# Patient Record
Sex: MALE | Race: WHITE | Employment: OTHER | URBAN - METROPOLITAN AREA
[De-identification: names, ages, dates, MRNs, and addresses within clinical notes are randomized per-mention and may not be internally consistent; named-entity substitution may affect disease eponyms.]

---

## 2021-01-03 ENCOUNTER — APPOINTMENT (OUTPATIENT)
Dept: ULTRASOUND IMAGING | Facility: HOSPITAL | Age: 61
DRG: 175 | End: 2021-01-03
Attending: EMERGENCY MEDICINE
Payer: MEDICARE

## 2021-01-03 ENCOUNTER — HOSPITAL ENCOUNTER (INPATIENT)
Facility: HOSPITAL | Age: 61
LOS: 9 days | Discharge: HOME HEALTH | DRG: 175 | End: 2021-01-13
Attending: EMERGENCY MEDICINE | Admitting: INTERNAL MEDICINE
Payer: MEDICARE

## 2021-01-03 ENCOUNTER — APPOINTMENT (OUTPATIENT)
Dept: GENERAL RADIOLOGY | Facility: HOSPITAL | Age: 61
DRG: 175 | End: 2021-01-03
Attending: EMERGENCY MEDICINE
Payer: MEDICARE

## 2021-01-03 ENCOUNTER — APPOINTMENT (OUTPATIENT)
Dept: CT IMAGING | Facility: HOSPITAL | Age: 61
DRG: 175 | End: 2021-01-03
Attending: EMERGENCY MEDICINE
Payer: MEDICARE

## 2021-01-03 DIAGNOSIS — E11.65 TYPE 2 DIABETES MELLITUS WITH HYPERGLYCEMIA, WITHOUT LONG-TERM CURRENT USE OF INSULIN (HCC): ICD-10-CM

## 2021-01-03 DIAGNOSIS — I26.99 ACUTE PULMONARY EMBOLISM, UNSPECIFIED PULMONARY EMBOLISM TYPE, UNSPECIFIED WHETHER ACUTE COR PULMONALE PRESENT (HCC): Primary | ICD-10-CM

## 2021-01-03 DIAGNOSIS — R00.0 TACHYCARDIA: ICD-10-CM

## 2021-01-03 DIAGNOSIS — F10.21 HISTORY OF ALCOHOLISM (HCC): ICD-10-CM

## 2021-01-03 DIAGNOSIS — N28.9 RENAL INSUFFICIENCY: ICD-10-CM

## 2021-01-03 DIAGNOSIS — I50.813 ACUTE ON CHRONIC RIGHT-SIDED CONGESTIVE HEART FAILURE (HCC): ICD-10-CM

## 2021-01-03 DIAGNOSIS — K59.00 CONSTIPATION, UNSPECIFIED CONSTIPATION TYPE: ICD-10-CM

## 2021-01-03 DIAGNOSIS — R77.8 ELEVATED TROPONIN: ICD-10-CM

## 2021-01-03 DIAGNOSIS — R79.1 ELEVATED INR: ICD-10-CM

## 2021-01-03 LAB
ALBUMIN SERPL-MCNC: 2.7 G/DL (ref 3.4–5)
ALBUMIN/GLOB SERPL: 0.7 {RATIO} (ref 1–2)
ALP LIVER SERPL-CCNC: 93 U/L
ALT SERPL-CCNC: 58 U/L
ANION GAP SERPL CALC-SCNC: 5 MMOL/L (ref 0–18)
APTT PPP: 34.1 SECONDS (ref 25.4–36.1)
AST SERPL-CCNC: 54 U/L (ref 15–37)
BASOPHILS # BLD AUTO: 0.09 X10(3) UL (ref 0–0.2)
BASOPHILS NFR BLD AUTO: 1 %
BILIRUB SERPL-MCNC: 2 MG/DL (ref 0.1–2)
BUN BLD-MCNC: 23 MG/DL (ref 7–18)
BUN/CREAT SERPL: 15.9 (ref 10–20)
CALCIUM BLD-MCNC: 9.2 MG/DL (ref 8.5–10.1)
CHLORIDE SERPL-SCNC: 110 MMOL/L (ref 98–112)
CO2 SERPL-SCNC: 25 MMOL/L (ref 21–32)
CREAT BLD-MCNC: 1.45 MG/DL
D-DIMER: 12.44 UG/ML FEU (ref ?–0.6)
DEPRECATED RDW RBC AUTO: 52.6 FL (ref 35.1–46.3)
EOSINOPHIL # BLD AUTO: 0.11 X10(3) UL (ref 0–0.7)
EOSINOPHIL NFR BLD AUTO: 1.2 %
ERYTHROCYTE [DISTWIDTH] IN BLOOD BY AUTOMATED COUNT: 16.2 % (ref 11–15)
EST. AVERAGE GLUCOSE BLD GHB EST-MCNC: 197 MG/DL (ref 68–126)
ETHANOL SERPL-MCNC: <3 MG/DL (ref ?–3)
GLOBULIN PLAS-MCNC: 3.9 G/DL (ref 2.8–4.4)
GLUCOSE BLD-MCNC: 186 MG/DL (ref 70–99)
HBA1C MFR BLD HPLC: 8.5 % (ref ?–5.7)
HCT VFR BLD AUTO: 39.2 %
HGB BLD-MCNC: 12.3 G/DL
IMM GRANULOCYTES # BLD AUTO: 0.05 X10(3) UL (ref 0–1)
IMM GRANULOCYTES NFR BLD: 0.6 %
INR BLD: 1.4 (ref 0.89–1.11)
LYMPHOCYTES # BLD AUTO: 1.43 X10(3) UL (ref 1–4)
LYMPHOCYTES NFR BLD AUTO: 16.1 %
M PROTEIN MFR SERPL ELPH: 6.6 G/DL (ref 6.4–8.2)
MCH RBC QN AUTO: 28.6 PG (ref 26–34)
MCHC RBC AUTO-ENTMCNC: 31.4 G/DL (ref 31–37)
MCV RBC AUTO: 91.2 FL
MONOCYTES # BLD AUTO: 1.17 X10(3) UL (ref 0.1–1)
MONOCYTES NFR BLD AUTO: 13.2 %
NEUTROPHILS # BLD AUTO: 6.03 X10 (3) UL (ref 1.5–7.7)
NEUTROPHILS # BLD AUTO: 6.03 X10(3) UL (ref 1.5–7.7)
NEUTROPHILS NFR BLD AUTO: 67.9 %
NT-PROBNP SERPL-MCNC: ABNORMAL PG/ML (ref ?–125)
OSMOLALITY SERPL CALC.SUM OF ELEC: 299 MOSM/KG (ref 275–295)
PLATELET # BLD AUTO: 259 10(3)UL (ref 150–450)
POTASSIUM SERPL-SCNC: 4.7 MMOL/L (ref 3.5–5.1)
PSA SERPL DL<=0.01 NG/ML-MCNC: 17.6 SECONDS (ref 12.4–14.6)
RBC # BLD AUTO: 4.3 X10(6)UL
SARS-COV-2 RNA RESP QL NAA+PROBE: NOT DETECTED
SODIUM SERPL-SCNC: 140 MMOL/L (ref 136–145)
TROPONIN I SERPL-MCNC: 0.18 NG/ML (ref ?–0.04)
WBC # BLD AUTO: 8.9 X10(3) UL (ref 4–11)

## 2021-01-03 PROCEDURE — 93970 EXTREMITY STUDY: CPT | Performed by: EMERGENCY MEDICINE

## 2021-01-03 PROCEDURE — 74019 RADEX ABDOMEN 2 VIEWS: CPT | Performed by: EMERGENCY MEDICINE

## 2021-01-03 PROCEDURE — 71275 CT ANGIOGRAPHY CHEST: CPT | Performed by: EMERGENCY MEDICINE

## 2021-01-03 PROCEDURE — 71045 X-RAY EXAM CHEST 1 VIEW: CPT | Performed by: EMERGENCY MEDICINE

## 2021-01-03 RX ORDER — ACETAMINOPHEN 325 MG/1
650 TABLET ORAL EVERY 6 HOURS PRN
Status: DISCONTINUED | OUTPATIENT
Start: 2021-01-03 | End: 2021-01-13

## 2021-01-03 RX ORDER — HEPARIN SODIUM AND DEXTROSE 10000; 5 [USP'U]/100ML; G/100ML
18 INJECTION INTRAVENOUS ONCE
Status: COMPLETED | OUTPATIENT
Start: 2021-01-03 | End: 2021-01-04

## 2021-01-03 RX ORDER — FUROSEMIDE 10 MG/ML
60 INJECTION INTRAMUSCULAR; INTRAVENOUS ONCE
Status: COMPLETED | OUTPATIENT
Start: 2021-01-03 | End: 2021-01-03

## 2021-01-03 RX ORDER — ONDANSETRON 2 MG/ML
4 INJECTION INTRAMUSCULAR; INTRAVENOUS EVERY 6 HOURS PRN
Status: DISCONTINUED | OUTPATIENT
Start: 2021-01-03 | End: 2021-01-13

## 2021-01-03 RX ORDER — MELATONIN
3 NIGHTLY PRN
Status: DISCONTINUED | OUTPATIENT
Start: 2021-01-03 | End: 2021-01-13

## 2021-01-03 RX ORDER — FAMOTIDINE 10 MG/ML
20 INJECTION, SOLUTION INTRAVENOUS ONCE
Status: COMPLETED | OUTPATIENT
Start: 2021-01-03 | End: 2021-01-03

## 2021-01-03 RX ORDER — ASPIRIN 81 MG/1
162 TABLET, CHEWABLE ORAL DAILY
Status: DISCONTINUED | OUTPATIENT
Start: 2021-01-03 | End: 2021-01-04

## 2021-01-03 RX ORDER — HEPARIN SODIUM 5000 [USP'U]/ML
80 INJECTION INTRAVENOUS; SUBCUTANEOUS ONCE
Status: COMPLETED | OUTPATIENT
Start: 2021-01-03 | End: 2021-01-03

## 2021-01-04 ENCOUNTER — APPOINTMENT (OUTPATIENT)
Dept: CV DIAGNOSTICS | Facility: HOSPITAL | Age: 61
DRG: 175 | End: 2021-01-04
Attending: HOSPITALIST
Payer: MEDICARE

## 2021-01-04 PROBLEM — I26.99 ACUTE PULMONARY EMBOLISM, UNSPECIFIED PULMONARY EMBOLISM TYPE, UNSPECIFIED WHETHER ACUTE COR PULMONALE PRESENT (HCC): Status: ACTIVE | Noted: 2021-01-04

## 2021-01-04 PROBLEM — R77.8 ELEVATED TROPONIN: Status: ACTIVE | Noted: 2021-01-04

## 2021-01-04 PROBLEM — R79.1 ELEVATED INR: Status: ACTIVE | Noted: 2021-01-04

## 2021-01-04 PROBLEM — K59.00 CONSTIPATION, UNSPECIFIED CONSTIPATION TYPE: Status: ACTIVE | Noted: 2021-01-04

## 2021-01-04 PROBLEM — F10.21 HISTORY OF ALCOHOLISM (HCC): Status: ACTIVE | Noted: 2021-01-04

## 2021-01-04 PROBLEM — N28.9 RENAL INSUFFICIENCY: Status: ACTIVE | Noted: 2021-01-04

## 2021-01-04 PROBLEM — E11.65 TYPE 2 DIABETES MELLITUS WITH HYPERGLYCEMIA, WITHOUT LONG-TERM CURRENT USE OF INSULIN (HCC): Status: ACTIVE | Noted: 2021-01-04

## 2021-01-04 PROBLEM — R00.0 TACHYCARDIA: Status: ACTIVE | Noted: 2021-01-04

## 2021-01-04 LAB
ANION GAP SERPL CALC-SCNC: 7 MMOL/L (ref 0–18)
APTT PPP: 108.5 SECONDS (ref 25.4–36.1)
APTT PPP: 167 SECONDS (ref 25.4–36.1)
APTT PPP: >300 SECONDS (ref 25.4–36.1)
ATRIAL RATE: 116 BPM
ATRIAL RATE: 127 BPM
BASOPHILS # BLD AUTO: 0.09 X10(3) UL (ref 0–0.2)
BASOPHILS NFR BLD AUTO: 1.3 %
BILIRUB UR QL STRIP.AUTO: NEGATIVE
BUN BLD-MCNC: 22 MG/DL (ref 7–18)
BUN/CREAT SERPL: 16.5 (ref 10–20)
CALCIUM BLD-MCNC: 8.9 MG/DL (ref 8.5–10.1)
CHLORIDE SERPL-SCNC: 111 MMOL/L (ref 98–112)
CHOLEST SMN-MCNC: 100 MG/DL (ref ?–200)
CLARITY UR REFRACT.AUTO: CLEAR
CO2 SERPL-SCNC: 23 MMOL/L (ref 21–32)
COLOR UR AUTO: YELLOW
CREAT BLD-MCNC: 1.33 MG/DL
DEPRECATED RDW RBC AUTO: 53.1 FL (ref 35.1–46.3)
EOSINOPHIL # BLD AUTO: 0.06 X10(3) UL (ref 0–0.7)
EOSINOPHIL NFR BLD AUTO: 0.8 %
ERYTHROCYTE [DISTWIDTH] IN BLOOD BY AUTOMATED COUNT: 16.3 % (ref 11–15)
GLUCOSE BLD-MCNC: 131 MG/DL (ref 70–99)
GLUCOSE BLD-MCNC: 140 MG/DL (ref 70–99)
GLUCOSE BLD-MCNC: 141 MG/DL (ref 70–99)
GLUCOSE BLD-MCNC: 153 MG/DL (ref 70–99)
GLUCOSE BLD-MCNC: 165 MG/DL (ref 70–99)
GLUCOSE BLD-MCNC: 177 MG/DL (ref 70–99)
GLUCOSE BLD-MCNC: 195 MG/DL (ref 70–99)
GLUCOSE UR STRIP.AUTO-MCNC: NEGATIVE MG/DL
HAV IGM SER QL: 1.7 MG/DL (ref 1.6–2.6)
HCT VFR BLD AUTO: 38.9 %
HDLC SERPL-MCNC: 39 MG/DL (ref 40–59)
HGB BLD-MCNC: 12.1 G/DL
IMM GRANULOCYTES # BLD AUTO: 0.03 X10(3) UL (ref 0–1)
IMM GRANULOCYTES NFR BLD: 0.4 %
KETONES UR STRIP.AUTO-MCNC: NEGATIVE MG/DL
LDLC SERPL CALC-MCNC: 47 MG/DL (ref ?–100)
LEUKOCYTE ESTERASE UR QL STRIP.AUTO: NEGATIVE
LYMPHOCYTES # BLD AUTO: 1.45 X10(3) UL (ref 1–4)
LYMPHOCYTES NFR BLD AUTO: 20.3 %
MCH RBC QN AUTO: 28.3 PG (ref 26–34)
MCHC RBC AUTO-ENTMCNC: 31.1 G/DL (ref 31–37)
MCV RBC AUTO: 90.9 FL
MONOCYTES # BLD AUTO: 0.78 X10(3) UL (ref 0.1–1)
MONOCYTES NFR BLD AUTO: 10.9 %
NEUTROPHILS # BLD AUTO: 4.74 X10 (3) UL (ref 1.5–7.7)
NEUTROPHILS # BLD AUTO: 4.74 X10(3) UL (ref 1.5–7.7)
NEUTROPHILS NFR BLD AUTO: 66.3 %
NITRITE UR QL STRIP.AUTO: NEGATIVE
NONHDLC SERPL-MCNC: 61 MG/DL (ref ?–130)
OSMOLALITY SERPL CALC.SUM OF ELEC: 298 MOSM/KG (ref 275–295)
P AXIS: 44 DEGREES
P AXIS: 60 DEGREES
P-R INTERVAL: 126 MS
P-R INTERVAL: 144 MS
PH UR STRIP.AUTO: 5 [PH] (ref 4.5–8)
PLATELET # BLD AUTO: 241 10(3)UL (ref 150–450)
POTASSIUM SERPL-SCNC: 4.3 MMOL/L (ref 3.5–5.1)
PROT UR STRIP.AUTO-MCNC: NEGATIVE MG/DL
Q-T INTERVAL: 312 MS
Q-T INTERVAL: 352 MS
QRS DURATION: 98 MS
QRS DURATION: 98 MS
QTC CALCULATION (BEZET): 453 MS
QTC CALCULATION (BEZET): 489 MS
R AXIS: 102 DEGREES
R AXIS: 113 DEGREES
RBC # BLD AUTO: 4.28 X10(6)UL
RBC UR QL AUTO: NEGATIVE
SODIUM SERPL-SCNC: 141 MMOL/L (ref 136–145)
SP GR UR STRIP.AUTO: 1.01 (ref 1–1.03)
T AXIS: 26 DEGREES
T AXIS: 6 DEGREES
TRIGL SERPL-MCNC: 68 MG/DL (ref 30–149)
UROBILINOGEN UR STRIP.AUTO-MCNC: <2 MG/DL
VENTRICULAR RATE: 116 BPM
VENTRICULAR RATE: 127 BPM
VLDLC SERPL CALC-MCNC: 14 MG/DL (ref 0–30)
WBC # BLD AUTO: 7.2 X10(3) UL (ref 4–11)

## 2021-01-04 PROCEDURE — 93306 TTE W/DOPPLER COMPLETE: CPT | Performed by: HOSPITALIST

## 2021-01-04 RX ORDER — DEXTROSE MONOHYDRATE 25 G/50ML
50 INJECTION, SOLUTION INTRAVENOUS
Status: DISCONTINUED | OUTPATIENT
Start: 2021-01-04 | End: 2021-01-13

## 2021-01-04 RX ORDER — LOSARTAN POTASSIUM 25 MG/1
12.5 TABLET ORAL DAILY
Status: DISCONTINUED | OUTPATIENT
Start: 2021-01-04 | End: 2021-01-05

## 2021-01-04 RX ORDER — HEPARIN SODIUM AND DEXTROSE 10000; 5 [USP'U]/100ML; G/100ML
INJECTION INTRAVENOUS CONTINUOUS
Status: DISCONTINUED | OUTPATIENT
Start: 2021-01-04 | End: 2021-01-11

## 2021-01-04 RX ORDER — FUROSEMIDE 10 MG/ML
40 INJECTION INTRAMUSCULAR; INTRAVENOUS ONCE
Status: COMPLETED | OUTPATIENT
Start: 2021-01-04 | End: 2021-01-04

## 2021-01-04 RX ORDER — FUROSEMIDE 10 MG/ML
40 INJECTION INTRAMUSCULAR; INTRAVENOUS
Status: DISCONTINUED | OUTPATIENT
Start: 2021-01-04 | End: 2021-01-06

## 2021-01-04 RX ORDER — METOPROLOL SUCCINATE 50 MG/1
50 TABLET, EXTENDED RELEASE ORAL
Status: DISCONTINUED | OUTPATIENT
Start: 2021-01-04 | End: 2021-01-04

## 2021-01-04 RX ORDER — ASPIRIN 81 MG/1
81 TABLET, CHEWABLE ORAL DAILY
Status: DISCONTINUED | OUTPATIENT
Start: 2021-01-05 | End: 2021-01-05

## 2021-01-04 RX ORDER — MAGNESIUM OXIDE 400 MG (241.3 MG MAGNESIUM) TABLET
400 TABLET ONCE
Status: COMPLETED | OUTPATIENT
Start: 2021-01-04 | End: 2021-01-04

## 2021-01-04 NOTE — CONSULTS
[facility]  Report of Consultation    Steve Dallins Patient Status:  Inpatient    1960 MRN [id number]   [facility] 8NE-A Attending Gaudencio Griggs MD   Hosp Day # 0 PCP Abbey Norris MD     REASON FOR CONSULTATION:     Recurr Hyperlipidemia     history of   • Obesity    • Pneumonia      History reviewed. No pertinent surgical history.   Family History   Problem Relation Age of Onset   • Other (Other) Mother         accident   • Hypertension Father    • Heart Disorder Father distress. Vital Signs: BP (!) 134/94 (BP Location: Left arm)   Pulse 116   Temp 98 °F (36.7 °C) (Oral)   Resp 20   Wt 134 kg (295 lb 6.7 oz)   SpO2 93%   BMI 44.92 kg/m²    HEENT: No Icterus. Neck:  No palpable lymphadenopathy. Neck is supple.    Chest Range    Troponin 0.180 (HH) <0.045 ng/mL   D-DIMER    Collection Time: 01/03/21  7:26 PM   Result Value Ref Range    D-Dimer 12.44 (H) <0.60 ug/mL FEU   PROTHROMBIN TIME (PT)    Collection Time: 01/03/21  7:26 PM   Result Value Ref Range    PT 17.6 (H) 12 (Bezet) 453 ms    P Axis 44 degrees    R Axis 113 degrees    T Axis 6 degrees   RAPID SARS-COV-2 BY PCR    Collection Time: 01/03/21  8:14 PM    Specimen: Nares;  Other   Result Value Ref Range    Rapid SARS-CoV-2 by PCR Not Detected Not Detected   POCT GLU 10(3)uL    MCV 90.9 80.0 - 100.0 fL    MCH 28.3 26.0 - 34.0 pg    MCHC 31.1 31.0 - 37.0 g/dL    RDW 16.3 (H) 11.0 - 15.0 %    RDW-SD 53.1 (H) 35.1 - 46.3 fL    Neutrophil Absolute Prelim 4.74 1.50 - 7.70 x10 (3) uL    Neutrophil Absolute 4.74 1.50 - 7.70 x Angiography, Chest (cpt=71275)    Result Date: 1/3/2021  CONCLUSION:   1. Small to moderate burden of bilateral pulmonary emboli. 2. Small right pleural effusion with trace left pleural effusion.   Critical findings discussed with Dr. Jacqueline Lopes at 2213 hours o is a 61year old male with recurrent venous thromboembolism     This is his second episode and this time both deep vein thrombosis and pulmonary embolism. Not sure the risk factor but the clotting is quite extensive darrell in the left leg.  He is on anticoagul

## 2021-01-04 NOTE — CONSULTS
Pulmonary / Critical Care H&P/Consult       NAME: Tayler Melara 125: [id number] - MRN: [id number] - Age: 61year old - :  1960    Date of Admission: 1/3/2021  7:03 PM  Admission Diagnosis: Tachycardia [R00.0]  Elevated INR [R79.1]  Renal in Smokeless tobacco: Never Used    Substance and Sexual Activity      Alcohol use: Yes        Comment: social - Hx of heavy use      Drug use: No      Sexual activity: Not on file    Lifestyle      Physical activity        Days per week: Not on file        M Temp:  98.4 °F (36.9 °C)  97.9 °F (36.6 °C)   TempSrc:  Oral  Oral   SpO2: 97% 98% 98% 99%   Weight:         O2: ra              Wt Readings from Last 3 Encounters:  01/04/21 : 295 lb 6.7 oz (134 kg)  12/09/20 : 279 lb (126.6 kg)  08/12/16 : 225 lb (102. bilat PEs in distal R and L main PAs extending to lower lobes, non occlusive. Small R > L effusions. Lungs clear    ASSESSMENT/PLAN:    1. Dyspnea: secondary to bilat PEs  - on room air  2.  PE / DVT: extensive LLE dvt with bilat PEs; tachycardic but stable

## 2021-01-04 NOTE — H&P
[facility]    History and Physical     [facility] Patient Status:  Inpatient    1960 MRN [id number]   [facility] 8NE-A Attending Janna Colbert MD   Hosp Day # 0 PCP Celestina Ng MD     Chief Complaint: sob and abdomin day for 40 years),  3 times but living with 3rd ex-wife, 6 kids, no cane or walker    Family History:   Family History   Problem Relation Age of Onset   • Other (Other) Mother         accident   • Hypertension Father    • Heart Disorder Father BUN 23* 22*   CREATSERUM 1.45* 1.33*   GFRAA 60 67   GFRNAA 52* 58*   CA 9.2 8.9   ALB 2.7*  --     141   K 4.7 4.3    111   CO2 25.0 23.0   ALKPHO 93  --    AST 54*  --    ALT 58  --    BILT 2.0  --    TP 6.6  --        CrCl cannot be calcul

## 2021-01-04 NOTE — PROGRESS NOTES
01/04/21 0115 01/04/21 0118 01/04/21 0121   Vital Signs   /84 (!) 140/100 (!) 145/97   MAP (mmHg)  --   --  112   BP Location Left arm Left arm Left arm   BP Method Automatic Automatic Automatic   Patient Position Lying Sitting Standing   Ortho's

## 2021-01-04 NOTE — PLAN OF CARE
Received pt at 0730. A&o x4. NSR/ST on tele. Rate up to 140's when up to bathroom. +2 BLE edema. Bilateral pulse 1+. Denies chest pain and SOB. O2 sats WNL on room air. Continent of bowel and bladder. Last BM 1/4. Up w/ SBA at baseline. UA sent.  PVR comple unsuccessful or patient reports new pain  - Anticipate increased pain with activity and pre-medicate as appropriate  Outcome: Progressing     Problem: SAFETY ADULT - FALL  Goal: Free from fall injury  Description: INTERVENTIONS:  - Assess pt frequently for respiratory status  - Assess for changes in mentation and behavior  - Position to facilitate oxygenation and minimize respiratory effort  - Oxygen supplementation based on oxygen saturation or ABGs  - Provide Smoking Cessation handout, if applicable  - Enc emptying  Outcome: Progressing     Problem: METABOLIC/FLUID AND ELECTROLYTES - ADULT  Goal: Glucose maintained within prescribed range  Description: INTERVENTIONS:  - Monitor Blood Glucose as ordered  - Assess for signs and symptoms of hyperglycemia and hy site(s) healing without S/S of infection  Description: INTERVENTIONS:  - Assess and document risk factors for pressure ulcer development  - Assess and document skin integrity  - Assess and document dressing/incision, wound bed, drain sites and surrounding using safe patient handling equipment as needed  - Ensure adequate protection for wounds/incisions during mobilization  - Obtain PT/OT consults as needed  - Advance activity as appropriate  - Communicate ordered activity level and limitations with patient/

## 2021-01-04 NOTE — CONSULTS
Vascular Surgery  New Patient Consultation    Name: Stacy Leger  MRN: [id number]  : 1960    Referring Provider: No ref.  provider found    CC: Left lower extremity DVT with PE.    HPI: 27-year-old male with hx of hypertension, hyperlipidemia Comment: social - Hx of heavy use      Drug use: No       Family History   Problem Relation Age of Onset   • Other (Other) Mother         accident   • Hypertension Father    • Heart Disorder Father         CABG   • Other (Other) Father         Heart NEPERCENT 66.3 01/04/2021    LYPERCENT 20.3 01/04/2021    MOPERCENT 10.9 01/04/2021    EOPERCENT 0.8 01/04/2021    BAPERCENT 1.3 01/04/2021    NE 4.74 01/04/2021    LYMABS 1.45 01/04/2021    MOABSO 0.78 01/04/2021    EOABSO 0.06 01/04/2021    BAABSO 0.09

## 2021-01-04 NOTE — PLAN OF CARE
Report received [facility] RN. from Assumed care of Pt. At 0100. Pt. Is Axox4 and on room air with an O2 sat of 96%. Pt. Has diminished bilateral breath sounds. Pt. Denies any shortness of breath. Pt. Has dyspnea on exertion.  Pt. Has sinus tach w/ a HR of 11 See additional Care Plan goals for specific interventions  Outcome: Progressing     Problem: PAIN - ADULT  Goal: Verbalizes/displays adequate comfort level or patient's stated pain goal  Description: INTERVENTIONS:  - Encourage pt to monitor pain and reque of decreased coronary artery perfusion - ex.  Angina  - Evaluate fluid balance, assess for edema, trend weights  Outcome: Progressing  Goal: Absence of cardiac arrhythmias or at baseline  Description: INTERVENTIONS:  - Continuous cardiac monitoring, monitor ordered and tolerated  - Evaluate effectiveness of GI medications  - Encourage mobilization and activity  - Obtain nutritional consult as needed  - Establish a toileting routine/schedule  - Consider collaborating with pharmacy to review patient's medicatio ordered  - Monitor response to interventions for patient's volume status, including labs, urine output, blood pressure (other measures as available)  - Encourage oral intake as appropriate  - Instruct patient on fluid and nutrition restrictions as appropri mobilization of patient  - Hold pressure on venipuncture sites to achieve adequate hemostasis  - Assess for signs and symptoms of internal bleeding  - Monitor lab trends  - Patient is to report abnormal signs of bleeding to staff  - Avoid use of toothpicks bathing  - Educate and encourage patient/family in tolerated functional activity level and precautions during self-care  Outcome: Progressing

## 2021-01-04 NOTE — CONSULTS
[facility]  Report of Consultation    Faith Weiss Patient Status:  Inpatient    1960 MRN [id number]   [facility] 8NE-A Attending Jeb Oswald MD   Hosp Day # 0 PCP Juanita Sigala MD     Reason for Consultation:  Bilateral has never used smokeless tobacco. He reports current alcohol use. He reports that he does not use drugs.     Allergies:  No Known Allergies    Medications:    Current Facility-Administered Medications:   •  heparin (PORCINE) drip [id number]/250mL infusion C pulses are 2+. Neurologic: Alert and oriented, normal affect. Skin: Warm and dry. Laboratories and Data:  Diagnostics    EKG: sinus tachycardia, right axis deviation, non-specific twave abnormality. CTA Chest:    1.  Small to moderate burden of bila 8. 5    HTN  - reasonable, considering he hasn't been on any medications. - meds per CHF plan of care.      Plan:  - transition to oral AC when ok with pulm/heme.   - losartan low dose  - add metoprolol later  - continue furosemide 40mg IV BID.   - add ASA

## 2021-01-04 NOTE — ED INITIAL ASSESSMENT (HPI)
Patient arrives per EMS for evaluation of constipation states symptoms started approximately 1 week ago.  Now c/o bloating and difficultly voiding

## 2021-01-04 NOTE — ED PROVIDER NOTES
Patient Seen in: [facility] Emergency Department      History   Patient presents with:  Difficulty Breathing  Constipation    Stated Complaint: constipation     HPI/Subjective:   HPI    Patient with a history of diabetes and obesity.   Patient is als [01/03/21 1908]   BP (!) 132/97   Pulse (!) 131   Resp 26   Temp 98.8 °F (37.1 °C)   Temp src Temporal   SpO2 100 %   O2 Device None (Room air)       Current:/86   Pulse (!) 126   Temp 98.8 °F (37.1 °C) (Temporal)   Resp 20   Wt 124.7 kg   SpO2 100% NATRIURETIC PEPTIDE - Abnormal; Notable for the following components:    Pro-Beta Natriuretic Peptide 10,884 (*)     All other components within normal limits   HEMOGLOBIN A1C - Abnormal; Notable for the following components:    HgbA1C 8.5 (*)     Estimate consistent with a history of untreated diabetes. Electrolytes normal.  Creatinine 1.45.   AST elevated with normal ALT consistent with patient's history of alcohol abuse  Troponin elevated 0.18   D-dimer markedly elevated 12.44  BNP 24491  Rapid Covid nega intervention, complex decision making, and/or extensive discussions with the patient, family, and clinical staff.                   Present on Admission  Date Reviewed: 12/13/2020          ICD-10-CM Noted POA    Acute on chronic right-sided congestive heart

## 2021-01-04 NOTE — DIETARY NOTE
[facility]     Admitting diagnosis:  Tachycardia [R00.0]  Elevated INR [R79.1]  Renal insufficiency [N28.9]  Elevated troponin [R77.8]  History of alcoholism ([facility] [address] [F10.21]  Constipation, unspecified constipa nutrition risk at this time. Please consult if patient status changes or nutrition issues arise.     Morris Frank RDN  Clinical Dietitian  Pager- [phone]- [phone]

## 2021-01-04 NOTE — BH PROGRESS NOTE
[facility] Resource Referral Counselor Note    [facility] Patient Status:  Inpatient    1960 MRN [id number]   [facility] 8NE-A Attending Connor Church MD   Hosp Day # 0 PCP Brody Augustine MD       S(subjective) The p

## 2021-01-04 NOTE — PROGRESS NOTES
01/04/21 1600   Clinical Encounter Type   Visited With Patient   Routine Visit Introduction   Continue Visiting No   Patient Spiritual Encounters   Spiritual Assessment Completed No   Spiritual Coping Strategies Patient requested prayer and this was pro

## 2021-01-05 ENCOUNTER — APPOINTMENT (OUTPATIENT)
Dept: ULTRASOUND IMAGING | Facility: HOSPITAL | Age: 61
DRG: 175 | End: 2021-01-05
Attending: HOSPITALIST
Payer: MEDICARE

## 2021-01-05 LAB
ALBUMIN SERPL-MCNC: 2.4 G/DL (ref 3.4–5)
ALBUMIN/GLOB SERPL: 0.7 {RATIO} (ref 1–2)
ALP LIVER SERPL-CCNC: 80 U/L
ALT SERPL-CCNC: 55 U/L
ANION GAP SERPL CALC-SCNC: 9 MMOL/L (ref 0–18)
APTT PPP: 96.6 SECONDS (ref 25.4–36.1)
AST SERPL-CCNC: 47 U/L (ref 15–37)
BASOPHILS # BLD AUTO: 0.1 X10(3) UL (ref 0–0.2)
BASOPHILS NFR BLD AUTO: 1.4 %
BILIRUB SERPL-MCNC: 1.8 MG/DL (ref 0.1–2)
BUN BLD-MCNC: 26 MG/DL (ref 7–18)
BUN/CREAT SERPL: 16 (ref 10–20)
CALCIUM BLD-MCNC: 8.8 MG/DL (ref 8.5–10.1)
CHLORIDE SERPL-SCNC: 108 MMOL/L (ref 98–112)
CO2 SERPL-SCNC: 24 MMOL/L (ref 21–32)
CREAT BLD-MCNC: 1.62 MG/DL
CREAT UR-SCNC: 75.2 MG/DL
DEPRECATED RDW RBC AUTO: 52.8 FL (ref 35.1–46.3)
EOSINOPHIL # BLD AUTO: 0.11 X10(3) UL (ref 0–0.7)
EOSINOPHIL NFR BLD AUTO: 1.6 %
ERYTHROCYTE [DISTWIDTH] IN BLOOD BY AUTOMATED COUNT: 16.5 % (ref 11–15)
GLOBULIN PLAS-MCNC: 3.4 G/DL (ref 2.8–4.4)
GLUCOSE BLD-MCNC: 103 MG/DL (ref 70–99)
GLUCOSE BLD-MCNC: 108 MG/DL (ref 70–99)
GLUCOSE BLD-MCNC: 120 MG/DL (ref 70–99)
GLUCOSE BLD-MCNC: 154 MG/DL (ref 70–99)
GLUCOSE BLD-MCNC: 93 MG/DL (ref 70–99)
HAV IGM SER QL: 1.9 MG/DL (ref 1.6–2.6)
HCT VFR BLD AUTO: 38.4 %
HGB BLD-MCNC: 11.7 G/DL
IMM GRANULOCYTES # BLD AUTO: 0.03 X10(3) UL (ref 0–1)
IMM GRANULOCYTES NFR BLD: 0.4 %
LYMPHOCYTES # BLD AUTO: 1.51 X10(3) UL (ref 1–4)
LYMPHOCYTES NFR BLD AUTO: 21.7 %
M PROTEIN MFR SERPL ELPH: 5.8 G/DL (ref 6.4–8.2)
MCH RBC QN AUTO: 27.4 PG (ref 26–34)
MCHC RBC AUTO-ENTMCNC: 30.5 G/DL (ref 31–37)
MCV RBC AUTO: 89.9 FL
MONOCYTES # BLD AUTO: 0.82 X10(3) UL (ref 0.1–1)
MONOCYTES NFR BLD AUTO: 11.8 %
NEUTROPHILS # BLD AUTO: 4.39 X10 (3) UL (ref 1.5–7.7)
NEUTROPHILS # BLD AUTO: 4.39 X10(3) UL (ref 1.5–7.7)
NEUTROPHILS NFR BLD AUTO: 63.1 %
OSMOLALITY SERPL CALC.SUM OF ELEC: 297 MOSM/KG (ref 275–295)
PLATELET # BLD AUTO: 247 10(3)UL (ref 150–450)
POTASSIUM SERPL-SCNC: 3.6 MMOL/L (ref 3.5–5.1)
RBC # BLD AUTO: 4.27 X10(6)UL
SODIUM SERPL-SCNC: 141 MMOL/L (ref 136–145)
SODIUM SERPL-SCNC: 66 MMOL/L
UUN UR-MCNC: 403 MG/DL
WBC # BLD AUTO: 7 X10(3) UL (ref 4–11)

## 2021-01-05 PROCEDURE — 76700 US EXAM ABDOM COMPLETE: CPT | Performed by: HOSPITALIST

## 2021-01-05 PROCEDURE — 90792 PSYCH DIAG EVAL W/MED SRVCS: CPT | Performed by: OTHER

## 2021-01-05 RX ORDER — ASPIRIN 81 MG/1
81 TABLET ORAL DAILY
Status: DISCONTINUED | OUTPATIENT
Start: 2021-01-06 | End: 2021-01-13

## 2021-01-05 RX ORDER — ESCITALOPRAM OXALATE 5 MG/1
5 TABLET ORAL ONCE
Status: DISCONTINUED | OUTPATIENT
Start: 2021-01-05 | End: 2021-01-05

## 2021-01-05 RX ORDER — ESCITALOPRAM OXALATE 10 MG/1
10 TABLET ORAL DAILY
Status: DISCONTINUED | OUTPATIENT
Start: 2021-01-06 | End: 2021-01-05

## 2021-01-05 NOTE — PROGRESS NOTES
[facility] Hospitalist Progress Note                                                                   [address] [facility]  7/18/1960    SUBJECTIVE: No chest pain, palpitations, shortness of breat 61year old male with history of systolic HF with EF 84-52 %, DM type 2, htn, hld, hx of PE was on coumadin presenting with progressively worsening sob and dry cough for 10 days and abdominal pain as well.     Bilateral PE  Extensive Left leg DVT  -pt on

## 2021-01-05 NOTE — CM/SW NOTE
Script for xarelto starter pack initially sent to patient's listed [facility]--transferred to [facility]--await cost with insurance    Cost of xarelto starter pack $4.oo

## 2021-01-05 NOTE — PROGRESS NOTES
[place] Patient Status:  Inpatient    1960 MRN [id number]   [facility] 8NE-A Attending Cathy Costa MD   Hosp Day # 1 PCP Marlin Suero MD     Pulm / Critical Care Progress Note     S: pt states that he 23.0 24.0   BUN 23* 22* 26*     Creatinine (mg/dL)   Date Value   01/05/2021 1.62 (H)   01/04/2021 1.33 (H)   01/03/2021 1.45 (H)   ]    Recent Labs   Lab 01/03/21 1926 01/05/21  0525   ALT 58 55   AST 54* 47*       Imaging: Echo: EF 15%; mild to mod aort

## 2021-01-05 NOTE — PLAN OF CARE
Assumed care of Pt. At [address]. Pt. Is Axox4 and on room air with an O2 sat of 97%. Pt. Has poor safety awareness. This Rn reinforced using the call light and remaining on bed rest.  Education provided.  Pt. Declining to remain on bedrest and yelling \"I don't Patient's Short Term Goal:    Interventions:   - See additional Care Plan goals for specific interventions  Outcome: Progressing     Problem: PAIN - ADULT  Goal: Verbalizes/displays adequate comfort level or patient's stated pain goal  Description: Bruce Finley pulses, skin color and temperature  - Assess for signs of decreased coronary artery perfusion - ex.  Angina  - Evaluate fluid balance, assess for edema, trend weights  Outcome: Progressing  Goal: Absence of cardiac arrhythmias or at baseline  Description: I function  - Maintain adequate hydration with IV or PO as ordered and tolerated  - Evaluate effectiveness of GI medications  - Encourage mobilization and activity  - Obtain nutritional consult as needed  - Establish a toileting routine/schedule  - Consider serum osmolarity and serum sodium as indicated or ordered  - Monitor response to interventions for patient's volume status, including labs, urine output, blood pressure (other measures as available)  - Encourage oral intake as appropriate  - Instruct patie rectal medication administration  - Ensure safe mobilization of patient  - Hold pressure on venipuncture sites to achieve adequate hemostasis  - Assess for signs and symptoms of internal bleeding  - Monitor lab trends  - Patient is to report abnormal signs performing ADLs such as feeding, grooming, and bathing  - Educate and encourage patient/family in tolerated functional activity level and precautions during self-care  Outcome: Progressing

## 2021-01-05 NOTE — CONSULTS
[facility]  Report of Psychiatric Consultation    Lilly Aly Patient Status:  Inpatient    1960 MRN [id number]   [facility] 8NE-A Attending Kymberly Jones MD   [address] Day # 1 PCP Herman Jung MD     Date of Admission: 1/3/ trouble cutting down and developed tolerance and withdrawal symptoms if he didn't drink daily. Three wks ago, he decided to quit. He went through withdrawal. He had sweats and tremors and muscle aches and N/V. The bloody emesis scared him.  He began goi [facility])    • Essential hypertension    • Fluid retention    • Heart failure (HCC)    • Hyperlipidemia     history of   • Obesity    • Pneumonia      History reviewed. No pertinent surgical history.   Family History   Problem Relation Age of Onset   • Other (O Temp:      Appearance: fair grooming  Behavior: normal psychomotor  Attitude: cooperative  Gait: not observed    Speech: normal rate, rhythm and volume    Mood: anxious, tired  Affect: Congruent    Thought process: logical  Thought content: no delusions

## 2021-01-05 NOTE — PAYOR COMM NOTE
--------------  ADMISSION REVIEW     Payor: Gela Chun #:  [id number]  Authorization Number: [id number]    Admit date: 1/4/21  Admit time: 0101     Admitting Physician: Isabella Larose MD  Attending Physician:  Maria Teresa Gonzalez MD  Primary Objective:   Past Medical History:   Diagnosis Date   • Diabetes ([facility] [address]    • Essential hypertension    • Fluid retention    • Heart failure (HCC)    • Hyperlipidemia     history of   • Pneumonia      Review of Systems  Positive for stated complaint: constip All other components within normal limits   TROPONIN I - Abnormal; Notable for the following components:    Troponin 0.180 (*)     All other components within normal limits   D-DIMER - Abnormal; Notable for the following components:    D-Dimer 12.44 (*) Metabolic panel shows elevated blood sugar consistent with a history of untreated diabetes. Electrolytes normal.  Creatinine 1.45.   AST elevated with normal ALT consistent with patient's history of alcohol abuse  Troponin elevated 0.18   D-dimer markedly Present on Admission  Date Reviewed: 12/13/2020          ICD-10-CM Noted POA    Acute on chronic right-sided congestive heart failure [facility]) I50.813 1/3/2021 Unknown     Abraahm Arenas MD on 1/3/2021 10:25 PM         H&P signed by Connor Church MD at 1/4/ A comprehensive 14 point review of systems was completed. Pertinent positives and negatives noted in the HPI.     Physical Exam:    BP (!) 130/95 (BP Location: Left arm)   Pulse 116   Temp 98.4 °F (36.9 °C) (Oral)   Resp 18   Wt 295 lb 6.7 oz (134 kg) -vascular consulted to eval for intervention of dvt  -? ??IVC filter, follow pulm recc    Acute on Chronic Systolic CHF  -Cardiology consulted  -strict I/o  -daily weights  -lasix 40 mg iv x 1, follow cardiology recc  -echo reordered  -may need ischemic billy 61year old male with numerous comorbidities who presents with left lower extremity DVT with PE. Vascular surgery consulted to determine whether he is a candidate for thrombectomy.   In evaluation of the patient given his age and numerous comorbidities and - would recommend lifelong anticoagulation given recurrent event without clear trigger. Will ask heme to eval and help decide outpt regimen given obesity and mild renal dysfunction (coumadin vs doac)  3. H/o chf:   - cards consulted, echo pending.    Will f -Cardiology following  -strict I/o  -daily weights  -lasix iv --> hold due to mumtaz  -echo reordered --> no acute changes     MUMTAZ  -worse today   -pt may have had urinary retention on admission   -check renal US--> pending  -consult renal  -check ua --> neg 1/5/2021 0951      Gross per 24 hour   Intake 966.9 ml   Output 1175 ml   Net -208.1 ml     Assessment/Plan:  -Pulmonary embolism–hemodynamically stable. Tolerating heparin. Heme has recommended factor X inhibitor.   I am concerned about the financial imp 1/5/2021  10:54 AM    MEDICATIONS ADMINISTERED SINCE ADMISSION     01/03/21 01/04/21 01/05/21   aspirin chewable tab 162 mg   Dose: 162 mg  Freq: Daily Route: OR  Start: 01/03/21 2224 End: 01/04/21 1211    2246-Given          0904-Given   1211-D/C'd   1 unit of Novolog/aspart insulin will decrease blood glucose by 40 mg/dL    Give 1 unit if blood glucose 141-180 mg/dL  Give 2 units if blood glucose 181-220 mg/dL  Give 3 units if blood glucose 221-260 mg/dL  Give 4 units if blood glucose 261-300 mg/dL  G Perflutren Lipid Microsphere (DEFINITY) 6.52 MG/ML injection 1.5 mL   Dose: 1.5 mL  Freq: IMG once as needed Route: IV  PRN Reason: contrast  Start: 01/04/21 1101 End: 01/04/21 1102     1102-Given                 REVIEWER COMMENTS:     FOR REVIEW/APPRO

## 2021-01-05 NOTE — CONSULTS
[facility]    Report of Consultation    [facility] Patient Status:  Inpatient    1960 MRN [id number]   [facility] 8NE-A Attending Jordana Navarro MD   [facility] Day # 1 PCP Chelsea Bertrand MD     Date of consult: 2021    REAS 12.5 mg, 12.5 mg, Oral, Daily Beta Blocker  •  Sertraline HCl (ZOLOFT) tab 50 mg, 50 mg, Oral, Daily  •  heparin (PORCINE) drip [id number]/250mL infusion CONTINUOUS, 200-3,000 Units/hr, Intravenous, Continuous  •  glucose (DEX4) oral liquid 15 g, 15 g, Ora Value Date     (H) 01/05/2021    BUN 26 (H) 01/05/2021    BUNCREA 16.0 01/05/2021    CREATSERUM 1.62 (H) 01/05/2021    ANIONGAP 9 01/05/2021    GFRNAA 45 (L) 01/05/2021    GFRAA 53 (L) 01/05/2021    CA 8.8 01/05/2021    OSMOCALC 297 (H) 01/05/2021 found.      ASSESSMENT/PLAN:   61year old male w ho CHF EF 15-20%, etoh abuse, DM2, HTN, HL, ho PE on coumadin who presented with worsening SOB and cough. Found to have bilateral PE and extensive LLE DVT and acute on chronic HF.  Nephrology consulted to [facility]

## 2021-01-06 ENCOUNTER — APPOINTMENT (OUTPATIENT)
Dept: CT IMAGING | Facility: HOSPITAL | Age: 61
DRG: 175 | End: 2021-01-06
Attending: HOSPITALIST
Payer: MEDICARE

## 2021-01-06 ENCOUNTER — APPOINTMENT (OUTPATIENT)
Dept: GENERAL RADIOLOGY | Facility: HOSPITAL | Age: 61
DRG: 175 | End: 2021-01-06
Attending: HOSPITALIST
Payer: MEDICARE

## 2021-01-06 LAB
ANION GAP SERPL CALC-SCNC: 10 MMOL/L (ref 0–18)
APTT PPP: 56.4 SECONDS (ref 25.4–36.1)
APTT PPP: 75.2 SECONDS (ref 25.4–36.1)
ATRIAL RATE: 151 BPM
BASOPHILS # BLD AUTO: 0.08 X10(3) UL (ref 0–0.2)
BASOPHILS NFR BLD AUTO: 0.5 %
BILIRUB UR QL STRIP.AUTO: NEGATIVE
BUN BLD-MCNC: 28 MG/DL (ref 7–18)
BUN/CREAT SERPL: 17.6 (ref 10–20)
CALCIUM BLD-MCNC: 8.6 MG/DL (ref 8.5–10.1)
CHLORIDE SERPL-SCNC: 107 MMOL/L (ref 98–112)
CLARITY UR REFRACT.AUTO: CLEAR
CO2 SERPL-SCNC: 24 MMOL/L (ref 21–32)
COLOR UR AUTO: YELLOW
CREAT BLD-MCNC: 1.59 MG/DL
DEPRECATED RDW RBC AUTO: 53.3 FL (ref 35.1–46.3)
EOSINOPHIL # BLD AUTO: 0.04 X10(3) UL (ref 0–0.7)
EOSINOPHIL NFR BLD AUTO: 0.3 %
ERYTHROCYTE [DISTWIDTH] IN BLOOD BY AUTOMATED COUNT: 16.6 % (ref 11–15)
GLUCOSE BLD-MCNC: 120 MG/DL (ref 70–99)
GLUCOSE BLD-MCNC: 133 MG/DL (ref 70–99)
GLUCOSE BLD-MCNC: 182 MG/DL (ref 70–99)
GLUCOSE BLD-MCNC: 188 MG/DL (ref 70–99)
GLUCOSE BLD-MCNC: 216 MG/DL (ref 70–99)
GLUCOSE UR STRIP.AUTO-MCNC: NEGATIVE MG/DL
HAV IGM SER QL: 1.8 MG/DL (ref 1.6–2.6)
HCT VFR BLD AUTO: 40.3 %
HGB BLD-MCNC: 12.5 G/DL
IMM GRANULOCYTES # BLD AUTO: 0.1 X10(3) UL (ref 0–1)
IMM GRANULOCYTES NFR BLD: 0.6 %
KETONES UR STRIP.AUTO-MCNC: NEGATIVE MG/DL
LEUKOCYTE ESTERASE UR QL STRIP.AUTO: NEGATIVE
LYMPHOCYTES # BLD AUTO: 0.72 X10(3) UL (ref 1–4)
LYMPHOCYTES NFR BLD AUTO: 4.6 %
MCH RBC QN AUTO: 27.9 PG (ref 26–34)
MCHC RBC AUTO-ENTMCNC: 31 G/DL (ref 31–37)
MCV RBC AUTO: 90 FL
MONOCYTES # BLD AUTO: 1.15 X10(3) UL (ref 0.1–1)
MONOCYTES NFR BLD AUTO: 7.4 %
NEUTROPHILS # BLD AUTO: 13.53 X10 (3) UL (ref 1.5–7.7)
NEUTROPHILS # BLD AUTO: 13.53 X10(3) UL (ref 1.5–7.7)
NEUTROPHILS NFR BLD AUTO: 86.6 %
NITRITE UR QL STRIP.AUTO: NEGATIVE
OSMOLALITY SERPL CALC.SUM OF ELEC: 299 MOSM/KG (ref 275–295)
P AXIS: 58 DEGREES
P-R INTERVAL: 128 MS
PH UR STRIP.AUTO: 5 [PH] (ref 4.5–8)
PLATELET # BLD AUTO: 277 10(3)UL (ref 150–450)
POTASSIUM SERPL-SCNC: 3.8 MMOL/L (ref 3.5–5.1)
PROT UR STRIP.AUTO-MCNC: NEGATIVE MG/DL
Q-T INTERVAL: 324 MS
QRS DURATION: 84 MS
QTC CALCULATION (BEZET): 513 MS
R AXIS: 117 DEGREES
RBC # BLD AUTO: 4.48 X10(6)UL
RBC UR QL AUTO: NEGATIVE
SODIUM SERPL-SCNC: 141 MMOL/L (ref 136–145)
SP GR UR STRIP.AUTO: 1.01 (ref 1–1.03)
T AXIS: 193 DEGREES
UROBILINOGEN UR STRIP.AUTO-MCNC: <2 MG/DL
VENTRICULAR RATE: 151 BPM
WBC # BLD AUTO: 15.6 X10(3) UL (ref 4–11)

## 2021-01-06 PROCEDURE — 74176 CT ABD & PELVIS W/O CONTRAST: CPT | Performed by: HOSPITALIST

## 2021-01-06 PROCEDURE — 71046 X-RAY EXAM CHEST 2 VIEWS: CPT | Performed by: HOSPITALIST

## 2021-01-06 RX ORDER — MAGNESIUM OXIDE 400 MG (241.3 MG MAGNESIUM) TABLET
400 TABLET ONCE
Status: DISCONTINUED | OUTPATIENT
Start: 2021-01-06 | End: 2021-01-06

## 2021-01-06 RX ORDER — POTASSIUM CHLORIDE 20 MEQ/1
40 TABLET, EXTENDED RELEASE ORAL ONCE
Status: DISCONTINUED | OUTPATIENT
Start: 2021-01-06 | End: 2021-01-06

## 2021-01-06 NOTE — PROGRESS NOTES
[facility] Cardiology Progress Note - [facility] Patient Status:  Inpatient    1960 MRN [id number]   [facility] 8NE-A Attending Maria Teresa Gonzalez MD   Hosp Day # 2 PCP Martha Ragsdale MD     Subjective:  Dorcas Nunes at 1/6/2021 1210  Gross per 24 hour   Intake 780 ml   Output 720 ml   Net 60 ml       Last 3 Weights  01/06/21 0510 : 283 lb 8.2 oz (128.6 kg)  01/05/21 0404 : 290 lb 8 oz (131.8 kg)  01/04/21 0115 : 295 lb 6.7 oz (134 kg)  01/03/21 1908 : 275 lb (124.7 kg Once    •  [START ON 1/7/2021] cefTRIAXone Sodium (ROCEPHIN) 2 g in sodium chloride 0.9% 100 mL MBP/ADD-vantage, 2 g, Intravenous, Q24H    •  aspirin EC tab 81 mg, 81 mg, Oral, Daily    •  metoprolol succinate (Toprol XL) partial tablet 12.5 mg, 12.5 mg, O

## 2021-01-06 NOTE — PLAN OF CARE
Alert and oriented x4 on tele monitor 100's sinus tach. Heparin gtt at 1400 units/hr in progress patent and intact. Denies any pain. Updated w/ poc and verbalized understanding. All needs attended and will continue to monitor. Call light within reach.   AT precautions as indicated by assessment.  - Educate pt/family on patient safety including physical limitations  - Instruct pt to call for assistance with activity based on assessment  - Modify environment to reduce risk of injury  - Provide assistive device perform as needed  - Assess and instruct to report SOB or any respiratory difficulty  - Respiratory Therapy support as indicated  - Manage/alleviate anxiety  - Monitor for signs/symptoms of CO2 retention  Outcome: Progressing     Problem: GASTROINTESTINAL initiate nutrition consult as needed  - Instruct patient on self management of diabetes  Outcome: Progressing  Goal: Electrolytes maintained within normal limits  Description: INTERVENTIONS:  - Monitor labs and rhythm and assess patient for signs and sympt indicated  Outcome: Progressing  Goal: Oral mucous membranes remain intact  Description: INTERVENTIONS  - Assess oral mucosa and hygiene practices  - Implement preventative oral hygiene regimen  - Implement oral medicated treatments as ordered  Outcome: Pr and body alignment per provider's orders  - Instruct and reinforce with patient and family use of appropriate assistive device and precautions (e.g. spinal or hip dislocation precautions)  Outcome: Progressing     Problem: Impaired Functional Mobility  [facility]

## 2021-01-06 NOTE — CONSULTS
[facility] Note: Antimicrobial Weight Based Dose Adjustment for: ceftriaxone (ROCEPHIN)    Peggy Carey is a 61year old patient who has been prescribed ceftriaxone (ROCEPHIN) 1 gm every 24 hours. CrCl cannot be calculated (Unknown ideal weight.

## 2021-01-06 NOTE — PROGRESS NOTES
[facility] Hospitalist Progress Note                                                                   [address] [facility]  7/18/1960    SUBJECTIVE: No chest pain, palpitations, shortness of breat metoprolol succinate  12.5 mg Oral Daily Beta Blocker   • Sertraline HCl  50 mg Oral Daily   • Insulin Aspart Pen  1-5 Units Subcutaneous TID CC and HS   • furosemide  40 mg Intravenous BID (Diuretic)     Continuous Infusions:   • Continuous dose Heparin i Day #1, monitor clinical response  -trend    Tachycardia  -etiology uncertain  -sinus tach  -monitor on tele  -follow cardiology recc     Quality:  · DVT Prophylaxis: heparin drip  · CODE status: FULL code  · POA is his son [employer]onna [employer]  · Newsome: none     Plan of c

## 2021-01-06 NOTE — PAYOR COMM NOTE
--------------  CONTINUED STAY REVIEW    Payor: Rosalia Bosworth #:  [id number]  Authorization Number: [id number]    Admit date: 1/4/21  Admit time: [address] [facility]    Admitting Physician: Tere Montgomery MD  Attending Physician:  MD Jacquelyn Li discharge     Acute on Chronic Systolic CHF  -Cardiology following  -strict I/o  -daily weights  -lasix iv --> hold due to mumtaz  -echo reordered --> no acute changes     MUMTAZ  -unchanged today   -pt may have had urinary retention on admission   -check renal weight but diuretics and ARB are held secondary to renal insufficiency. We will continue and increase beta-blocker for now. Will decrease dose and frequency of the diuretics and reinstitute ARB when renal service allows.   -Pulmonary embolism–on IV heparin Action Dose Route User    1/6/2021 0927 Given 50 mg Oral Joanne Hawkins RN        FOR CONTINUED REVIEW/APPROVAL OF INPT INTERMEDIATE CARE ADMISSION

## 2021-01-06 NOTE — PLAN OF CARE
Received pt at 0700. A&o x4. ST on tele. WNL on room air. Denies chest pain/SOB. Heparin drip per DVT protocol. BLE edema +2 and scrotal edema. Up stand by assist. Last BM 1/6. Continue PVR after each void. 0830 temp 100.1.  Blood culture, UA, CXR, CT abd activity and pre-medicate as appropriate  Outcome: Progressing     Problem: SAFETY ADULT - FALL  Goal: Free from fall injury  Description: INTERVENTIONS:  - Assess pt frequently for physical needs  - Identify cognitive and physical deficits and behaviors t facilitate oxygenation and minimize respiratory effort  - Oxygen supplementation based on oxygen saturation or ABGs  - Provide Smoking Cessation handout, if applicable  - Encourage broncho-pulmonary hygiene including cough, deep breathe, Incentive Spiromet Glucose maintained within prescribed range  Description: INTERVENTIONS:  - Monitor Blood Glucose as ordered  - Assess for signs and symptoms of hyperglycemia and hypoglycemia  - Administer ordered medications to maintain glucose within target range  - Asse and surrounding tissue  - Implement wound care per orders  - Initiate isolation precautions as appropriate  - Initiate Pressure Ulcer prevention bundle as indicated  Outcome: Progressing  Goal: Oral mucous membranes remain intact  Description: INTERVENTION with patient/family  Outcome: Progressing  Goal: Maintain proper alignment of affected body part  Description: INTERVENTIONS:  - Support and protect limb and body alignment per provider's orders  - Instruct and reinforce with patient and family use of appr

## 2021-01-06 NOTE — PROGRESS NOTES
[facility]  Progress Note    [address] [facility] Patient Status:  Inpatient    1960 MRN [id number]   [facility] 8NE-A Attending Yamilka Montez MD   Hosp Day # 2 PCP Cuauhtemoc Zhang MD     Subjective:    Sleeping this am    Objective [place] [phone] by (CST): Velia Cheng MD on 1/05/2021 at 8:17 PM         Medications reviewed.     • aspirin  81 mg Oral Daily   • metoprolol succinate  12.5 mg Oral Daily Beta Blocker   • Sertraline HCl  50 mg Oral Daily   • Insulin Aspart Pen  1-

## 2021-01-07 PROBLEM — I42.0 DCM (DILATED CARDIOMYOPATHY) (HCC): Status: ACTIVE | Noted: 2021-01-07

## 2021-01-07 PROBLEM — I50.22 CHRONIC SYSTOLIC CHF (CONGESTIVE HEART FAILURE) (HCC): Status: ACTIVE | Noted: 2021-01-07

## 2021-01-07 PROBLEM — Z86.711 HX OF PULMONARY EMBOLUS: Status: ACTIVE | Noted: 2021-01-07

## 2021-01-07 LAB
ALBUMIN SERPL-MCNC: 2.6 G/DL (ref 3.4–5)
ALBUMIN/GLOB SERPL: 0.7 {RATIO} (ref 1–2)
ALP LIVER SERPL-CCNC: 85 U/L
ALT SERPL-CCNC: 42 U/L
ANION GAP SERPL CALC-SCNC: 9 MMOL/L (ref 0–18)
APTT PPP: 113 SECONDS (ref 25.4–36.1)
APTT PPP: 72.8 SECONDS (ref 25.4–36.1)
AST SERPL-CCNC: 20 U/L (ref 15–37)
BASOPHILS # BLD AUTO: 0.06 X10(3) UL (ref 0–0.2)
BASOPHILS NFR BLD AUTO: 0.4 %
BILIRUB SERPL-MCNC: 1.5 MG/DL (ref 0.1–2)
BUN BLD-MCNC: 33 MG/DL (ref 7–18)
BUN/CREAT SERPL: 18.3 (ref 10–20)
CALCIUM BLD-MCNC: 8.6 MG/DL (ref 8.5–10.1)
CHLORIDE SERPL-SCNC: 105 MMOL/L (ref 98–112)
CO2 SERPL-SCNC: 24 MMOL/L (ref 21–32)
CREAT BLD-MCNC: 1.8 MG/DL
CREAT UR-SCNC: 184 MG/DL
CREAT UR-SCNC: 184 MG/DL
DEPRECATED RDW RBC AUTO: 53.9 FL (ref 35.1–46.3)
EOSINOPHIL # BLD AUTO: 0.06 X10(3) UL (ref 0–0.7)
EOSINOPHIL NFR BLD AUTO: 0.4 %
ERYTHROCYTE [DISTWIDTH] IN BLOOD BY AUTOMATED COUNT: 16.6 % (ref 11–15)
GLOBULIN PLAS-MCNC: 3.6 G/DL (ref 2.8–4.4)
GLUCOSE BLD-MCNC: 117 MG/DL (ref 70–99)
GLUCOSE BLD-MCNC: 122 MG/DL (ref 70–99)
GLUCOSE BLD-MCNC: 126 MG/DL (ref 70–99)
GLUCOSE BLD-MCNC: 137 MG/DL (ref 70–99)
GLUCOSE BLD-MCNC: 157 MG/DL (ref 70–99)
HAV IGM SER QL: 1.9 MG/DL (ref 1.6–2.6)
HCT VFR BLD AUTO: 39.6 %
HGB BLD-MCNC: 12.1 G/DL
IMM GRANULOCYTES # BLD AUTO: 0.05 X10(3) UL (ref 0–1)
IMM GRANULOCYTES NFR BLD: 0.4 %
LYMPHOCYTES # BLD AUTO: 1.29 X10(3) UL (ref 1–4)
LYMPHOCYTES NFR BLD AUTO: 9.4 %
M PROTEIN MFR SERPL ELPH: 6.2 G/DL (ref 6.4–8.2)
MCH RBC QN AUTO: 27.6 PG (ref 26–34)
MCHC RBC AUTO-ENTMCNC: 30.6 G/DL (ref 31–37)
MCV RBC AUTO: 90.4 FL
MICROALBUMIN UR-MCNC: 15.5 MG/DL
MICROALBUMIN/CREAT 24H UR-RTO: 84.2 UG/MG (ref ?–30)
MONOCYTES # BLD AUTO: 1.15 X10(3) UL (ref 0.1–1)
MONOCYTES NFR BLD AUTO: 8.4 %
NEUTROPHILS # BLD AUTO: 11.09 X10 (3) UL (ref 1.5–7.7)
NEUTROPHILS # BLD AUTO: 11.09 X10(3) UL (ref 1.5–7.7)
NEUTROPHILS NFR BLD AUTO: 81 %
OSMOLALITY SERPL CALC.SUM OF ELEC: 295 MOSM/KG (ref 275–295)
PLATELET # BLD AUTO: 264 10(3)UL (ref 150–450)
POTASSIUM SERPL-SCNC: 3.7 MMOL/L (ref 3.5–5.1)
PROT UR-MCNC: 39.1 MG/DL
PROT/CREAT UR-RTO: 0.21
RBC # BLD AUTO: 4.38 X10(6)UL
SODIUM SERPL-SCNC: 138 MMOL/L (ref 136–145)
SODIUM SERPL-SCNC: 16 MMOL/L
UUN UR-MCNC: 812 MG/DL
WBC # BLD AUTO: 13.7 X10(3) UL (ref 4–11)

## 2021-01-07 RX ORDER — CEFAZOLIN SODIUM/WATER 2 G/20 ML
2 SYRINGE (ML) INTRAVENOUS EVERY 12 HOURS
Status: DISCONTINUED | OUTPATIENT
Start: 2021-01-07 | End: 2021-01-13

## 2021-01-07 NOTE — PLAN OF CARE
Received pt at 0730. A&o x4. ST on tele. WNL on room air. Bilateral crackles. Denies chest pain and SOB. Unlabored breathing. +2 BLE edema and scrotal edema. Heparin gtt. Encouraged pt to ambulate in phillip. Declined at this time.  Will continue to educate an anxiety  - Utilize distraction and/or relaxation techniques  - Monitor for opioid side effects  - Notify MD/LIP if interventions unsuccessful or patient reports new pain  - Anticipate increased pain with activity and pre-medicate as appropriate  Outcome: P RESPIRATORY - ADULT  Goal: Achieves optimal ventilation and oxygenation  Description: INTERVENTIONS:  - Assess for changes in respiratory status  - Assess for changes in mentation and behavior  - Position to facilitate oxygenation and minimize respiratory collaborating with pharmacy to review patient's medication profile  - Implement strategies to promote bladder emptying  Outcome: Progressing     Problem: METABOLIC/FLUID AND ELECTROLYTES - ADULT  Goal: Glucose maintained within prescribed range  Descriptio skin care algorithm/standards of care as needed  Outcome: Progressing  Goal: Incision(s), wounds(s) or drain site(s) healing without S/S of infection  Description: INTERVENTIONS:  - Assess and document risk factors for pressure ulcer development  - Assess standing, transferring and ambulating w/ or w/o assistive devices  - Assist with transfers and ambulation using safe patient handling equipment as needed  - Ensure adequate protection for wounds/incisions during mobilization  - Obtain PT/OT consults as nee

## 2021-01-07 NOTE — CONSULTS
INFECTIOUS DISEASE [address] Patient Status:  Inpatient    1960 MRN [id number]   [facility] 8NE-A Attending Shaina Alston MD   Hosp Day # 3 PCP Cary Harris 50 % injection 50 mL, 50 mL, Intravenous, Q15 Min PRN **OR** glucose (DEX4) oral liquid 30 g, 30 g, Oral, Q15 Min PRN **OR** Glucose-Vitamin C (DEX-4) chewable tab 8 tablet, 8 tablet, Oral, Q15 Min PRN  •  Insulin Aspart Pen (NOVOLOG) 100 UNIT/ML flexpen 1 24.0   ALKPHO 93  --  80  --  85   AST 54*  --  47*  --  20   ALT 58  --  55  --  42   BILT 2.0  --  1.8  --  1.5   TP 6.6  --  5.8*  --  6.2*    < > = values in this interval not displayed.          Lab Results   Component Value Date    .0 01/07/202

## 2021-01-07 NOTE — PROGRESS NOTES
[facility] Cardiology Progress Note - [facility] Patient Status:  Inpatient    1960 MRN [id number]   [facility] 8NE-A Attending Shaina Alston MD   Hosp Day # 3 PCP Cary Harris MD     Subjective:  Hayden Farfan filed at 1/7/2021 0900  Gross per 24 hour   Intake 1032 ml   Output 950 ml   Net 82 ml       Last 3 Weights  01/07/21 0442 : 291 lb (132 kg)  01/06/21 0510 : 283 lb 8.2 oz (128.6 kg)  01/05/21 0404 : 290 lb 8 oz (131.8 kg)  01/04/21 0115 : 295 lb 6.7 oz (1 Known Allergies    Medications:    •  ceFAZolin sodium (ANCEF/KEFZOL) 2 GM/20ML premix IV syringe 2 g, 2 g, Intravenous, Q12H    •  metoprolol succinate (Toprol XL) partial tablet 12.5 mg, 12.5 mg, Oral, 2x Daily(Beta Blocker)    •  aspirin EC tab 81 mg, 8

## 2021-01-07 NOTE — PROGRESS NOTES
[facility]    Nephrology Progress Note    [address] [facility] Attending:  Jordana Navarro MD     Cc: MUMTAZ    SUBJECTIVE     Chills this morning and then tachycardia/tachypnea. Sp CT .  Now resolved    PHYSICAL EXAM   Vital signs: /74 (BP Location: acetaminophen (TYLENOL) tab 650 mg, 650 mg, Oral, Q6H PRN    •  melatonin tab 3 mg, 3 mg, Oral, Nightly PRN    •  ondansetron HCl (ZOFRAN) injection 4 mg, 4 mg, Intravenous, Q6H PRN        LABS     Lab Results   Component Value Date    WBC 15.6 01/06/2021 diaphragm to the pubic symphysis. Dose reduction techniques were     used. Dose information is transmitted to the [address] of     Radiology) NRDR ([facility]) which includes the Dose Index Registry.          PATIENT S noted above, there is a large right-sided indirect     inguinal hernia that contains the majority of the bladder.   There is a     mild degree of contrast within the bladder lumen likely relating to prior     contrast enhanced CT scanning of the     chest. 11:05 AM         Finalized by (CST): Imelda Adams, DO on 1/06/2021 at 11:19 AM        US ABDOMEN COMPLETE (CPT=76700)   Final Result    PROCEDURE:  US ABDOMEN COMPLETE (CPT=76700)         COMPARISON:  None.          INDICATIONS:  eval cirrhosis         TECH structures, Doppler spectral analysis, and color flow. Doppler imaging were performed. The     following veins were imaged bilaterally:  Common, deep, and superficial     femoral, popliteal, sapheno-femoral junction, and posterior tibial veins. with TASNEEM and constipation          CONTRAST USED:  100cc of Omnipaque 350         FINDINGS:      VASCULATURE:  There is a small to moderate burden of bilateral pulmonary     embolus which is seen in the main pulmonary artery which is partially     occl FINDINGS:      BOWEL GAS PATTERN:  Non-specific bowel gas pattern. There is no evidence     of free air. CALCIFICATIONS:  None significant.                               =====    CONCLUSION:  No evidence of bowel obstruction or free air. avoid nsaids, further IV contrast, other nephrotoxins. Renally dose meds      Acute on chronic HF -- cards on consult. ECHO noted.  Sp lasix, on hold today, can restart lasix tomorrow if Cr stable     HTN -- hold losartan in setting of MUMTAZ, consider restart

## 2021-01-07 NOTE — CONSULTS
[facility] Urology   Consultation Note    Michae Schilder Patient Status:  Inpatient    1960 MRN [id number]   [facility] 8NE-A Attending Domingo Schultz MD   Hosp Day # 3 PCP Arturo Martin MD     Reason for Consultation: and minimal on the left. There is also a 1.4 cm pulmonary nodule in the medial aspect of the right lung base which can be retrospectively seen on the recent CT scan of the chest and is unchanged and of   uncertain significance.   Short-term follow-up CT sc oral liquid 30 g, 30 g, Oral, Q15 Min PRN **OR** Glucose-Vitamin C (DEX-4) chewable tab 8 tablet, 8 tablet, Oral, Q15 Min PRN  •  Insulin Aspart Pen (NOVOLOG) 100 UNIT/ML flexpen 1-5 Units, 1-5 Units, Subcutaneous, TID CC and HS  •  acetaminophen (TYLENOL) PANCREAS:  Diffuse pancreatic atrophy and fatty infiltration. No focal pancreatic lesions are suggested. SPLEEN:  No enlargement or focal lesion. KIDNEYS:  There is hydronephrosis of the right kidney with diffuse hydroureter.   There is high density the medial aspect of the right lung base which can be retrospectively seen on the prior exam   and is nonspecific measuring 1.4 cm. Short-term follow-up imaging is recommended for further assessment.                     =====  CONCLUSION:    1.  There is a hydronephrosis    MUMTAZ on CKD  Serum creat 1.45-->1.33-->1.62-->1.59-->1.8  CT scan - mild right hydronephrosis, very large right sided indirect inguinal hernia that extends beyond the normal confines/field-of-view of CT.   This hernia sac contains the major

## 2021-01-07 NOTE — CONSULTS
[facility]  Report of Consultation    Morris Edwards Patient Status:  Inpatient    1960 MRN [id number]   [facility] 8NE-A Attending Bahman Jay MD   [facility] Day # 3 PCP Lilliam Tomas MD     21    Reason for Consultation: reports that he has never smoked. He has never used smokeless tobacco. He reports current alcohol use. He reports that he does not use drugs.     Allergies:    No Known Allergies    Current Medications:      Current Facility-Administered Medications:   •  c non tender, non distended  Scrotum enlarged with hernia contents soft, not able to fully reduce secondary to size. LYMPHATIC: no lymphadenopathy    EXTREMITIES: no cyanosis, clubbing or edema    .     Laboratory Data:  Recent Labs   Lab 01/03/21  1926 0 ANGIOGRAPHY, CHEST (CPT=71275)  COMPARISON:  [facility], CT CHEST PAIN PE W CONTRAST, 7/25/2016, 6:59 PM.  INDICATIONS:  constipation  TECHNIQUE:  IV contrast-enhanced multislice CT angiography is performed through the pulmonary arterial an Us Abdomen Complete (cpt=76700)    Result Date: 1/5/2021  PROCEDURE:  US ABDOMEN COMPLETE (CPT=76700)  COMPARISON:  None. INDICATIONS:  eval cirrhosis  TECHNIQUE:  Real time gray-scale ultrasound was used to evaluate the abdomen.   The exam includes im STATED HISTORY: (As transcribed by Technologist)  Patient presents with leg pain and swelling bilateral.    FINDINGS:  SAPHENOFEMORAL JUNCTION:  No reflux.  THROMBI:  There is occlusive thrombus throughout the entire visualized left lower extremity includin distending the right pelvic calyceal system and right ureter which likely represents residual contrast from the patient's prior contrast enhanced  CT scan of the chest, performed on 1/3/2021. The left kidney is unremarkable.  ADRENALS:  No mass or enlargem hernia that extends beyond the normal confines/field-of-view of the CT scan and measures at least 24.3 x 18.2 x 18.2 cm.   This hernia sac contains the majority of the bladder, the cecum including the  ileocecal valve, portions of the sigmoid colon and [employer]*                                                     [address]  [facility] Chris Sanchez, [address]                                                               [phone] reduced. Transvalvular velocity was    increased. There was mild to moderate stenosis. Peak velocity (S):    2.34m/sec. Mean gradient (S): 12mm Hg. 3. Mitral valve: Mildly calcified annulus.  Mitral valve demonstrates mildly    thickened, mildly calcified l aortic valve: 0.3. Valve area (VTI): 1.26cm^2. Indexed valve area (VTI): 0.62cm^2/m^2. Peak velocity ratio of LVOT to aortic valve: 0.27. Valve area (Vmax): 1.14cm^2. Indexed valve area (Vmax): 0.56cm^2/m^2. Mean gradient (S): 12mm Hg.  Peak gradient (S) Value             Reference  IVS thickness, ED, PLAX                      1.0   cm          0.6 - 1.0   LVOT                                         Value             Reference  LVOT ID, S                                   2.3   cm          ---- ---------   Left atrium                                  Value             Reference  LA ID, A-P, ES, MM                   (H)     6.3   cm          3.0 - 4.0  LA ID/bsa, A-P, ES, MM               (H)     3.1   cm/m^2      1. 5 - 2.3  LA/aortic root r List:    Patient Active Problem List:     Diabetes mellitus ([facility] [address]     Hypertension     Acute on chronic right-sided congestive heart failure (HCC)     Tachycardia     Elevated INR     History of alcoholism (HCC)     Renal insufficiency     Constipation, un

## 2021-01-07 NOTE — PLAN OF CARE
Pt is A&Ox4. RA, lungs diminished. ST HR low 100s on tele. Denies cardiovascular symptoms. Heparin gtt infusing, ptt tomorrow. Continent B&B, PVR after each void. Up x1 SBA. C/O headache, tylenol given with relief. Edema noted systemically.  Urology consult MD/LIP if interventions unsuccessful or patient reports new pain  - Anticipate increased pain with activity and pre-medicate as appropriate  Outcome: Progressing     Problem: SAFETY ADULT - FALL  Goal: Free from fall injury  Description: INTERVENTIONS:  - Assess for changes in respiratory status  - Assess for changes in mentation and behavior  - Position to facilitate oxygenation and minimize respiratory effort  - Oxygen supplementation based on oxygen saturation or ABGs  - Provide Smoking Cessation handout bladder emptying  Outcome: Progressing     Problem: METABOLIC/FLUID AND ELECTROLYTES - ADULT  Goal: Glucose maintained within prescribed range  Description: INTERVENTIONS:  - Monitor Blood Glucose as ordered  - Assess for signs and symptoms of hyperglycemi drain site(s) healing without S/S of infection  Description: INTERVENTIONS:  - Assess and document risk factors for pressure ulcer development  - Assess and document skin integrity  - Assess and document dressing/incision, wound bed, drain sites and surrou patient/family in tolerated activity level and precautions  - Recommend patient transfer to bedside chair toward strongest side  Outcome: Progressing     Problem: Impaired Activities of Daily Living  Goal: Achieve highest/safest level of independence in se

## 2021-01-07 NOTE — PROGRESS NOTES
[facility]  Progress Note    [address] [facility] Patient Status:  Inpatient    1960 MRN [id number]   [facility] 8NE-A Attending Carlos Manuel Chaudhary MD   Hosp Day # 3 PCP Eloy Martinez MD     Subjective:    He is well in bed.  Chart revi Result Value Ref Range    POC Glucose 216 (H) 70 - 99 mg/dL   PTT, ACTIVATED    Collection Time: 01/06/21  3:09 PM   Result Value Ref Range    PTT 75.2 (H) 25.4 - 36.1 seconds   POCT GLUCOSE    Collection Time: 01/06/21  5:07 PM   Result Value Ref Range x10(3) uL    Lymphocyte Absolute 1.29 1.00 - 4.00 x10(3) uL    Monocyte Absolute 1.15 (H) 0.10 - 1.00 x10(3) uL    Eosinophil Absolute 0.06 0.00 - 0.70 x10(3) uL    Basophil Absolute 0.06 0.00 - 0.20 x10(3) uL    Immature Granulocyte Absolute 0.05 0.00 - 1 Coleen Black DO on 1/06/2021 at 11:05 AM     Finalized by (CST): Coleen Black DO on 1/06/2021 at 11:19 AM         Medications reviewed.     • cefTRIAXone  2 g Intravenous Q24H   • metoprolol succinate  12.5 mg Oral 2x Daily(Beta Blocker)   • aspirin  81 m

## 2021-01-07 NOTE — PROGRESS NOTES
[facility] Hospitalist Progress Note                                                                   [address] [facility]  7/18/1960    SUBJECTIVE: No chest pain, palpitations, shortness of breat • Insulin Aspart Pen  1-5 Units Subcutaneous TID CC and HS     Continuous Infusions:   • Continuous dose Heparin infusion 1,600 Units/hr (01/06/21 1929)     PRN: glucose **OR** Glucose-Vitamin C **OR** dextrose **OR** glucose **OR** Glucose-Vitamin C, ac --> ceftriaxone 1 gm iv daily Day #2  -ID consulted  -trend    Tachycardia  -etiology uncertain  -sinus tach  -monitor on tele  -follow cardiology recc     Quality:  · DVT Prophylaxis: heparin drip  · CODE status: FULL code  · POA is his son Fermin Lopez  · Jerod:

## 2021-01-08 LAB
ALBUMIN SERPL-MCNC: 2.5 G/DL (ref 3.4–5)
ALBUMIN/GLOB SERPL: 0.6 {RATIO} (ref 1–2)
ALP LIVER SERPL-CCNC: 70 U/L
ALT SERPL-CCNC: 35 U/L
ANION GAP SERPL CALC-SCNC: 10 MMOL/L (ref 0–18)
APTT PPP: 66.7 SECONDS (ref 25.4–36.1)
AST SERPL-CCNC: 37 U/L (ref 15–37)
BASOPHILS # BLD AUTO: 0.06 X10(3) UL (ref 0–0.2)
BASOPHILS NFR BLD AUTO: 0.7 %
BILIRUB SERPL-MCNC: 1.2 MG/DL (ref 0.1–2)
BUN BLD-MCNC: 39 MG/DL (ref 7–18)
BUN/CREAT SERPL: 21.2 (ref 10–20)
CALCIUM BLD-MCNC: 8.6 MG/DL (ref 8.5–10.1)
CHLORIDE SERPL-SCNC: 105 MMOL/L (ref 98–112)
CO2 SERPL-SCNC: 21 MMOL/L (ref 21–32)
CREAT BLD-MCNC: 1.84 MG/DL
DEPRECATED RDW RBC AUTO: 52.6 FL (ref 35.1–46.3)
EOSINOPHIL # BLD AUTO: 0.04 X10(3) UL (ref 0–0.7)
EOSINOPHIL NFR BLD AUTO: 0.4 %
ERYTHROCYTE [DISTWIDTH] IN BLOOD BY AUTOMATED COUNT: 16.2 % (ref 11–15)
GLOBULIN PLAS-MCNC: 3.9 G/DL (ref 2.8–4.4)
GLUCOSE BLD-MCNC: 137 MG/DL (ref 70–99)
GLUCOSE BLD-MCNC: 141 MG/DL (ref 70–99)
GLUCOSE BLD-MCNC: 142 MG/DL (ref 70–99)
GLUCOSE BLD-MCNC: 157 MG/DL (ref 70–99)
GLUCOSE BLD-MCNC: 159 MG/DL (ref 70–99)
HAV IGM SER QL: 1.9 MG/DL (ref 1.6–2.6)
HCT VFR BLD AUTO: 38.6 %
HGB BLD-MCNC: 12 G/DL
IMM GRANULOCYTES # BLD AUTO: 0.06 X10(3) UL (ref 0–1)
IMM GRANULOCYTES NFR BLD: 0.7 %
LYMPHOCYTES # BLD AUTO: 1.27 X10(3) UL (ref 1–4)
LYMPHOCYTES NFR BLD AUTO: 14.3 %
M PROTEIN MFR SERPL ELPH: 6.4 G/DL (ref 6.4–8.2)
MCH RBC QN AUTO: 27.9 PG (ref 26–34)
MCHC RBC AUTO-ENTMCNC: 31.1 G/DL (ref 31–37)
MCV RBC AUTO: 89.8 FL
MONOCYTES # BLD AUTO: 1.03 X10(3) UL (ref 0.1–1)
MONOCYTES NFR BLD AUTO: 11.6 %
NEUTROPHILS # BLD AUTO: 6.44 X10 (3) UL (ref 1.5–7.7)
NEUTROPHILS # BLD AUTO: 6.44 X10(3) UL (ref 1.5–7.7)
NEUTROPHILS NFR BLD AUTO: 72.3 %
OSMOLALITY SERPL CALC.SUM OF ELEC: 294 MOSM/KG (ref 275–295)
PLATELET # BLD AUTO: 248 10(3)UL (ref 150–450)
POTASSIUM SERPL-SCNC: 3.8 MMOL/L (ref 3.5–5.1)
RBC # BLD AUTO: 4.3 X10(6)UL
SODIUM SERPL-SCNC: 136 MMOL/L (ref 136–145)
WBC # BLD AUTO: 8.9 X10(3) UL (ref 4–11)

## 2021-01-08 RX ORDER — METOPROLOL SUCCINATE 25 MG/1
25 TABLET, EXTENDED RELEASE ORAL
Status: DISCONTINUED | OUTPATIENT
Start: 2021-01-08 | End: 2021-01-13

## 2021-01-08 NOTE — PROGRESS NOTES
[facility]    Nephrology Progress Note    Laine Merida Attending:  Frankie Fowler MD     Cc: MUMTAZ    SUBJECTIVE     +BCx  Feels better today    PHYSICAL EXAM   Vital signs: /86 (BP Location: Left arm)   Pulse 106   Temp 97.9 °F (36.6 °C) ( 3 mg, Oral, Nightly PRN    •  ondansetron HCl (ZOFRAN) injection 4 mg, 4 mg, Intravenous, Q6H PRN        LABS     Lab Results   Component Value Date    WBC 13.7 01/07/2021    HGB 12.1 01/07/2021    HCT 39.6 01/07/2021    .0 01/07/2021    CREATSERUM scanning was performed from the dome     of the diaphragm to the pubic symphysis. Dose reduction techniques were     used.  Dose information is transmitted to the [facility] ([facility] [employer] of     Radiology) [address] [employer] ([address] [place] [address]) which inc along the flank regions. URINARY BLADDER:  As noted above, there is a large right-sided indirect     inguinal hernia that contains the majority of the bladder.   There is a     mild degree of contrast within the bladder lumen likely relating to prior Dictated by (CST): Coleen Black DO on 1/06/2021 at 11:05 AM         Finalized by (CST): Coleen Black DO on 1/06/2021 at 11:19 AM        US ABDOMEN COMPLETE (CPT=76700)   Final Result    PROCEDURE:  US ABDOMEN COMPLETE (CPT=76700)         COMPARISON:  Non B-mode two-dimensional images     of the vascular structures, Doppler spectral analysis, and color flow. Doppler imaging were performed.   The     following veins were imaged bilaterally:  Common, deep, and superficial     femoral, popliteal, sapheno-f HISTORY:(As transcribed by Technologist)  Patient presents     with TASNEEM and constipation          CONTRAST USED:  100cc of Omnipaque 350         FINDINGS:      VASCULATURE:  There is a small to moderate burden of bilateral pulmonary     embolus which is se Technologist)  Jihan Singh states     that he is constipated. FINDINGS:      BOWEL GAS PATTERN:  Non-specific bowel gas pattern. There is no evidence     of free air.     CALCIFICATIONS:  None significant.                               ===== avoid nsaids, further IV contrast, other nephrotoxins. Renally dose meds     R hydronephrosis -- urology consulted. Recommend obstruction at this time. Consider NT placement if worsening renal fxn     Acute on chronic HF -- cards on consult. ECHO noted.  Sp

## 2021-01-08 NOTE — PROGRESS NOTES
[facility] Hospitalist Progress Note                                                                   [address] [facility]  7/18/1960    SUBJECTIVE: No chest pain, palpitations, shortness of breat Daily(Beta Blocker)   • aspirin  81 mg Oral Daily   • Sertraline HCl  50 mg Oral Daily   • Insulin Aspart Pen  1-5 Units Subcutaneous TID CC and HS     Continuous Infusions:   • Continuous dose Heparin infusion 1,400 Units/hr (01/08/21 0502)     PRN: gluco tachycardia, fever 100.0, leukocytosis will recheck ua, check cxr and blood cxr--> blood cx pos for gram poss cocci--> ID and sens pending  -pt with cellulitis of the left LE --> cefazolin,  Day #3 of abx  -ID following  -trend    Tachycardia  -sinus tach Gladys Fields  (RN)  2019 04:27:46

## 2021-01-08 NOTE — PLAN OF CARE
Pt is A&Ox4. RA, lungs clear. ST on tele, denies cardiovascular symptoms. Continent B&B, LBM 1/7. Measuring PVRs, waiting for urine sample. Denies pain. BLE red, edema noted. Up x1 SBA. Heparin gtt infusing, ptt tomorrow. IV abx overnight. Qid accucheck. techniques  - Monitor for opioid side effects  - Notify MD/LIP if interventions unsuccessful or patient reports new pain  - Anticipate increased pain with activity and pre-medicate as appropriate  Outcome: Progressing     Problem: SAFETY ADULT - FALL  Goal ventilation and oxygenation  Description: INTERVENTIONS:  - Assess for changes in respiratory status  - Assess for changes in mentation and behavior  - Position to facilitate oxygenation and minimize respiratory effort  - Oxygen supplementation based on ox nutritional intake and initiate nutrition consult as needed  - Instruct patient on self management of diabetes  Outcome: Progressing  Goal: Electrolytes maintained within normal limits  Description: INTERVENTIONS:  - Monitor labs and rhythm and assess jeremy from bleeding injury  Description: (Example usage: patient with low platelets)  INTERVENTIONS:  - Avoid intramuscular injections, enemas and rectal medication administration  - Ensure safe mobilization of patient  - Hold pressure on venipuncture sites to a highest/safest level of independence in self care  Description: Interventions:  - Assess ability and encourage patient to participate in ADLs to maximize function  - Promote sitting position while performing ADLs such as feeding, grooming, and bathing  - E

## 2021-01-08 NOTE — CONSULTS
[facility]  Report of Inpatient Wound Care Consultation     [facility] Patient Status:  Inpatient    1960 MRN [id number]   [facility] 8NE-A Attending Beather Cooks, MD   Hosp Day # 4 PCP Jaime Henderson MD     REASON FOR --   --  28*  --  33*  --   --   --   --  39*  --   --   --    *   < > 103*  --   --  133*  --  126*  --   --   --   --  141*  --   --   --    CA 9.2   < > 8.8  --   --  8.6  --  8.6  --   --   --   --  8.6  --   --   --    ALB 2.7*  --  2.4*  -- for allowing me to participate in the care of your patient. Please call me at [phone] or page me at [phone] if you have any questions about this consultation and plan of care.   If unable to reach me at these, please call the Inpatient Wound Care pager at #[phone]

## 2021-01-08 NOTE — PLAN OF CARE
Received pt this morning, A&O X3, not in apparent distress on RA, SR/ST, 2-3+ bilat lower ext edema and scrotal edema, lower legs with mild redness and open wound, voiding freely, heparin drip infusing well per PE/DVT protocol.  Poc updated, IV abx, wound c SAFETY ADULT - FALL  Goal: Free from fall injury  Description: INTERVENTIONS:  - Assess pt frequently for physical needs  - Identify cognitive and physical deficits and behaviors that affect risk of falls.   - [place] fall precautions as indicated by asse Continuous cardiac monitoring, monitor vital signs, obtain 12 lead EKG if indicated  - Evaluate effectiveness of antiarrhythmic and heart rate control medications as ordered  - Initiate emergency measures for life threatening arrhythmias  - Monitor electro

## 2021-01-08 NOTE — PROGRESS NOTES
[facility]  Urology Progress Note    Shi Cochran Patient Status:  Inpatient    1960 MRN [id number]   [facility] 8NE-A Attending Jordana Navarro MD   [facility] Day # 4 PCP Chelsea Bertrand MD     Subjective:  Shi Cochran is a bowel  Nephrology and general surgery following as well.      Bladder is not distended on CT scan  UA does not appear infectious     Fever, leukocytosis, cellulitis, bacteremia, cellulitis of left LE  On abx  ID consulted     Bilateral PE  Extensive left D

## 2021-01-08 NOTE — PROGRESS NOTES
[facility]  Progress Note    Morris Edwards Patient Status:  Inpatient    1960 MRN [id number]   [facility] 8NE-A Attending Bahman Jay MD   Hosp Day # 4 PCP Lilliam Tomas MD     Subjective:    Patient reports urinating wit cardiomyopathy) ([facility] [address]     Chronic systolic CHF (congestive heart failure) (HCC)     Hx of pulmonary embolus      Impression:     62 y/o with large right inguinal hernia  Hernia is soft, non tender, able to reduce contents though will not remain reduced  Af

## 2021-01-08 NOTE — DIETARY NOTE
[facility]     Admitting diagnosis:  Tachycardia [R00.0]  Elevated INR [R79.1]  Renal insufficiency [N28.9]  Elevated troponin [R77.8]  History of alcoholism ([facility] [address] [F10.21]  Constipation, unspecified constipa learn-no barriers noted- motivated and receptive. Pt verbalizes understanding and has no further questions at this time. Outpatient DM center information provided. Patient reports good appetite at this time.   Tolerating po diet without diarrhea, emesis,

## 2021-01-08 NOTE — PROGRESS NOTES
[facility] Cardiology Progress Note - [facility] Patient Status:  Inpatient    1960 MRN [id number]   [facility] 8NE-A Attending Jordana Navarro MD   Hosp Day # 4 PCP Chelsea Bertrand MD     Subjective:  Mallory June kg)  01/07/21 0442 : 291 lb (132 kg)  01/06/21 0510 : 283 lb 8.2 oz (128.6 kg)  01/05/21 0404 : 290 lb 8 oz (131.8 kg)  01/04/21 0115 : 295 lb 6.7 oz (134 kg)  01/03/21 1908 : 275 lb (124.7 kg)  12/09/20 1316 : 279 lb (126.6 kg)  08/12/16 1035 : 225 lb (10 ceFAZolin sodium (ANCEF/KEFZOL) 2 GM/20ML premix IV syringe 2 g, 2 g, Intravenous, Q12H    •  metoprolol succinate (Toprol XL) partial tablet 12.5 mg, 12.5 mg, Oral, 2x Daily(Beta Blocker)    •  aspirin EC tab 81 mg, 81 mg, Oral, Daily    •  Sertraline HCl

## 2021-01-09 LAB
ALBUMIN SERPL-MCNC: 2.6 G/DL (ref 3.4–5)
ALBUMIN/GLOB SERPL: 0.7 {RATIO} (ref 1–2)
ALP LIVER SERPL-CCNC: 77 U/L
ALT SERPL-CCNC: 63 U/L
ANION GAP SERPL CALC-SCNC: 8 MMOL/L (ref 0–18)
APTT PPP: 76.9 SECONDS (ref 25.4–36.1)
AST SERPL-CCNC: 196 U/L (ref 15–37)
BASOPHILS # BLD AUTO: 0.06 X10(3) UL (ref 0–0.2)
BASOPHILS NFR BLD AUTO: 0.6 %
BILIRUB SERPL-MCNC: 1.4 MG/DL (ref 0.1–2)
BUN BLD-MCNC: 41 MG/DL (ref 7–18)
BUN/CREAT SERPL: 21.6 (ref 10–20)
CALCIUM BLD-MCNC: 9.3 MG/DL (ref 8.5–10.1)
CHLORIDE SERPL-SCNC: 105 MMOL/L (ref 98–112)
CO2 SERPL-SCNC: 25 MMOL/L (ref 21–32)
CREAT BLD-MCNC: 1.9 MG/DL
DEPRECATED RDW RBC AUTO: 53.1 FL (ref 35.1–46.3)
EOSINOPHIL # BLD AUTO: 0.05 X10(3) UL (ref 0–0.7)
EOSINOPHIL NFR BLD AUTO: 0.5 %
ERYTHROCYTE [DISTWIDTH] IN BLOOD BY AUTOMATED COUNT: 16.3 % (ref 11–15)
GLOBULIN PLAS-MCNC: 3.9 G/DL (ref 2.8–4.4)
GLUCOSE BLD-MCNC: 118 MG/DL (ref 70–99)
GLUCOSE BLD-MCNC: 126 MG/DL (ref 70–99)
GLUCOSE BLD-MCNC: 134 MG/DL (ref 70–99)
GLUCOSE BLD-MCNC: 134 MG/DL (ref 70–99)
GLUCOSE BLD-MCNC: 139 MG/DL (ref 70–99)
HAV IGM SER QL: 2.1 MG/DL (ref 1.6–2.6)
HCT VFR BLD AUTO: 39.7 %
HGB BLD-MCNC: 12.4 G/DL
IMM GRANULOCYTES # BLD AUTO: 0.05 X10(3) UL (ref 0–1)
IMM GRANULOCYTES NFR BLD: 0.5 %
LYMPHOCYTES # BLD AUTO: 1.33 X10(3) UL (ref 1–4)
LYMPHOCYTES NFR BLD AUTO: 13.8 %
M PROTEIN MFR SERPL ELPH: 6.5 G/DL (ref 6.4–8.2)
MCH RBC QN AUTO: 28 PG (ref 26–34)
MCHC RBC AUTO-ENTMCNC: 31.2 G/DL (ref 31–37)
MCV RBC AUTO: 89.6 FL
MONOCYTES # BLD AUTO: 1.37 X10(3) UL (ref 0.1–1)
MONOCYTES NFR BLD AUTO: 14.2 %
NEUTROPHILS # BLD AUTO: 6.8 X10 (3) UL (ref 1.5–7.7)
NEUTROPHILS # BLD AUTO: 6.8 X10(3) UL (ref 1.5–7.7)
NEUTROPHILS NFR BLD AUTO: 70.4 %
OSMOLALITY SERPL CALC.SUM OF ELEC: 297 MOSM/KG (ref 275–295)
PLATELET # BLD AUTO: 263 10(3)UL (ref 150–450)
POTASSIUM SERPL-SCNC: 4 MMOL/L (ref 3.5–5.1)
RBC # BLD AUTO: 4.43 X10(6)UL
SODIUM SERPL-SCNC: 138 MMOL/L (ref 136–145)
WBC # BLD AUTO: 9.7 X10(3) UL (ref 4–11)

## 2021-01-09 RX ORDER — SENNOSIDES 8.6 MG
8.6 TABLET ORAL 2 TIMES DAILY
Status: DISCONTINUED | OUTPATIENT
Start: 2021-01-09 | End: 2021-01-13

## 2021-01-09 RX ORDER — POLYETHYLENE GLYCOL 3350 17 G/17G
17 POWDER, FOR SOLUTION ORAL DAILY
Status: DISCONTINUED | OUTPATIENT
Start: 2021-01-09 | End: 2021-01-13

## 2021-01-09 NOTE — PROGRESS NOTES
[facility]                INFECTIOUS DISEASE PROGRESS NOTE    [address] [facility] Patient Status:  Inpatient    1960 MRN [id number]   [facility] 8NE-A Attending Mally Michaels MD   Hosp Day # 4 PCP Aftab Unger MD     Antibi 6.4       No results found for: [facility] Encounter on 01/03/21   1.  BLOOD CULTURE     Status: None (Preliminary result)    Collection Time: 01/07/21  1:48 PM    Specimen: Blood,peripheral   Result Value Ref Range    Blood Culture Res

## 2021-01-09 NOTE — PLAN OF CARE
Pt resting in bed. A&Ox4. Denies SOB, O2 sat WNL on RA. Denies pain. NSR on tele. Heparin gtt infusing at 1400 units (14 mL/hr). PTT therapeutic at 66.7, redraw in the am. Continent of bladder and bowel. Up with SBA. Call light in reach.    8 beats of vtach Problem: SAFETY ADULT - FALL  Goal: Free from fall injury  Description: INTERVENTIONS:  - Assess pt frequently for physical needs  - Identify cognitive and physical deficits and behaviors that affect risk of falls.   - [place] fall precautions as indica supplementation based on oxygen saturation or ABGs  - Provide Smoking Cessation handout, if applicable  - Encourage broncho-pulmonary hygiene including cough, deep breathe, Incentive Spirometry  - Assess the need for suctioning and perform as needed  - Ass INTERVENTIONS:  - Monitor Blood Glucose as ordered  - Assess for signs and symptoms of hyperglycemia and hypoglycemia  - Administer ordered medications to maintain glucose within target range  - Assess barriers to adequate nutritional intake and initiate n document skin integrity  - Assess and document dressing/incision, wound bed, drain sites and surrounding tissue  - Implement wound care per orders  - Initiate isolation precautions as appropriate  - Initiate Pressure Ulcer prevention bundle as indicated  O needed  - Advance activity as appropriate  - Communicate ordered activity level and limitations with patient/family  Outcome: Progressing  Goal: Maintain proper alignment of affected body part  Description: INTERVENTIONS:  - Support and protect limb and dennis

## 2021-01-09 NOTE — PROGRESS NOTES
[facility]                INFECTIOUS DISEASE PROGRESS NOTE    201 [facility] Patient Status:  Inpatient    1960 MRN [id number]   [facility] 8NE-A Attending Yael Montes MD   Hosp Day # 5 PCP Bandar Dodson MD     Antibi 01/03/21   1.  BLOOD CULTURE     Status: None (Preliminary result)    Collection Time: 01/07/21  1:48 PM    Specimen: Blood,peripheral   Result Value Ref Range    Blood Culture Result No Growth 2 Days N/A           Problem list reviewed:  Patient Active Pro

## 2021-01-09 NOTE — PROGRESS NOTES
[facility]    Nephrology Progress Note    Stacy Leger Attending:  Jay Valero MD     Cc: MUMTAZ    SUBJECTIVE     +BCx  Feels better today    PHYSICAL EXAM   Vital signs: /84 (BP Location: Left arm)   Pulse 101   Temp 97.8 °F (36.6 °C) ( melatonin tab 3 mg, 3 mg, Oral, Nightly PRN    •  ondansetron HCl (ZOFRAN) injection 4 mg, 4 mg, Intravenous, Q6H PRN        LABS     Lab Results   Component Value Date    WBC 8.9 01/08/2021    HGB 12.0 01/08/2021    HCT 38.6 01/08/2021    .0 01/08/ Unenhanced multislice CT scanning was performed from the dome     of the diaphragm to the pubic symphysis. Dose reduction techniques were     used.  Dose information is transmitted to the [facility] ([address] [place] [address] of     Radiology) Otoniel Vigil [address] [place] [address] Radiology extensive subcutaneous edema along the flank regions. URINARY BLADDER:  As noted above, there is a large right-sided indirect     inguinal hernia that contains the majority of the bladder.   There is a     mild degree of contrast within the bladder lumen assessment.               Dictated by (CST): Logan Dial DO on 1/06/2021 at 11:05 AM         Finalized by (CST): Logan Dial DO on 1/06/2021 at 11:19 AM        US ABDOMEN COMPLETE (CPT=76700)   Final Result    PROCEDURE:  US ABDOMEN COMPLETE (CPT=76700) extremity venous system. B-mode two-dimensional images     of the vascular structures, Doppler spectral analysis, and color flow. Doppler imaging were performed.   The     following veins were imaged bilaterally:  Common, deep, and superficial     femo PATIENT STATED HISTORY:(As transcribed by Technologist)  Patient presents     with TASNEEM and constipation          CONTRAST USED:  100cc of Omnipaque 350         FINDINGS:      VASCULATURE:  There is a small to moderate burden of bilateral pulmonary     emb transcribed by Technologist)  Tesha Leyva states     that he is constipated. FINDINGS:      BOWEL GAS PATTERN:  Non-specific bowel gas pattern. There is no evidence     of free air. CALCIFICATIONS:  None significant. 0.21g/g and UACR 84.2ug/mg)  -- holding lasix and ARB for now   -- urine na 16 yesterday, recheck urine lytes, if still prerenal and cr not improved tomorrow consider gentle IVfs (500cc) or albumin if ok w cards  -- avoid nsaids, further IV contrast, other

## 2021-01-09 NOTE — PROGRESS NOTES
[facility] Cardiology Progress Note - Shyrl Ormond [facility] Patient Status:  Inpatient    1960 MRN [id number]   [facility] 8NE-A Attending Carlos Manuel Chaudhary MD   Hosp Day # 5 PCP Eloy Martinez MD     Subjective:  Jacoby Lazcano [facility] 1 filed at 1/9/2021 0000  Gross per 24 hour   Intake 440 ml   Output 100 ml   Net 340 ml       Last 3 Weights  01/09/21 0401 : 292 lb 6.4 oz (132.6 kg)  01/08/21 0441 : 293 lb 10.4 oz (133.2 kg)  01/07/21 0442 : 291 lb (132 kg)  01/06/21 0510 dilated. 7. Tricuspid valve: Mild-moderate regurgitation. 8. Pulmonary arteries: PA peak pressure: 63mm Hg (S). No previous study was available for comparison.      Recent Labs   Lab 01/03/21 1926 01/03/21 1926 01/05/21  0525 01/06/21  8936 01/07/21 Q15 Min PRN    •  Insulin Aspart Pen (NOVOLOG) 100 UNIT/ML flexpen 1-5 Units, 1-5 Units, Subcutaneous, TID CC and HS    •  acetaminophen (TYLENOL) tab 650 mg, 650 mg, Oral, Q6H PRN    •  melatonin tab 3 mg, 3 mg, Oral, Nightly PRN    •  ondansetron HCl (ZO

## 2021-01-09 NOTE — PROGRESS NOTES
[facility]    Progress Note    [address] [facility] Patient Status:  Inpatient    1960 MRN [id number]   [facility] 8NE-A Attending Valeri Palma MD   Hosp Day # 5 PCP Taisha Malhotra MD        Subjective:   [address] [facility] i (H) 01/09/2021    INR 1.40 (H) 01/03/2021    TSH 3.81 05/18/2016    DDIMER 12.44 (H) 01/03/2021    MG 2.1 01/09/2021    TROP 0.180 (HH) 01/03/2021    ETOH <3 01/03/2021       No results found.              Zaina Melendez PA-C  1/9/2021

## 2021-01-09 NOTE — PROGRESS NOTES
[facility]    Nephrology Progress Note        SUBJECTIVE     Has not been drinking much because he is concerned about not being able to void        PHYSICAL EXAM   Vital signs: /89 (BP Location: Right arm)   Pulse 93   Temp 97.8 °F (36.6 °C) (Or HS    •  acetaminophen (TYLENOL) tab 650 mg, 650 mg, Oral, Q6H PRN    •  melatonin tab 3 mg, 3 mg, Oral, Nightly PRN    •  ondansetron HCl (ZOFRAN) injection 4 mg, 4 mg, Intravenous, Q6H PRN        LABS     Lab Results   Component Value Date    WBC 9.7 01/ PM.         INDICATIONS:  eval abn abd US         TECHNIQUE:  Unenhanced multislice CT scanning was performed from the dome     of the diaphragm to the pubic symphysis. Dose reduction techniques were     used.  Dose information is transmitted to the [facility] sigmoid colon and several loops of small bowel. There is extensive subcutaneous edema along the flank regions. URINARY BLADDER:  As noted above, there is a large right-sided indirect     inguinal hernia that contains the majority of the bladder.   [employer] scanning of the chest is     recommended in 3 months for further assessment.               Dictated by (CST): Jenaro Vickers DO on 1/06/2021 at 11:05 AM         Finalized by (CST): Jenaro Vickers DO on 1/06/2021 at 11:19 AM        58 Petar Martinez (YAY=675 scale, and duplex ultrasound was used to     evaluate the lower extremity venous system. B-mode two-dimensional images     of the vascular structures, Doppler spectral analysis, and color flow. Doppler imaging were performed.   The     following veins Data Registry) which includes the Dose     Index Registry.          PATIENT STATED HISTORY:(As transcribed by Technologist)  Patient presents     with TASNEEM and constipation          CONTRAST USED:  100cc of Omnipaque 350         FINDINGS:      VASCULATURE: INDICATIONS:  constipation         PATIENT STATED HISTORY: (As transcribed by Technologist)  Gailarlen Samantha states     that he is constipated. FINDINGS:      BOWEL GAS PATTERN:  Non-specific bowel gas pattern. There is no evidence     of free air. surgery consulted   -- UA w no RBCs  -- UPCR unremarkable (UPCR 0.21g/g and UACR 84.2ug/mg)  -- holding lasix and ARB for now   -- creatinine remains elevated, repeat urine studies   -- encourage oral hydration, may need fluid bolus if not improving.    --

## 2021-01-10 LAB
ANION GAP SERPL CALC-SCNC: 6 MMOL/L (ref 0–18)
APTT PPP: 102.8 SECONDS (ref 25.4–36.1)
APTT PPP: 110.1 SECONDS (ref 25.4–36.1)
APTT PPP: 60.5 SECONDS (ref 25.4–36.1)
BUN BLD-MCNC: 47 MG/DL (ref 7–18)
BUN/CREAT SERPL: 23.6 (ref 10–20)
CALCIUM BLD-MCNC: 9.1 MG/DL (ref 8.5–10.1)
CHLORIDE SERPL-SCNC: 104 MMOL/L (ref 98–112)
CO2 SERPL-SCNC: 24 MMOL/L (ref 21–32)
CREAT BLD-MCNC: 1.99 MG/DL
GLUCOSE BLD-MCNC: 139 MG/DL (ref 70–99)
GLUCOSE BLD-MCNC: 140 MG/DL (ref 70–99)
GLUCOSE BLD-MCNC: 141 MG/DL (ref 70–99)
GLUCOSE BLD-MCNC: 146 MG/DL (ref 70–99)
GLUCOSE BLD-MCNC: 170 MG/DL (ref 70–99)
OSMOLALITY SERPL CALC.SUM OF ELEC: 293 MOSM/KG (ref 275–295)
POTASSIUM SERPL-SCNC: 4.4 MMOL/L (ref 3.5–5.1)
SODIUM SERPL-SCNC: 134 MMOL/L (ref 136–145)

## 2021-01-10 NOTE — PROGRESS NOTES
[facility] Hospitalist Progress Note                                                                   [address]  7/18/1960    SUBJECTIVE: Pt sitting in bed, slept better.  breathing the 01/09/21  1709 01/09/21 2121 01/10/21  0524   PGLU 134* 134* 126* 139* 146*       Meds:   Scheduled:   • PEG 3350  17 g Oral Daily   • Senna  8.6 mg Oral BID   • metoprolol succinate  25 mg Oral 2x Daily(Beta Blocker)   • ceFAZolin  2 g Intravenous Q12H joseline     HLD  -lipid profile ok  -follow cardiology recc     Elevated INR  -given ETOH hx will check liver US to eval for cirrhosis --> no mention of cirrhosis on abd us    Fever  Leukocytosis  Cellulitis  Bacteremia  -given new chills, tachycardia, fever 1

## 2021-01-10 NOTE — PROGRESS NOTES
[facility] Hospitalist Progress Note                                                                   [address] [place]  7/18/1960    SUBJECTIVE: Pt laying in bed, hasn't sleep well.  Constipati 157* 134* 134* 126*       Meds:   Scheduled:   • PEG 3350  17 g Oral Daily   • Senna  8.6 mg Oral BID   • metoprolol succinate  25 mg Oral 2x Daily(Beta Blocker)   • ceFAZolin  2 g Intravenous Q12H   • aspirin  81 mg Oral Daily   • Sertraline HCl  50 mg Or INR  -given ETOH hx will check liver US to eval for cirrhosis --> no mention of cirrhosis on abd us    Fever  Leukocytosis  Cellulitis  Bacteremia  -given new chills, tachycardia, fever 100.0, leukocytosis will recheck ua, check cxr and blood cxr--> blood

## 2021-01-10 NOTE — PLAN OF CARE
Assumed care of patient at 0730. Alert and oriented. Vital signs stable. Sinus rhythm on telemetry. Pain managed with oral pain med. Patient with c/o constipation. PCP notified. Orders rec'd. Meds administered. Patient moved bowel.   Patient very hesist anxiety  - Utilize distraction and/or relaxation techniques  - Monitor for opioid side effects  - Notify MD/LIP if interventions unsuccessful or patient reports new pain  - Anticipate increased pain with activity and pre-medicate as appropriate  Outcome: P RESPIRATORY - ADULT  Goal: Achieves optimal ventilation and oxygenation  Description: INTERVENTIONS:  - Assess for changes in respiratory status  - Assess for changes in mentation and behavior  - Position to facilitate oxygenation and minimize respiratory collaborating with pharmacy to review patient's medication profile  - Implement strategies to promote bladder emptying  Outcome: Progressing     Problem: METABOLIC/FLUID AND ELECTROLYTES - ADULT  Goal: Glucose maintained within prescribed range  Descriptio skin care algorithm/standards of care as needed  Outcome: Progressing  Goal: Incision(s), wounds(s) or drain site(s) healing without S/S of infection  Description: INTERVENTIONS:  - Assess and document risk factors for pressure ulcer development  - Assess standing, transferring and ambulating w/ or w/o assistive devices  - Assist with transfers and ambulation using safe patient handling equipment as needed  - Ensure adequate protection for wounds/incisions during mobilization  - Obtain PT/OT consults as nee

## 2021-01-10 NOTE — PLAN OF CARE
Pt with tele NSR heparin gtt maintained dvt/pe protocol Ptt this am 60.5 increased to 1600 units and then ptt at 1400 110.1 decreased back to 1400 uits/hr ptt to be checks at 2100. Pt with left leg dressing reinforced. Pt up to BR with SBA denies pain.  Dayday Haley

## 2021-01-10 NOTE — PLAN OF CARE
Assumed care of patient around . Patient A&Ox4. On RA. NSR on tele. Mildly nauseous, some c/o LLE tenderness. Elevated on pillow. Wound dressings reinforced. Accuchecks QID. Heparin gtt infusing @ 1400, PTT to be drawn at 2100.   Son at bedside jia

## 2021-01-10 NOTE — PROGRESS NOTES
[facility]    Nephrology Progress Note        SUBJECTIVE     Increase in creatinine noted  States he is voiding slow, but still able to empty his bladder        PHYSICAL EXAM   Vital signs: /86 (BP Location: Left arm)   Pulse 85   Temp 97.7 °F ( TID CC and HS    •  acetaminophen (TYLENOL) tab 650 mg, 650 mg, Oral, Q6H PRN    •  melatonin tab 3 mg, 3 mg, Oral, Nightly PRN    •  ondansetron HCl (ZOFRAN) injection 4 mg, 4 mg, Intravenous, Q6H PRN        LABS     Lab Results   Component Value Date transmitted to the [facility] of     Radiology) [address] ([address]) which includes the Dose Index Registry.          PATIENT STATED HISTORY: (As transcribed by Technologist)  Hernia               FINDINGS:      LIVER:  No majority of the bladder. There is a     mild degree of contrast within the bladder lumen likely relating to prior     contrast enhanced CT scanning of the     chest.    PELVIC NODES:  No adenopathy. PELVIC ORGANS:  No visible mass.   Pelvic organs gregory ABDOMEN COMPLETE (CPT=76700)   Final Result    PROCEDURE:  US ABDOMEN COMPLETE (CPT=76700)         COMPARISON:  None. INDICATIONS:  eval cirrhosis         TECHNIQUE:  Real time gray-scale ultrasound was used to evaluate the     abdomen.   The exam i The     following veins were imaged bilaterally:  Common, deep, and superficial     femoral, popliteal, sapheno-femoral junction, and posterior tibial veins.          PATIENT STATED HISTORY: (As transcribed by Technologist)  Patient presents     with leg pa FINDINGS:      VASCULATURE:  There is a small to moderate burden of bilateral pulmonary     embolus which is seen in the main pulmonary artery which is partially     occlusive extending into the intralobar descending pulmonary artery and     basilar branch no evidence     of free air. CALCIFICATIONS:  None significant.                               =====    CONCLUSION:  No evidence of bowel obstruction or free air.                    Dictated by (CST): Randi Mcrae MD on 1/03/2021 at 10:07 PM         Britney avoid nsaids, further IV contrast, other nephrotoxins. Renally dose meds     R hydronephrosis -- urology consulted. Acute on chronic HF -- cards on consult. ECHO noted.  Sp lasix, on hold     HTN -- hold losartan       Anemia -- monitor     Hypoalbuminem

## 2021-01-10 NOTE — PROGRESS NOTES
[facility]  Progress Note    [address] [place] Patient Status:  Inpatient    1960 MRN [id number]   [facility] 8NE-A Attending Vanessa Acosta MD   [address] Day # 6 PCP Juanita Sigala MD     Subjective:  [address] [place] is a(n) Severe alcohol use disorder (HCC)     DCM (dilated cardiomyopathy) (HCC)     Chronic systolic CHF (congestive heart failure) (HCC)     Hx of pulmonary embolus      Worsening renal function  No sensory complaints of urinary retention    Plan:  He has still

## 2021-01-10 NOTE — PROGRESS NOTES
[facility] Cardiology Progress Note - [facility] Patient Status:  Inpatient    1960 MRN [id number]   [facility] 8NE-A Attending Melania Zamarripa MD   Hosp Day # 6 PCP Glorya Peabody, MD     Subjective:  Tali Beatty Last 3 Weights  01/10/21 0455 : 293 lb 3.4 oz (133 kg)  01/09/21 0401 : 292 lb 6.4 oz (132.6 kg)  01/08/21 0441 : 293 lb 10.4 oz (133.2 kg)  01/07/21 0442 : 291 lb (132 kg)  01/06/21 0510 : 283 lb 8.2 oz (128.6 kg)  01/05/21 0404 : 290 lb 8 oz (131.8 arteries: PA peak pressure: 63mm Hg (S). No previous study was available for comparison.      Recent Labs   Lab 01/03/21  1926 01/03/21  1926 01/05/21  0525 01/06/21  0742 01/07/21  0527 01/08/21  0455 01/09/21  0546   WBC 8.9   < > 7.0 15.6* 13.7* 8.9 9. Or    •  Glucose-Vitamin C (DEX-4) chewable tab 8 tablet, 8 tablet, Oral, Q15 Min PRN    •  Insulin Aspart Pen (NOVOLOG) 100 UNIT/ML flexpen 1-5 Units, 1-5 Units, Subcutaneous, TID CC and HS    •  acetaminophen (TYLENOL) tab 650 mg, 650 mg, Oral, Q6H PRN

## 2021-01-10 NOTE — PLAN OF CARE
Pt resting in chair. A&Ox4. Denies SOB, O2 sat WNL on tele. VSS. Denies pain. Complaints of nausea after eating dairy. Zofran given. Some emesis noted, mostly mucous. Fluids encouraged. Heparin gtt infusing @ 1400units.  PTT therapeutic, blood draw this am. increased pain with activity and pre-medicate as appropriate  Outcome: Progressing     Problem: SAFETY ADULT - FALL  Goal: Free from fall injury  Description: INTERVENTIONS:  - Assess pt frequently for physical needs  - Identify cognitive and physical defi behavior  - Position to facilitate oxygenation and minimize respiratory effort  - Oxygen supplementation based on oxygen saturation or ABGs  - Provide Smoking Cessation handout, if applicable  - Encourage broncho-pulmonary hygiene including cough, deep [place] ELECTROLYTES - ADULT  Goal: Glucose maintained within prescribed range  Description: INTERVENTIONS:  - Monitor Blood Glucose as ordered  - Assess for signs and symptoms of hyperglycemia and hypoglycemia  - Administer ordered medications to maintain glucose INTERVENTIONS:  - Assess and document risk factors for pressure ulcer development  - Assess and document skin integrity  - Assess and document dressing/incision, wound bed, drain sites and surrounding tissue  - Implement wound care per orders  - Initiate i adequate protection for wounds/incisions during mobilization  - Obtain PT/OT consults as needed  - Advance activity as appropriate  - Communicate ordered activity level and limitations with patient/family  Outcome: Progressing  Goal: Maintain proper alignm

## 2021-01-10 NOTE — PROGRESS NOTES
[facility]                INFECTIOUS DISEASE PROGRESS NOTE    [address] [facility] Patient Status:  Inpatient    1960 MRN [id number]   [facility] 8NE-A Attending Isis Lock MD   Hosp Day # 6 PCP Yolette Frederick MD     Antibi 6. 2* 6.4 6.5  --        No results found for: [facility] Encounter on 01/03/21   1.  BLOOD CULTURE     Status: None (Preliminary result)    Collection Time: 01/07/21  1:48 PM    Specimen: Blood,peripheral   Result Value Ref Range    Blo

## 2021-01-11 LAB
ANION GAP SERPL CALC-SCNC: 8 MMOL/L (ref 0–18)
APTT PPP: 75.6 SECONDS (ref 25.4–36.1)
BUN BLD-MCNC: 48 MG/DL (ref 7–18)
BUN/CREAT SERPL: 25.1 (ref 10–20)
CALCIUM BLD-MCNC: 9 MG/DL (ref 8.5–10.1)
CHLORIDE SERPL-SCNC: 104 MMOL/L (ref 98–112)
CO2 SERPL-SCNC: 22 MMOL/L (ref 21–32)
CREAT BLD-MCNC: 1.91 MG/DL
DEPRECATED RDW RBC AUTO: 53.1 FL (ref 35.1–46.3)
ERYTHROCYTE [DISTWIDTH] IN BLOOD BY AUTOMATED COUNT: 16 % (ref 11–15)
GLUCOSE BLD-MCNC: 118 MG/DL (ref 70–99)
GLUCOSE BLD-MCNC: 123 MG/DL (ref 70–99)
GLUCOSE BLD-MCNC: 137 MG/DL (ref 70–99)
GLUCOSE BLD-MCNC: 138 MG/DL (ref 70–99)
GLUCOSE BLD-MCNC: 214 MG/DL (ref 70–99)
HAV IGM SER QL: 2.1 MG/DL (ref 1.6–2.6)
HCT VFR BLD AUTO: 40.1 %
HGB BLD-MCNC: 12 G/DL
MCH RBC QN AUTO: 27.3 PG (ref 26–34)
MCHC RBC AUTO-ENTMCNC: 29.9 G/DL (ref 31–37)
MCV RBC AUTO: 91.1 FL
OSMOLALITY SERPL CALC.SUM OF ELEC: 292 MOSM/KG (ref 275–295)
PLATELET # BLD AUTO: 307 10(3)UL (ref 150–450)
POTASSIUM SERPL-SCNC: 4.5 MMOL/L (ref 3.5–5.1)
RBC # BLD AUTO: 4.4 X10(6)UL
SODIUM SERPL-SCNC: 134 MMOL/L (ref 136–145)
WBC # BLD AUTO: 10.3 X10(3) UL (ref 4–11)

## 2021-01-11 RX ORDER — PENICILLIN V POTASSIUM 500 MG/1
500 TABLET ORAL 3 TIMES DAILY
Qty: 90 TABLET | Refills: 0 | Status: SHIPPED | OUTPATIENT
Start: 2021-01-11 | End: 2021-02-10

## 2021-01-11 RX ORDER — FUROSEMIDE 20 MG/1
20 TABLET ORAL DAILY
Status: DISCONTINUED | OUTPATIENT
Start: 2021-01-11 | End: 2021-01-12

## 2021-01-11 NOTE — PAYOR COMM NOTE
--------------  CONTINUED STAY REVIEW    Payor: Ciro Amanda #:  [id number]  Authorization Number: [id number]    Admit date: 1/4/21  Admit time: 2 Charisse Suggs Physician: Thuy Cortés MD  Attending Physician:  Jennifer Ruby MD Pulse:  [89-94] 93  Resp:  [14-18] 14  BP: (114-126)/(80-90) 116/89     ASSESSMENT / PLAN:   61year old male with history of systolic HF with EF 68-82 %, DM type 2, htn, hld, hx of PE was on coumadin presenting with progressively worsening sob and dry cou -bowel regimen      Quality:  · DVT Prophylaxis: heparin drip  · CODE status: FULL code  · POA is his son Sera Cody  · [address] of care discussed with patient and staff  Dispo: no discharge  Reviewed chart including previous progress notes   Megan Weiss MEDICATIONS ADMINISTERED IN LAST 3 DAYS:   01/09/21 01/10/21 01/11/21   aspirin EC tab 81 mg   Dose: 81 mg  Freq: Daily Route: OR  Start: 01/06/21 0930    Admin Instructions:   Do not crush    0852-Given          0837-Given          0914-Given            c PEG 3350 (MIRALAX) powder packet 17 g   Dose: 17 g  Freq: Daily Route: OR  Start: 01/09/21 1100    Admin Instructions:   1 packet=17gm=1 heaping tablespoon; Dissolve in 8 oz of water    1120-Given          (0930)-Not Given [C]          0914-Given       Default antiemetic sequence (unless otherwise preferred by patient):  1. ondansetron (Zofran) 2. prochlorperazine (Compazine). Wait 15 minutes before proceeding to next medication in sequence.  Follow therapeutic duplication policy.      [id number]

## 2021-01-11 NOTE — PROGRESS NOTES
[facility]    Nephrology Progress Note    201 [facility] Attending:   Doris Zhu MD       SUBJECTIVE:     Has leg swelling  Voiding by pushing up on the hernia    PHYSICAL EXAM:     Vital Signs: BP (!) 119/96 (BP Location: Right arm)   Pu BAPERCENT 0.6 01/09/2021    NE 6.80 01/09/2021    LYMABS 1.33 01/09/2021    MOABSO 1.37 (H) 01/09/2021    EOABSO 0.05 01/09/2021    BAABSO 0.06 01/09/2021     Lab Results   Component Value Date    MALBP 15.50 01/07/2021    CREUR 184.00 01/07/2021    CREUR tablet, 8 tablet, Oral, Q15 Min PRN    •  Insulin Aspart Pen (NOVOLOG) 100 UNIT/ML flexpen 1-5 Units, 1-5 Units, Subcutaneous, TID CC and HS    •  acetaminophen (TYLENOL) tab 650 mg, 650 mg, Oral, Q6H PRN    •  melatonin tab 3 mg, 3 mg, Oral, Nightly PRN

## 2021-01-11 NOTE — PROGRESS NOTES
[facility]    Progress Note    201 [facility] Patient Status:  Inpatient    1960 MRN [id number]   [facility] 8NE-A Attending Gerard Andujar MD   Hosp Day # 7 PCP Chelsea Bertrand MD        Subjective:   Doing slightly abiola 134 (L) 01/11/2021    K 4.5 01/11/2021     01/11/2021    CO2 22.0 01/11/2021     (H) 01/11/2021    CA 9.0 01/11/2021    ALB 2.6 (L) 01/09/2021    ALKPHO 77 01/09/2021    BILT 1.4 01/09/2021    TP 6.5 01/09/2021     (H) 01/09/2021    ALT

## 2021-01-11 NOTE — PHYSICAL THERAPY NOTE
PHYSICAL THERAPY QUICK EVALUATION - INPATIENT    Room Number: 3709/3904-D  Evaluation Date: 1/11/2021  Presenting Problem: Acute PE  Physician Order: PT Eval and Treat     Pt admitted on 1/3/21 due to constipation, bloating and difficulty voiding.  Pt wit occasion), not using assistive device. Pt does not drive.     SUBJECTIVE  'I don't want to wear the mask, I don't need to wear it.'    OBJECTIVE  Precautions: Bed/chair alarm  Fall Risk: Standard fall risk    WEIGHT BEARING RESTRICTION  Weight Bearing Restr wear mask and required cueing to keep mask over nose throughout session. Mask donned 75% of the time. Pt lying in bed and resistant to participate in PT, required encouragement to participate and then did agree.  With [facility] elevated (as pt wouldn't let st Physical Therapy needs at this time. Patient discharged from Physical Therapy services. Please re-order if a new functional limitation presents during this admission. GOALS  Patient was able to achieve the following goals . ..     Patient was able to tr

## 2021-01-11 NOTE — PROGRESS NOTES
[facility]                INFECTIOUS DISEASE PROGRESS NOTE    201 [facility] Patient Status:  Inpatient    1960 MRN [id number]   [facility] 8NE-A Attending Ruth Ann Horowitz MD   Hosp Day # 7 PCP Viktoria Roberson MD     Antibi 01/10/21  1027 01/11/21  0530   * 141* 118* 140* 123*   BUN 33* 39* 41* 47* 48*   CREATSERUM 1.80* 1.84* 1.90* 1.99* 1.91*   GFRAA 46* 45* 43* 41* 43*   GFRNAA 40* 39* 37* 35* 37*   CA 8.6 8.6 9.3 9.1 9.0   ALB 2.6* 2.5* 2.6*  --   --     136

## 2021-01-11 NOTE — PROGRESS NOTES
01/11/21 0608   Clinical Encounter Type   Visited With Patient   Routine Visit Follow-up   Continue Visiting No   Patient's Supportive Strategies/Resources  provided emotional support   Sacramental Encounters   Sacrament Other  shared pr

## 2021-01-11 NOTE — OCCUPATIONAL THERAPY NOTE
OCCUPATIONAL THERAPY EVALUATION - INPATIENT     Room Number: 7018/1665-E  Evaluation Date: 1/11/2021  Type of Evaluation: Initial  Presenting Problem: (acute PE, LLE cellulitis)    Physician Order: IP Consult to Occupational Therapy  Reason for Therapy: AD assisting with lower body dressing as needed.     SUBJECTIVE   \"That was farther than yesterday\" ( tolerance for walking in phillip)    Patient self-stated goal is not stated     OBJECTIVE  Precautions: Bed/chair alarm  Fall Risk: Standard fall risk    WEIGH None  -   Taking care of personal grooming such as brushing teeth?: None  -   Eating meals?: None    AM-PAC Score:  Score: 20  Approx Degree of Impairment: 38.32%  Standardized Score (AM-PAC Scale): 42.03  CMS Modifier (G-Code): CJ    FUNCTIONAL TRANSFER A return safely to his prior level of function. The AM-TRU ' '6-Clicks' Inpatient Daily Activity Short Form was completed and  this patient  is demonstrating a  38% degree impairment in activities of daily living.  Research supports that patients with this l droplet mask, gloves, goggles. Patient wore a droplet mask.

## 2021-01-11 NOTE — PLAN OF CARE
PATIENT ALERT  AND ORIENTED X4 ON ROOM AIR , SINUS ON TELE MONITOR, DENIES ANY PAIN/ DISTRESS , LEFT LEG SWELLING AND CELLULITES WITH WOUNDS, WOUND CARE DONE ,DRESSING CHANGED AND ELEVATED , UNABLE TO OBTAIN STRICT OUTPUT DUE TO HERNIATED SCROTUM  AND SWEL for opioid side effects  - Notify MD/LIP if interventions unsuccessful or patient reports new pain  - Anticipate increased pain with activity and pre-medicate as appropriate  Outcome: Progressing     Problem: SAFETY ADULT - FALL  Goal: Free from fall injur oxygenation  Description: INTERVENTIONS:  - Assess for changes in respiratory status  - Assess for changes in mentation and behavior  - Position to facilitate oxygenation and minimize respiratory effort  - Oxygen supplementation based on oxygen saturation profile  - Implement strategies to promote bladder emptying  Outcome: Progressing     Problem: METABOLIC/FLUID AND ELECTROLYTES - ADULT  Goal: Glucose maintained within prescribed range  Description: INTERVENTIONS:  - Monitor Blood Glucose as ordered  - As Progressing  Goal: Incision(s), wounds(s) or drain site(s) healing without S/S of infection  Description: INTERVENTIONS:  - Assess and document risk factors for pressure ulcer development  - Assess and document skin integrity  - Assess and document dressin devices  - Assist with transfers and ambulation using safe patient handling equipment as needed  - Ensure adequate protection for wounds/incisions during mobilization  - Obtain PT/OT consults as needed  - Advance activity as appropriate  - Communicate orde pain and evaluate response  - Implement non-pharmacological measures as appropriate and evaluate response  - Consider cultural and social influences on pain and pain management  - Manage/alleviate anxiety  - Utilize distraction and/or relaxation techniques Assess patient’s ability to void and empty bladder  - Monitor intake/output and perform bladder scan as needed  - Follow urinary retention protocol/standard of care  - Consider collaborating with pharmacy to review patient's medication profile  - Implement and precau  Outcome: Progressing     Problem: Impaired Activities of Daily Living  Goal: Achieve highest/safest level of independence in self care  Description: Interventions:  - Assess ability and encourage patient to participate in ADLs to maximize funct

## 2021-01-11 NOTE — PROGRESS NOTES
[facility] Cardiology Progress Note - [facility] Patient Status:  Inpatient    1960 MRN [id number]   [facility] 8NE-A Attending Jazmine Aguirre MD   Hosp Day # 7 PCP Cuauhtemoc Zhang MD     Subjective: 1379.37 ml       Last 3 Weights  01/11/21 0500 : 293 lb 6.9 oz (133.1 kg)  01/10/21 0455 : 293 lb 3.4 oz (133 kg)  01/09/21 0401 : 292 lb 6.4 oz (132.6 kg)  01/08/21 0441 : 293 lb 10.4 oz (133.2 kg)  01/07/21 0442 : 291 lb (132 kg)  01/06/21 0510 : 283 lb 01/09/21  0546   ALT 55 42 35 63*   AST 47* 20 37 196*   ALB 2.4* 2.6* 2.5* 2.6*       No results for input(s): TROP in the last 168 hours.     Allergies:   No Known Allergies    Medications:    •  PEG 3350 (MIRALAX) powder packet 17 g, 17 g, Oral, Daily anxiety  Hematology: denies bruising or excessive bleeding  Endocrine: no history of diabetes  Allergy: see above drug allergies    Paola Melissa MD  1/11/2021  11:12 AM

## 2021-01-11 NOTE — PLAN OF CARE
Received patient at 0730. Alert and orientated x4. Tele Rhythm NSR. O2 saturation 97% on RA. Breath sounds diminished. No C/O chest pain or dizziness. Pt has enlarged scrotum, difficulty urinating. Pt ambulated in hallway w/o issues.  Heparin drip at 14mL/ ABGs  - Provide Smoking Cessation handout, if applicable  - Encourage broncho-pulmonary hygiene including cough, deep breathe, Incentive Spirometry  - Assess the need for suctioning and perform as needed  - Assess and instruct to report SOB or any respirat

## 2021-01-11 NOTE — PROGRESS NOTES
[facility] Hospitalist Progress Note                                                                   [address]  7/18/1960    SUBJECTIVE: Pt sitting side of bed, feeling little better. rivaroxaban  15 mg Oral BID with meals    Followed by   • [START ON 2/1/2021] rivaroxaban  20 mg Oral Daily with food   • PEG 3350  17 g Oral Daily   • Senna  8.6 mg Oral BID   • metoprolol succinate  25 mg Oral 2x Daily(Beta Blocker)   • ceFAZolin  2 g In joseline     HLD  -lipid profile ok  -follow cardiology recc     Elevated INR  -given ETOH hx, liver US to eval for cirrhosis --> no mention of cirrhosis on abd us    Fever  Leukocytosis  Cellulitis  Bacteremia  -given chills, tachycardia, fever 100.0, leukocyt

## 2021-01-11 NOTE — CDS QUERY
Blood Culture Results  [place]  Dear Doctor:  Clinical information (provided below) includes growth reported on blood culture results.  For accurate ICD-10-CM code assignment to reflect severity of illness and risk of morta

## 2021-01-12 LAB
ANION GAP SERPL CALC-SCNC: 9 MMOL/L (ref 0–18)
APTT PPP: 55.9 SECONDS (ref 25.4–36.1)
BUN BLD-MCNC: 54 MG/DL (ref 7–18)
BUN/CREAT SERPL: 29.3 (ref 10–20)
CALCIUM BLD-MCNC: 9.1 MG/DL (ref 8.5–10.1)
CHLORIDE SERPL-SCNC: 104 MMOL/L (ref 98–112)
CO2 SERPL-SCNC: 22 MMOL/L (ref 21–32)
CREAT BLD-MCNC: 1.84 MG/DL
GLUCOSE BLD-MCNC: 125 MG/DL (ref 70–99)
GLUCOSE BLD-MCNC: 130 MG/DL (ref 70–99)
GLUCOSE BLD-MCNC: 133 MG/DL (ref 70–99)
GLUCOSE BLD-MCNC: 154 MG/DL (ref 70–99)
GLUCOSE BLD-MCNC: 166 MG/DL (ref 70–99)
OSMOLALITY SERPL CALC.SUM OF ELEC: 297 MOSM/KG (ref 275–295)
POTASSIUM SERPL-SCNC: 4.3 MMOL/L (ref 3.5–5.1)
SODIUM SERPL-SCNC: 135 MMOL/L (ref 136–145)

## 2021-01-12 RX ORDER — HYDROXYZINE HCL 10 MG/5 ML
10 SOLUTION, ORAL ORAL EVERY 8 HOURS PRN
Status: DISCONTINUED | OUTPATIENT
Start: 2021-01-12 | End: 2021-01-13

## 2021-01-12 RX ORDER — FUROSEMIDE 40 MG/1
40 TABLET ORAL DAILY
Status: DISCONTINUED | OUTPATIENT
Start: 2021-01-13 | End: 2021-01-13

## 2021-01-12 RX ORDER — FUROSEMIDE 20 MG/1
20 TABLET ORAL ONCE
Status: COMPLETED | OUTPATIENT
Start: 2021-01-12 | End: 2021-01-12

## 2021-01-12 NOTE — PROGRESS NOTES
Per RN/Pharmacy cost of Xarelto should cost pt $4/month. Informed Dr. Kayla Anaya.     Lopez Temple PA-C.  1:29 PM

## 2021-01-12 NOTE — PROGRESS NOTES
[facility] Hospitalist Progress Note                                                                   [address] [facility]  7/18/1960    SUBJECTIVE: Pt sitting side on  bed, legs ok.  Urinated on/o furosemide  20 mg Oral Once   • rivaroxaban  15 mg Oral BID with meals    Followed by   • [START ON 2/1/2021] rivaroxaban  20 mg Oral Daily with food   • PEG 3350  17 g Oral Daily   • Senna  8.6 mg Oral BID   • metoprolol succinate  25 mg Oral 2x Daily(Bet to be started on oral on discharge  -outpt follow up for further management     HTN  -BP stable  -follow cards recc --> losartan started--> held due to [facility]  -may consider a trial again given sepsis may have contributed to ARF as well      HLD  -lipid profi

## 2021-01-12 NOTE — PROGRESS NOTES
[facility] Cardiology Progress Note - [facility] Patient Status:  Inpatient    1960 MRN [id number]   [facility] 8NE-A Attending Kilo Shah MD   Hosp Day # 8 PCP Rosina Delacruz MD     Subjective:  Izabela Starks Temp: 97.5 °F (36.4 °C)  Pulse: 73  Resp: 18  BP: 124/77    Intake/Output:     Intake/Output Summary (Last 24 hours) at 1/12/2021 1056  Last data filed at 1/12/2021 0807  Gross per 24 hour   Intake 650 ml   Output 1 ml   Net 649 ml       Last 3 Weights 104   CO2 24.0 24.0 21.0 25.0 24.0 22.0 22.0   BUN 28* 33* 39* 41* 47* 48* 54*   CREATSERUM 1.59* 1.80* 1.84* 1.90* 1.99* 1.91* 1.84*   CA 8.6 8.6 8.6 9.3 9.1 9.0 9.1   MG 1.8 1.9 1.9 2.1  --  2.1  --    * 126* 141* 118* 140* 123* 130*       Recent recent fevers  Skin: denies any unusual skin lesions or rashes  Eyes: no visual complaints or deficits  HEENT: denies nasal congestion, sinus pain; hearing loss negative  Respiratory: denies shortness of breath, wheezing or cough   Cardiovascular:  See HPI

## 2021-01-12 NOTE — PROGRESS NOTES
[facility]                INFECTIOUS DISEASE PROGRESS NOTE    201 [facility] Patient Status:  Inpatient    1960 MRN [id number]   [facility] 8NE-A Attending Jenise Adames MD   Hosp Day # 8 PCP Linda Menchaca MD     Antibi 136 138 134* 134* 135*   K 3.7 3.8 4.0 4.4 4.5 4.3    105 105 104 104 104   CO2 24.0 21.0 25.0 24.0 22.0 22.0   ALKPHO 85 70 77  --   --   --    AST 20 37 196*  --   --   --    ALT 42 35 63*  --   --   --    BILT 1.5 1.2 1.4  --   --   --    TP 6.2*

## 2021-01-12 NOTE — PROGRESS NOTES
[facility]  Urology Progress Note    Jovanni Harris Patient Status:  Inpatient    1960 MRN [id number]   [facility] 8NE-A Attending Croa Marmolejo MD   [facility] Day # 8 PCP Donell Da Silva MD     Subjective:  Jovanni Harris continues to worsen, consider hanson catheter placement; likely be permanent urinary intervention if patient is not a surgical candidate for hernia repair: patient declines hanson catheter placement.  Will monitor for now    Above discussed with nurse and

## 2021-01-12 NOTE — PROGRESS NOTES
[facility]                INFECTIOUS DISEASE PROGRESS NOTE    201 [facility] Patient Status:  Inpatient    1960 MRN [id number]   [facility] 8NE-A Attending Hortensia Guerrier MD   Hosp Day # 7 PCP Jarrell Patel MD     Antibi 105 104 104   CO2 24.0 21.0 25.0 24.0 22.0   ALKPHO 85 70 77  --   --    AST 20 37 196*  --   --    ALT 42 35 63*  --   --    BILT 1.5 1.2 1.4  --   --    TP 6.2* 6.4 6.5  --   --        No results found for: [facility] Encounter on 01

## 2021-01-12 NOTE — PROGRESS NOTES
[facility]                INFECTIOUS DISEASE PROGRESS NOTE    201 [facility] Patient Status:  Inpatient    1960 MRN [id number]   [facility] 8NE-A Attending Le Sánchez MD   Hosp Day # 8 PCP Taisha Malhotra MD     Antibi BUN 33* 39* 41* 47* 48* 54*   CREATSERUM 1.80* 1.84* 1.90* 1.99* 1.91* 1.84*   GFRAA 46* 45* 43* 41* 43* 45*   GFRNAA 40* 39* 37* 35* 37* 39*   CA 8.6 8.6 9.3 9.1 9.0 9.1   ALB 2.6* 2.5* 2.6*  --   --   --     136 138 134* 134* 135*   K 3.7 3.8 4.0

## 2021-01-12 NOTE — CM/SW NOTE
Spoke with patient regarding discharge planning. Patient resides with ex wife in an apartment in [place], located on the 3rd floor with elevator access. Per patient, prior to admission, independent with ADL's. No assistive devices or use of oxygen.

## 2021-01-12 NOTE — PLAN OF CARE
Patient alert x4 on room air ,lungs sounds clear , denies any chest pain / chest discomfort ,sinus on tele monitor,elevated and supported scrotum  Due to swelling, patient had lasix early shift , voiding good, daily weight on, iv ancef continued , skin a side effects  - Notify MD/LIP if interventions unsuccessful or patient reports new pain  - Anticipate increased pain with activity and pre-medicate as appropriate  Outcome: Progressing     Problem: SAFETY ADULT - FALL  Goal: Free from fall injury  Descript INTERVENTIONS:  - Assess for changes in respiratory status  - Assess for changes in mentation and behavior  - Position to facilitate oxygenation and minimize respiratory effort  - Oxygen supplementation based on oxygen saturation or ABGs  - Provide Smoking strategies to promote bladder emptying  Outcome: Progressing     Problem: METABOLIC/FLUID AND ELECTROLYTES - ADULT  Goal: Glucose maintained within prescribed range  Description: INTERVENTIONS:  - Monitor Blood Glucose as ordered  - Assess for signs and sy Incision(s), wounds(s) or drain site(s) healing without S/S of infection  Description: INTERVENTIONS:  - Assess and document risk factors for pressure ulcer development  - Assess and document skin integrity  - Assess and document dressing/incision, wound b with transfers and ambulation using safe patient handling equipment as needed  - Ensure adequate protection for wounds/incisions during mobilization  - Obtain PT/OT consults as needed  - Advance activity as appropriate  - Communicate ordered activity level

## 2021-01-12 NOTE — DIETARY NOTE
[facility]     Admitting diagnosis:  Tachycardia [R00.0]  Elevated INR [R79.1]  Renal insufficiency [N28.9]  Elevated troponin [R77.8]  History of alcoholism ([facility] [address] [F10.21]  Constipation, unspecified constipa time.  Also pt with elevated hemoglobin A1C of 8.5. Reviewed and provided handouts on carbohydrate counting/label reading.  Discussed the role of CHO/protein and fat in BS control, reviewed importance of portion control, timing of meals and eating out appro

## 2021-01-12 NOTE — PROGRESS NOTES
[facility]    Nephrology Progress Note    201 [facility] Attending:   Joshua Broderick MD       SUBJECTIVE:     No voiding complaints  Still with leg swelling  Gained 2lbs despite lasix yesterday    PHYSICAL EXAM:     Vital Signs: BP (!) 121/97 EOPERCENT 0.5 01/09/2021    BAPERCENT 0.6 01/09/2021    NE 6.80 01/09/2021    LYMABS 1.33 01/09/2021    MOABSO 1.37 (H) 01/09/2021    EOABSO 0.05 01/09/2021    BAABSO 0.06 01/09/2021     Lab Results   Component Value Date    MALBP 15.50 01/07/2021    DAVID (DEX4) oral liquid 30 g, 30 g, Oral, Q15 Min PRN    Or    •  Glucose-Vitamin C (DEX-4) chewable tab 8 tablet, 8 tablet, Oral, Q15 Min PRN    •  Insulin Aspart Pen (NOVOLOG) 100 UNIT/ML flexpen 1-5 Units, 1-5 Units, Subcutaneous, TID CC and HS    •  acetami

## 2021-01-12 NOTE — PLAN OF CARE
Rec'd pt awake and alert. No distress or discomfort assessed. Lungs diminished Pt has difficulty urinating d/t enlarged scrotum, hernia also causing doiffuiculty. Xarelto  started @ 15mg x 20days as orderd. Wounds to BLE dressing C/D/I. [facility]   Bed in low pos schedule  Outcome: Progressing     Problem: CARDIOVASCULAR - ADULT  Goal: Maintains optimal cardiac output and hemodynamic stability  Description: INTERVENTIONS:  - Monitor vital signs, rhythm, and trends  - Monitor for bleeding, hypotension and signs of d

## 2021-01-13 VITALS
DIASTOLIC BLOOD PRESSURE: 84 MMHG | HEIGHT: 68 IN | SYSTOLIC BLOOD PRESSURE: 124 MMHG | WEIGHT: 287.5 LBS | RESPIRATION RATE: 18 BRPM | HEART RATE: 74 BPM | BODY MASS INDEX: 43.57 KG/M2 | TEMPERATURE: 98 F | OXYGEN SATURATION: 97 %

## 2021-01-13 LAB
ANION GAP SERPL CALC-SCNC: 8 MMOL/L (ref 0–18)
BUN BLD-MCNC: 48 MG/DL (ref 7–18)
BUN/CREAT SERPL: 29.8 (ref 10–20)
CALCIUM BLD-MCNC: 8.6 MG/DL (ref 8.5–10.1)
CHLORIDE SERPL-SCNC: 106 MMOL/L (ref 98–112)
CO2 SERPL-SCNC: 21 MMOL/L (ref 21–32)
CREAT BLD-MCNC: 1.61 MG/DL
GLUCOSE BLD-MCNC: 135 MG/DL (ref 70–99)
GLUCOSE BLD-MCNC: 140 MG/DL (ref 70–99)
GLUCOSE BLD-MCNC: 167 MG/DL (ref 70–99)
NT-PROBNP SERPL-MCNC: 8944 PG/ML (ref ?–125)
OSMOLALITY SERPL CALC.SUM OF ELEC: 296 MOSM/KG (ref 275–295)
POTASSIUM SERPL-SCNC: 4.2 MMOL/L (ref 3.5–5.1)
SODIUM SERPL-SCNC: 135 MMOL/L (ref 136–145)

## 2021-01-13 RX ORDER — METOPROLOL SUCCINATE 25 MG/1
25 TABLET, EXTENDED RELEASE ORAL
Qty: 60 TABLET | Refills: 5 | Status: SHIPPED | OUTPATIENT
Start: 2021-01-13 | End: 2021-01-22

## 2021-01-13 RX ORDER — ASPIRIN 81 MG/1
81 TABLET ORAL DAILY
Qty: 30 TABLET | Refills: 11 | Status: SHIPPED | OUTPATIENT
Start: 2021-01-14

## 2021-01-13 RX ORDER — FUROSEMIDE 40 MG/1
40 TABLET ORAL DAILY
Qty: 30 TABLET | Refills: 5 | Status: SHIPPED | OUTPATIENT
Start: 2021-01-14 | End: 2021-02-19

## 2021-01-13 RX ORDER — CEFAZOLIN SODIUM/WATER 2 G/20 ML
2 SYRINGE (ML) INTRAVENOUS EVERY 8 HOURS
Status: DISCONTINUED | OUTPATIENT
Start: 2021-01-13 | End: 2021-01-13

## 2021-01-13 NOTE — PROGRESS NOTES
[facility] Cardiology Progress Note - [facility] Patient Status:  Inpatient    1960 MRN [id number]   [facility] 8NE-A Attending Charley Saleh, *   Hosp Day # 9 PCP Rosina Delacruz MD     Subjective: NSR    Physical Exam:    General: Alert and oriented x 3. No apparent distress. No respiratory or constitutional distress. HEENT: Normocephalic, anicteric sclera, neck supple, no thyromegaly or adenopathy. Neck: No JVD, carotids 2+, no bruits.   Cardiac: Followed by    •  [START ON 2/1/2021] Rivaroxaban (XARELTO) tab 20 mg, 20 mg, Oral, Daily with food    •  PEG 3350 (MIRALAX) powder packet 17 g, 17 g, Oral, Daily    •  Senna (SENOKOT) tab 8.6 mg, 8.6 mg, Oral, BID    •  Metoprolol Succinate ER (Toprol XL)

## 2021-01-13 NOTE — PROGRESS NOTES
[facility] Pharmacy Note:  Renal Adjustment for cefazolin (ANCEF)    Shi Cochran is a 61year old patient who has been prescribed cefazolin (ANCEF) 2 g every 12 hrs.   CrCl is estimated creatinine clearance is 47.2 mL/min (A) (based on SCr of 1.61 mg/dL (H)

## 2021-01-13 NOTE — PROGRESS NOTES
[facility]    Nephrology Progress Note    Fifi Vang Attending:  Kit Patel, *       SUBJECTIVE:     Urinating ok, but does not feel all the urine is coming out when he urinates  Weight down to 287 lbs    PHYSICAL EXAM:     Vital S EOPERCENT 0.5 01/09/2021    BAPERCENT 0.6 01/09/2021    NE 6.80 01/09/2021    LYMABS 1.33 01/09/2021    MOABSO 1.37 (H) 01/09/2021    EOABSO 0.05 01/09/2021    BAABSO 0.06 01/09/2021     Lab Results   Component Value Date    MALBP 15.50 01/07/2021    CR glucose (DEX4) oral liquid 30 g, 30 g, Oral, Q15 Min PRN    Or    •  Glucose-Vitamin C (DEX-4) chewable tab 8 tablet, 8 tablet, Oral, Q15 Min PRN    •  Insulin Aspart Pen (NOVOLOG) 100 UNIT/ML flexpen 1-5 Units, 1-5 Units, Subcutaneous, TID CC and HS    •

## 2021-01-13 NOTE — PROGRESS NOTES
[facility]                INFECTIOUS DISEASE PROGRESS NOTE    201 [facility] Patient Status:  Inpatient    1960 MRN [id number]   [facility] 8NE-A Attending Jay Valero MD   Hosp Day # 9 PCP Anil Rodriguez MD     Antibi 138 134* 134* 135*   K 3.7 3.8 4.0 4.4 4.5 4.3    105 105 104 104 104   CO2 24.0 21.0 25.0 24.0 22.0 22.0   ALKPHO 85 70 77  --   --   --    AST 20 37 196*  --   --   --    ALT 42 35 63*  --   --   --    BILT 1.5 1.2 1.4  --   --   --    TP 6.2* 6.4

## 2021-01-13 NOTE — PLAN OF CARE
Pt rec'd drowsy, denies any distress or discomfort. Pt wiht chronioc lymphedema to BLE, wounds to (L) leg with mepilex dressing C/D/I. Pt needs a lot of encouragement to get in chair for meals and to ambulate in phillip.   He prefers to lie in bed, occcasssi anxiety  - Utilize distraction and/or relaxation techniques  - Monitor for opioid side effects  - Notify MD/LIP if interventions unsuccessful or patient reports new pain  - Anticipate increased pain with activity and pre-medicate as appropriate  Outcome: P RESPIRATORY - ADULT  Goal: Achieves optimal ventilation and oxygenation  Description: INTERVENTIONS:  - Assess for changes in respiratory status  - Assess for changes in mentation and behavior  - Position to facilitate oxygenation and minimize respiratory collaborating with pharmacy to review patient's medication profile  - Implement strategies to promote bladder emptying  Outcome: Progressing     Problem: METABOLIC/FLUID AND ELECTROLYTES - ADULT  Goal: Glucose maintained within prescribed range  Descriptio

## 2021-01-13 NOTE — PROGRESS NOTES
[facility] Hospitalist Progress Note                                                                   [address] [place]  7/18/1960  C C: follow up    SUBJECTIVE :sitting up in chair, on RA. Followed by   • [START ON 2/1/2021] rivaroxaban  20 mg Oral Daily with food   • PEG 3350  17 g Oral Daily   • Senna  8.6 mg Oral BID   • metoprolol succinate  25 mg Oral 2x Daily(Beta Blocker)   • aspirin  81 mg Oral Daily   • Sertraline HCl  50 mg Oral Da CKD f/u with PCP to assess best oral diabetes medication for him  -outpt follow up for further management     HTN  -BP stable  -follow cards recc --> losartan started--> held due to joseline-resume when ok with renal      HLD  -lipid profile ok  -follow cardiol

## 2021-01-13 NOTE — CM/SW NOTE
Per nurse, notified Yesy mccall of need to fit patient with life[employer] today--await name and time of fitting

## 2021-01-13 NOTE — PLAN OF CARE
Patient alert and oriented , on room air , denies any chest pain/ chest discomfort ,on and off  Sleep , melatonin  Administered , skin itchy and rash  ,getting better , skin care and atrax administered , senna given , had bm , voided x3 , unable to measure new pain  - Anticipate increased pain with activity and pre-medicate as appropriate  Outcome: Progressing     Problem: SAFETY ADULT - FALL  Goal: Free from fall injury  Description: INTERVENTIONS:  - Assess pt frequently for physical needs  - Identify cogn changes in mentation and behavior  - Position to facilitate oxygenation and minimize respiratory effort  - Oxygen supplementation based on oxygen saturation or ABGs  - Provide Smoking Cessation handout, if applicable  - Encourage broncho-pulmonary hygiene METABOLIC/FLUID AND ELECTROLYTES - ADULT  Goal: Glucose maintained within prescribed range  Description: INTERVENTIONS:  - Monitor Blood Glucose as ordered  - Assess for signs and symptoms of hyperglycemia and hypoglycemia  - Administer ordered medications infection  Description: INTERVENTIONS:  - Assess and document risk factors for pressure ulcer development  - Assess and document skin integrity  - Assess and document dressing/incision, wound bed, drain sites and surrounding tissue  - Implement wound care equipment as needed  - Ensure adequate protection for wounds/incisions during mobilization  - Obtain PT/OT consults as needed  - Advance activity as appropriate  - Communicate ordered activity level and limitations with patient/family  Outcome: Progressing

## 2021-01-13 NOTE — PROGRESS NOTES
[facility]  Urology Progress Note    Armond Campo Patient Status:  Inpatient    1960 MRN [id number]   [facility] 8NE-A Attending Kristin Ortega, *   Hosp Day # 9 PCP Cuauhtemoc Zhang MD     Subjective:  Jessi Quesada hernia to help him void  -Continue to follow voiding status and renal function  -Discussed consideration for hanson catheter placement for urinary retention issues and/or worsening serum creat  -Patient to follow-up with urology in 2 weeks    [facility],

## 2021-01-13 NOTE — DISCHARGE SUMMARY
General Medicine Discharge Summary     Patient ID:  Herve Klein  61year old  [id number]  7/18/1960    Admit date: 1/3/2021    Discharge date and time:  1/13/21  Attending Physician: Nehemias Jackson, *     Primary Care Physician: Gregoria Yang hernia  -renal and  following  -pt able to \"support\" hernia to void; hanson if needed but hold off for now per    -check ua --> neg     Elevated Troponin  -likely related to pe and joseline  -echo ordered --> no acute pathology   -cardiology following  -fo fever  COMPARISON:  [facility] , XR, XR CHEST AP PORTABLE  (CPT=71045), 1/03/2021, 8:39 PM.  TECHNIQUE:  PA and lateral chest radiographs were obtained.   PATIENT STATED HISTORY: (As transcribed by Technologist)  Patient offered no additional history at this ti amount of thrombus seen within the right atrium. This measures 17 x 11 mm. There is cardiomegaly with moderate to severe LAD coronary calcifications. PLEURA:  There is a small right pleural effusion with a trace left pleural effusion.  CHEST WALL:  No mas pelvis may offer additional assessment of the etiology.      Dictated by (CST): Uri Perdomo MD on 1/05/2021 at 8:14 PM     Finalized by (CST): Uri Perdomo MD on 1/05/2021 at 8:17 PM       Us Venous Doppler Leg Bilat - Diag Img (cpt=93970)    Result Date: 1/ TECHNIQUE:  Unenhanced multislice CT scanning was performed from the dome of the diaphragm to the pubic symphysis. Dose reduction techniques were used.  Dose information is transmitted to the ACR ([address] [place] [address] of Radiology) Otoniel Vigil [address] [place] [address] Radiology Ludwin inguinal hernia that contains the majority of the bladder. There is a mild degree of contrast within the bladder lumen likely relating to prior contrast enhanced CT scanning of the chest. PELVIC NODES:  No adenopathy. PELVIC ORGANS:  No visible mass.   Pe radiograph was obtained.   COMPARISON:  [facility] , CT, CT ANGIOGRAPHY, CHEST (CPT=71275), 1/03/2021, 8:40 PM.  INDICATIONS:  constipation  PATIENT STATED HISTORY: (As transcribed by Technologist)  Patient states that he is bloating and has difficultly voiding study. Transthoracic echocardiography for ventricular function evaluation and assessment of valvular function. Image quality was suboptimal. The study was technically limited due to poor acoustic window availability and body habitus.  Intravenous contrast ( sufficient to allow evaluation of LV diastolic function or regional wall motion. Left atrium:  The left atrium was dilated. Right ventricle: The cavity size was normal. Systolic function was mildly reduced. Right atrium:  The atrium was dilated.  Mitral va ---------------------------------------------------------------------------- Measurements  Left ventricle                               Value             Reference  LV ID, ED, PLAX                      (H)     6.6   cm          4.2 - 5.9  LV ID, ES, PLAX VTI, S                          34.5  cm          ---------  Aortic mean gradient, S                      12    mm Hg       ---------  Aortic peak gradient, S                      22    mm Hg       ---------  VTI ratio, LVOT/AV                           0. by Lee Knutson M.D. 01/04/2021 12:02     Xr Abdomen Obstructive Series Routine(2 Vw)(cpt=74019)    Result Date: 1/3/2021  PROCEDURE:  XR ABDOMEN OBSTRUCTIVE SERIES ROUTINE(2 VW)(CPT=74019)  TECHNIQUE:  2 view obstructive series of the abdomen and pel Ria Ojeda as soon as possible for a visit in 2 week(s)  urology follow-up [address]  [facility]   Anne Marie Garcia [phone] Nurse Visit with GALDINO Muñoz  Friday Delroy 15, 2021 11:00 AM Cardiology - [address]

## 2021-01-14 ENCOUNTER — PATIENT OUTREACH (OUTPATIENT)
Dept: CASE MANAGEMENT | Age: 61
End: 2021-01-14

## 2021-01-14 NOTE — PAYOR COMM NOTE
--------------  DISCHARGE REVIEW    Payor: Valentino Glendy #:  [id number]  Authorization Number: [id number]    Admit date: 1/4/21  Admit time:  0101  Discharge Date: 1/13/2021  7:06 PM     Admitting Physician: Cam Hebert MD  Attending Phys discharge     Acute on Chronic Systolic CHF-improved  -Cardiology following  -strict I/o  -daily weights  -lasix iv held due to [facility]; trial lasix given swelling/weight   -echo reordered --> no acute changes  -lifevest on dc   -trial arb when ok c renal     COORDINATOR  IP CONSULT TO SPIRITUAL CARE  IP CONSULT TO SOCIAL WORK  IP CONSULT TO PULMONOLOGY  IP CONSULT TO VASCULAR SURGERY  IP CONSULT TO CARDIOLOGY  IP CONSULT TO HEMATOLOGY  IP CONSULT TO NEPHROLOGY  IP CONSULT TO UROLOGY  IP CONSULT TO INFECTIOUS D valve, portions of the sigmoid colon and several loops of small bowel. 2. There is hydronephrosis of the right kidney which is moderate in severity.   There is high density fluid within the pelvic calyceal system and ureter likely relating to residual contr 45mm Hg to 50mm Hg. 8. Pericardium, extracardiac: A small pericardial effusion was identified    posterior to the heart. There was no evidence of hemodynamic compromise. Impressions:  No previous study was available for comparison. Hari Leung [id number]  [phone]   [id number] New Patient Office Visit with Brody Christine MD  Tuesday Jan 19, 2021 1:15 PM  For the safety of our patients, visitors and care team, we are asking patients not to bring anyone with them to their appointmen

## 2021-01-14 NOTE — PROGRESS NOTES
1st attempt DM/PCP apt request    [address]  [phone]    Unable to contact patient. Will try again.

## 2021-01-15 NOTE — PROGRESS NOTES
2nd attempt DM/PCP apt request     Dr.Stacey Mitchell Abreu [address]  [phone]    Unable to contact patient. Will try again.

## 2021-01-18 NOTE — PROGRESS NOTES
3rd attempt DM/PCP apt request     Dr.Stacey Mitchell Abreu [address] [place] [address] Rd  [phone]    Unable to contact patient after multiple attempts. No apt made. Closing encounter.

## 2021-01-21 PROBLEM — E66.01 OBESITY, MORBID (HCC): Status: ACTIVE | Noted: 2021-01-21

## 2021-03-01 PROBLEM — F10.21 ALCOHOLISM IN REMISSION (HCC): Status: ACTIVE | Noted: 2021-01-04

## 2021-03-01 PROBLEM — H43.13 VITREOUS HEMORRHAGE OF BOTH EYES (HCC): Status: ACTIVE | Noted: 2021-03-01

## 2021-03-01 PROBLEM — I27.20 PULMONARY HYPERTENSION (HCC): Status: ACTIVE | Noted: 2021-03-01

## 2021-03-01 PROBLEM — I70.8 AORTO-ILIAC ATHEROSCLEROSIS (HCC): Status: ACTIVE | Noted: 2021-03-01

## 2021-03-01 PROBLEM — I70.0 AORTO-ILIAC ATHEROSCLEROSIS (HCC): Status: ACTIVE | Noted: 2021-03-01

## 2021-03-01 PROBLEM — I82.403 RECURRENT ACUTE DEEP VEIN THROMBOSIS (DVT) OF BOTH LOWER EXTREMITIES (HCC): Status: ACTIVE | Noted: 2021-01-04

## 2021-03-01 PROBLEM — E11.3593 PROLIFERATIVE DIABETIC RETINOPATHY OF BOTH EYES WITHOUT MACULAR EDEMA ASSOCIATED WITH TYPE 2 DIABETES MELLITUS (HCC): Status: ACTIVE | Noted: 2021-03-01

## 2021-05-07 PROBLEM — I26.94 MULTIPLE SUBSEGMENTAL PULMONARY EMBOLI WITHOUT ACUTE COR PULMONALE (HCC): Status: ACTIVE | Noted: 2021-05-07

## 2021-07-13 ENCOUNTER — APPOINTMENT (OUTPATIENT)
Dept: GENERAL RADIOLOGY | Facility: HOSPITAL | Age: 61
DRG: 853 | End: 2021-07-13
Attending: EMERGENCY MEDICINE
Payer: MEDICARE

## 2021-07-13 ENCOUNTER — APPOINTMENT (OUTPATIENT)
Dept: CT IMAGING | Facility: HOSPITAL | Age: 61
DRG: 853 | End: 2021-07-13
Attending: EMERGENCY MEDICINE
Payer: MEDICARE

## 2021-07-13 ENCOUNTER — HOSPITAL ENCOUNTER (INPATIENT)
Facility: HOSPITAL | Age: 61
LOS: 14 days | Discharge: NURSING, REHAB OR LONG-TERM CARE | DRG: 853 | End: 2021-07-27
Attending: EMERGENCY MEDICINE | Admitting: INTERNAL MEDICINE
Payer: MEDICARE

## 2021-07-13 DIAGNOSIS — R31.9 URINARY TRACT INFECTION WITH HEMATURIA, SITE UNSPECIFIED: ICD-10-CM

## 2021-07-13 DIAGNOSIS — N17.9 AKI (ACUTE KIDNEY INJURY) (HCC): ICD-10-CM

## 2021-07-13 DIAGNOSIS — L03.116 CELLULITIS OF LEFT FOOT: ICD-10-CM

## 2021-07-13 DIAGNOSIS — R65.21 SEPTIC SHOCK (HCC): Primary | ICD-10-CM

## 2021-07-13 DIAGNOSIS — A41.9 SEPTIC SHOCK (HCC): Primary | ICD-10-CM

## 2021-07-13 DIAGNOSIS — N39.0 URINARY TRACT INFECTION WITH HEMATURIA, SITE UNSPECIFIED: ICD-10-CM

## 2021-07-13 DIAGNOSIS — R33.9 URINARY RETENTION: ICD-10-CM

## 2021-07-13 LAB
ALBUMIN SERPL-MCNC: 1.5 G/DL (ref 3.4–5)
ALBUMIN SERPL-MCNC: 1.7 G/DL (ref 3.4–5)
ALBUMIN/GLOB SERPL: 0.4 {RATIO} (ref 1–2)
ALBUMIN/GLOB SERPL: 0.4 {RATIO} (ref 1–2)
ALP LIVER SERPL-CCNC: 102 U/L
ALP LIVER SERPL-CCNC: 84 U/L
ALT SERPL-CCNC: 16 U/L
ALT SERPL-CCNC: 20 U/L
ANION GAP SERPL CALC-SCNC: 10 MMOL/L (ref 0–18)
ANION GAP SERPL CALC-SCNC: 11 MMOL/L (ref 0–18)
AST SERPL-CCNC: 27 U/L (ref 15–37)
AST SERPL-CCNC: 31 U/L (ref 15–37)
ATRIAL RATE: 122 BPM
BASOPHILS # BLD: 0 X10(3) UL (ref 0–0.2)
BASOPHILS NFR BLD: 0 %
BILIRUB SERPL-MCNC: 1.2 MG/DL (ref 0.1–2)
BILIRUB SERPL-MCNC: 1.3 MG/DL (ref 0.1–2)
BILIRUB UR QL STRIP.AUTO: NEGATIVE
BUN BLD-MCNC: 69 MG/DL (ref 7–18)
BUN BLD-MCNC: 76 MG/DL (ref 7–18)
BUN/CREAT SERPL: 31.7 (ref 10–20)
BUN/CREAT SERPL: 33.8 (ref 10–20)
CALCIUM BLD-MCNC: 7.9 MG/DL (ref 8.5–10.1)
CALCIUM BLD-MCNC: 8.7 MG/DL (ref 8.5–10.1)
CHLORIDE SERPL-SCNC: 102 MMOL/L (ref 98–112)
CHLORIDE SERPL-SCNC: 105 MMOL/L (ref 98–112)
CO2 SERPL-SCNC: 18 MMOL/L (ref 21–32)
CO2 SERPL-SCNC: 19 MMOL/L (ref 21–32)
COLOR UR AUTO: YELLOW
CREAT BLD-MCNC: 2.04 MG/DL
CREAT BLD-MCNC: 2.4 MG/DL
DEPRECATED RDW RBC AUTO: 52.7 FL (ref 35.1–46.3)
EOSINOPHIL # BLD: 0 X10(3) UL (ref 0–0.7)
EOSINOPHIL NFR BLD: 0 %
ERYTHROCYTE [DISTWIDTH] IN BLOOD BY AUTOMATED COUNT: 17.2 % (ref 11–15)
ETHANOL SERPL-MCNC: <3 MG/DL (ref ?–3)
GLOBULIN PLAS-MCNC: 3.7 G/DL (ref 2.8–4.4)
GLOBULIN PLAS-MCNC: 4.4 G/DL (ref 2.8–4.4)
GLUCOSE BLD-MCNC: 173 MG/DL (ref 70–99)
GLUCOSE BLD-MCNC: 211 MG/DL (ref 70–99)
GLUCOSE BLD-MCNC: 215 MG/DL (ref 70–99)
GLUCOSE BLD-MCNC: 216 MG/DL (ref 70–99)
GLUCOSE BLD-MCNC: 268 MG/DL (ref 70–99)
GLUCOSE UR STRIP.AUTO-MCNC: NEGATIVE MG/DL
HAV IGM SER QL: 1.9 MG/DL (ref 1.6–2.6)
HCT VFR BLD AUTO: 35.4 %
HGB BLD-MCNC: 11.3 G/DL
INR BLD: 4.05 (ref 0.89–1.11)
INR BLD: 4.75 (ref 0.89–1.11)
KETONES UR STRIP.AUTO-MCNC: NEGATIVE MG/DL
LACTATE SERPL-SCNC: 1.4 MMOL/L (ref 0.4–2)
LACTATE SERPL-SCNC: 1.4 MMOL/L (ref 0.4–2)
LACTATE SERPL-SCNC: 2.1 MMOL/L (ref 0.4–2)
LACTATE SERPL-SCNC: 2.8 MMOL/L (ref 0.4–2)
LIPASE SERPL-CCNC: 58 U/L (ref 73–393)
LYMPHOCYTES NFR BLD: 0.31 X10(3) UL (ref 1–4)
LYMPHOCYTES NFR BLD: 1 %
M PROTEIN MFR SERPL ELPH: 5.2 G/DL (ref 6.4–8.2)
M PROTEIN MFR SERPL ELPH: 6.1 G/DL (ref 6.4–8.2)
MCH RBC QN AUTO: 26.8 PG (ref 26–34)
MCHC RBC AUTO-ENTMCNC: 31.9 G/DL (ref 31–37)
MCV RBC AUTO: 83.9 FL
MONOCYTES # BLD: 1.23 X10(3) UL (ref 0.1–1)
MONOCYTES NFR BLD: 4 %
MORPHOLOGY: NORMAL
NEUTROPHILS # BLD AUTO: 26.33 X10 (3) UL (ref 1.5–7.7)
NEUTROPHILS NFR BLD: 87 %
NEUTS BAND NFR BLD: 8 %
NEUTS HYPERSEG # BLD: 29.26 X10(3) UL (ref 1.5–7.7)
NITRITE UR QL STRIP.AUTO: NEGATIVE
OSMOLALITY SERPL CALC.SUM OF ELEC: 299 MOSM/KG (ref 275–295)
OSMOLALITY SERPL CALC.SUM OF ELEC: 305 MOSM/KG (ref 275–295)
P AXIS: 75 DEGREES
P-R INTERVAL: 142 MS
PH UR STRIP.AUTO: 5 [PH] (ref 5–8)
PLATELET # BLD AUTO: 481 10(3)UL (ref 150–450)
PLATELET MORPHOLOGY: NORMAL
POTASSIUM SERPL-SCNC: 3.8 MMOL/L (ref 3.5–5.1)
POTASSIUM SERPL-SCNC: 4.1 MMOL/L (ref 3.5–5.1)
PROCALCITONIN SERPL-MCNC: 0.82 NG/ML (ref ?–0.16)
PROT UR STRIP.AUTO-MCNC: NEGATIVE MG/DL
PSA SERPL DL<=0.01 NG/ML-MCNC: 40.3 SECONDS (ref 12.4–14.6)
PSA SERPL DL<=0.01 NG/ML-MCNC: 45.6 SECONDS (ref 12.4–14.6)
Q-T INTERVAL: 330 MS
QRS DURATION: 108 MS
QTC CALCULATION (BEZET): 470 MS
R AXIS: 121 DEGREES
RBC # BLD AUTO: 4.22 X10(6)UL
RBC #/AREA URNS AUTO: >10 /HPF
SARS-COV-2 RNA RESP QL NAA+PROBE: NOT DETECTED
SODIUM SERPL-SCNC: 131 MMOL/L (ref 136–145)
SODIUM SERPL-SCNC: 134 MMOL/L (ref 136–145)
SP GR UR STRIP.AUTO: 1.01 (ref 1–1.03)
T AXIS: 12 DEGREES
TOTAL CELLS COUNTED: 100
TROPONIN I SERPL-MCNC: <0.045 NG/ML (ref ?–0.04)
UROBILINOGEN UR STRIP.AUTO-MCNC: <2 MG/DL
VENTRICULAR RATE: 122 BPM
WBC # BLD AUTO: 30.8 X10(3) UL (ref 4–11)
WBC #/AREA URNS AUTO: >50 /HPF

## 2021-07-13 PROCEDURE — 84484 ASSAY OF TROPONIN QUANT: CPT | Performed by: EMERGENCY MEDICINE

## 2021-07-13 PROCEDURE — 87150 DNA/RNA AMPLIFIED PROBE: CPT | Performed by: EMERGENCY MEDICINE

## 2021-07-13 PROCEDURE — 99291 CRITICAL CARE FIRST HOUR: CPT

## 2021-07-13 PROCEDURE — 96365 THER/PROPH/DIAG IV INF INIT: CPT

## 2021-07-13 PROCEDURE — 93010 ELECTROCARDIOGRAM REPORT: CPT

## 2021-07-13 PROCEDURE — 71045 X-RAY EXAM CHEST 1 VIEW: CPT | Performed by: EMERGENCY MEDICINE

## 2021-07-13 PROCEDURE — 74176 CT ABD & PELVIS W/O CONTRAST: CPT | Performed by: EMERGENCY MEDICINE

## 2021-07-13 PROCEDURE — 85610 PROTHROMBIN TIME: CPT | Performed by: EMERGENCY MEDICINE

## 2021-07-13 PROCEDURE — 99292 CRITICAL CARE ADDL 30 MIN: CPT

## 2021-07-13 PROCEDURE — 73620 X-RAY EXAM OF FOOT: CPT | Performed by: EMERGENCY MEDICINE

## 2021-07-13 PROCEDURE — 82962 GLUCOSE BLOOD TEST: CPT

## 2021-07-13 PROCEDURE — 96361 HYDRATE IV INFUSION ADD-ON: CPT

## 2021-07-13 PROCEDURE — 87077 CULTURE AEROBIC IDENTIFY: CPT | Performed by: EMERGENCY MEDICINE

## 2021-07-13 PROCEDURE — 83690 ASSAY OF LIPASE: CPT | Performed by: EMERGENCY MEDICINE

## 2021-07-13 PROCEDURE — 87186 SC STD MICRODIL/AGAR DIL: CPT | Performed by: EMERGENCY MEDICINE

## 2021-07-13 PROCEDURE — 85610 PROTHROMBIN TIME: CPT | Performed by: INTERNAL MEDICINE

## 2021-07-13 PROCEDURE — 85007 BL SMEAR W/DIFF WBC COUNT: CPT | Performed by: EMERGENCY MEDICINE

## 2021-07-13 PROCEDURE — 51702 INSERT TEMP BLADDER CATH: CPT

## 2021-07-13 PROCEDURE — 80053 COMPREHEN METABOLIC PANEL: CPT | Performed by: INTERNAL MEDICINE

## 2021-07-13 PROCEDURE — 82077 ASSAY SPEC XCP UR&BREATH IA: CPT | Performed by: EMERGENCY MEDICINE

## 2021-07-13 PROCEDURE — 87040 BLOOD CULTURE FOR BACTERIA: CPT | Performed by: EMERGENCY MEDICINE

## 2021-07-13 PROCEDURE — 85027 COMPLETE CBC AUTOMATED: CPT | Performed by: EMERGENCY MEDICINE

## 2021-07-13 PROCEDURE — 85025 COMPLETE CBC W/AUTO DIFF WBC: CPT | Performed by: EMERGENCY MEDICINE

## 2021-07-13 PROCEDURE — 80053 COMPREHEN METABOLIC PANEL: CPT | Performed by: EMERGENCY MEDICINE

## 2021-07-13 PROCEDURE — 83605 ASSAY OF LACTIC ACID: CPT | Performed by: INTERNAL MEDICINE

## 2021-07-13 PROCEDURE — 84145 PROCALCITONIN (PCT): CPT | Performed by: EMERGENCY MEDICINE

## 2021-07-13 PROCEDURE — 83605 ASSAY OF LACTIC ACID: CPT | Performed by: EMERGENCY MEDICINE

## 2021-07-13 PROCEDURE — 93005 ELECTROCARDIOGRAM TRACING: CPT

## 2021-07-13 PROCEDURE — 87086 URINE CULTURE/COLONY COUNT: CPT | Performed by: EMERGENCY MEDICINE

## 2021-07-13 PROCEDURE — 81001 URINALYSIS AUTO W/SCOPE: CPT | Performed by: EMERGENCY MEDICINE

## 2021-07-13 PROCEDURE — 73630 X-RAY EXAM OF FOOT: CPT | Performed by: EMERGENCY MEDICINE

## 2021-07-13 PROCEDURE — 83735 ASSAY OF MAGNESIUM: CPT | Performed by: EMERGENCY MEDICINE

## 2021-07-13 PROCEDURE — 96366 THER/PROPH/DIAG IV INF ADDON: CPT

## 2021-07-13 RX ORDER — SEMAGLUTIDE 1.34 MG/ML
0.25 INJECTION, SOLUTION SUBCUTANEOUS WEEKLY
COMMUNITY

## 2021-07-13 RX ORDER — SODIUM CHLORIDE 9 MG/ML
INJECTION, SOLUTION INTRAVENOUS CONTINUOUS
Status: DISCONTINUED | OUTPATIENT
Start: 2021-07-13 | End: 2021-07-17

## 2021-07-13 RX ORDER — ACETAMINOPHEN 500 MG
TABLET ORAL
Status: COMPLETED
Start: 2021-07-13 | End: 2021-07-13

## 2021-07-13 RX ORDER — DEXTROSE MONOHYDRATE 25 G/50ML
50 INJECTION, SOLUTION INTRAVENOUS
Status: DISCONTINUED | OUTPATIENT
Start: 2021-07-13 | End: 2021-07-27

## 2021-07-13 RX ORDER — ACETAMINOPHEN 500 MG
1000 TABLET ORAL ONCE
Status: COMPLETED | OUTPATIENT
Start: 2021-07-13 | End: 2021-07-13

## 2021-07-13 RX ORDER — ACETAMINOPHEN 325 MG/1
650 TABLET ORAL EVERY 6 HOURS PRN
Status: DISCONTINUED | OUTPATIENT
Start: 2021-07-13 | End: 2021-07-27

## 2021-07-13 RX ORDER — NYSTATIN 100000 U/G
OINTMENT TOPICAL 2 TIMES DAILY
Status: DISCONTINUED | OUTPATIENT
Start: 2021-07-13 | End: 2021-07-27

## 2021-07-13 NOTE — PROGRESS NOTES
[facility] Pharmacy Note:  Renal Adjustment for piperacillin/tazobactam (Jim Ya)    Rusty Whitten is a 61year old patient who has been prescribed piperacillin/tazobactam (ZOSYN) 3.375 g every 8 hrs.   The estimated creatinine clearance is 32.7 mL/min (A) (bas

## 2021-07-13 NOTE — ED QUICK NOTES
Patient's leg's had dried blood from where he reports he was scratching at the wounds on his bilateral legs. Legs cleaned with water and iodine.  Patient has six wounds noted on his right lower leg with three larger and three smaller wounds - hair was washe

## 2021-07-13 NOTE — H&P
General Medicine H&P     Patient presents with:  Cellulitis       PCP: Armin Pineda DO    History of Present Illness: Patient is a 61year old male with PMH including but not limited to DM, HTN, CHF, obesity, PE, who p/t [facility] ED c weakness/lethargy. Sclera anicteric, EOMs intact. Nose: Nares normal,  Mucosa normal    Throat: Lips normal   Neck: Supple, symmetrical, trachea midline   Lungs:   Clear to auscultation bilaterally.  Normal effort   Chest wall:  No tenderness or deformity   Heart:  Regular 7/13/2021 at 7:49 AM     Finalized by (CST): Jose Castillo MD on 7/13/2021 at 7:50 AM       CT ABDOMEN+PELVIS(CPT=74176)    Result Date: 7/13/2021  PROCEDURE:  CT ABDOMEN+PELVIS (CPT=74176)  COMPARISON:  [facility], CT ABDOMEN+PELVIS(CPT=74176), 1/06/20 portions of multiple loops of small bowel and loops of colon including portions of the rectus sigmoid colon. These bowel loops are not distended. There is marked scrotal edema. ABDOMINAL WALL:  See above.   There is a small midline fat containing abdomina and elephantitis  PATIENT STATED HISTORY: (As transcribed by Technologist)  Patient offered no additional history at this time. CONCLUSION:    Stable cardiac enlargement at least moderate in degree.   Stable mild vascular congestion without acu than 50% face to face encounter.       Benita Vee MD  [facility] Hospitalist  Pager: [phone]  7/13/2021  0:01 PM      **Certification      PHYSICIAN Certification of Need for Inpatient Hospitalization - Initial Certification    Patient will require inp

## 2021-07-13 NOTE — CONSULTS
Critical Care H&P/Consult       NAME: [address] [place] [address] - ROOM: [address] [place] [address] - MRN: [id number] - Age: 61year old - :  1960    Date of Admission: 2021  6:06 AM  Admission Diagnosis: Urinary retention [R33.9]  MUMTAZ (acute kidney injury) ([facility] [address] [O77. mouth daily. , Disp: 90 tablet, Rfl: 1, Past Week at Unknown time  Rivaroxaban (XARELTO) 20 MG Oral Tab, Take 1 tablet (20 mg total) by mouth daily with food. , Disp: 90 tablet, Rfl: 1, Past Week at Unknown time  Sacubitril-Valsartan (ENTRESTO) 24-26 MG Oral Family:       Frequency of Social Gatherings with Friends and Family:       Attends Zoroastrianism Services:       Active Member of Clubs or Organizations:       Attends Club or Organization Meetings:       Marital Status:   Intimate Partner Violence:       Fea Weight:       Height:           Oxygen Therapy  SpO2: 96 %  O2 Device: None (Room air)                          Wt Readings from Last 3 Encounters:  07/13/21 : 275 lb (124.7 kg)  04/16/21 : 280 lb (127 kg)  04/08/21 : 191 lb (86.6 kg)        Intake/Outpu intact. Normal strength, sensation and reflexes       throughout         Labs reviewed as noted below    Imaging: CT abd pel- reviewed as noted above    ASSESSMENT/PLAN:  1. Septic Shock  -due to urinary source  -wean pressors as tolerated  2.  UTI  -due to

## 2021-07-13 NOTE — ED PROVIDER NOTES
Patient Seen in: [facility] Emergency Department      History   Patient presents with:  Cellulitis    Stated Complaint: retention and elephantitis    HPI/Subjective:   HPI    Patient is a 78-year-old gentleman with a history of diabetes hypertension (Room air)       Current:/63   Pulse 107   Temp (!) 101 °F (38.3 °C) (Temporal)   Resp 15   Ht 175.3 cm (5' 9\")   Wt 124.7 kg   SpO2 95%   BMI 40.61 kg/m²         Physical Exam  Vitals and nursing note reviewed.    Constitutional:       Appearance: H GFR, Non- 28 (*)     GFR, -American 33 (*)     Total Protein 6.1 (*)     Albumin 1.7 (*)     A/G Ratio 0.4 (*)     All other components within normal limits   URINALYSIS WITH CULTURE REFLEX - Abnormal; Notable for the following c Abnormality         Status                     ---------                               -----------         ------                     CBC W/ DIFFERENTIAL[[id number]]          Abnormal            Final result                 Please view results for these lio No mass or adenopathy. BOWEL/MESENTERY:  There is a massive right-sided inguinal hernia which has     increased significantly in size and measures 29.5 x 16.9 x 20.6 cm in the     greatest craniocaudad, transverse, AP dimensions.   There is a moderate increase in the perinephric fluid consistent     pyelonephritis. No definite bowel     obstruction.               Dictated by (CST): Bedelia Claude, MD on 7/13/2021 at 9:17 AM         Finalized by (CST): Bedelia Claude, MD on 7/13/2021 at 9:31 AM further imaging of the     foot needed, consider MRI follow-up.            Dictated by (CST): Amarilys Mercado MD on 7/13/2021 at 7:49 AM         Finalized by (CST): Amarilys Mercado MD on 7/13/2021 at 7:50 AM               EKG: Rate 122, intervals per EKG re and creatinine elevated at 76 and 2.4. Lipase is negative magnesium normal.  EKG shows some ST abnormalities in the lateral leads but troponin is negative thus far. He denies any chest pain. Javon Roughen     He has significant leukocytosis at around 30,800 urinalysis

## 2021-07-13 NOTE — CONSULTS
[facility] Urology   Consultation Note    Peggy Carey Patient Status:  Inpatient    1960 MRN [id number]   [facility] 4SW-A Attending Jenny Farias MD   Hosp Day # 0 PCP Caleb Mariano DO     Reason for Consu pertinent surgical history.   Family History   Problem Relation Age of Onset    Other (Other) Mother         accident    Hypertension Father     Heart Disorder Father         CABG    Other (Other) Father         Heart murmur    Other (Other) Sister pending    COVID test 7/13/21: negative    Imaging:  CT Scan  Abdominal/pelvic CT scan without contrast 7/13/21:  FINDINGS:     LIVER:  No enlargement, atrophy, abnormal density, or significant focal lesion.      BILIARY:  No visible dilatation or calcifica prostate. BONES:  No bony lesion or fracture. LUNG BASES:  No acute disease. Resolution of previous pleural effusions and focal round consolidation right lung base.    OTHER:  Again noted is cardiomegaly, coronary artery calcification, marked calcifi emboli without acute cor pulmonale (HCC)     Sepsis secondary to UTI ([facility] [address]     Septic shock (HCC)     MUMTAZ (acute kidney injury) ([facility] [address]     Cellulitis of left foot     Urinary retention     Urinary tract infection with hematuria, site unspecified    Large ri

## 2021-07-13 NOTE — CONSULTS
[facility]  Report of Consultation    [address] [facility] Patient Status:  Inpatient    1960 MRN [id number]   [facility] 4SW-A Attending Uvaldo Ruiz MD   [address] Day # 0 PCP Kt Guevara,      21    Reason for Con accident   • Hypertension Father    • Heart Disorder Father         CABG   • Other (Other) Father         Heart murmur   • Other (Other) Sister         asthma   • Cancer Son        Social History:     reports that he has never smoked.  He has never used smo labored breathing    CARDIOVASCULAR: RRR    ABDOMEN: soft, non tender non distended  Scrotum enlarged with bowel contents, soft, mild tenderness    LYMPHATIC: no lymphadenopathy    EXTREMITIES: no cyanosis, clubbing or edema    .     Laboratory Data:  Evelena Flattery Index Registry. PATIENT STATED HISTORY: (As transcribed by Technologist)  Patient is here with abdominal pain and swelling to his testicles    FINDINGS:  LIVER:  No enlargement, atrophy, abnormal density, or significant focal lesion.   BILIARY:  No visible BONES:  No bony lesion or fracture. LUNG BASES:  No acute disease. Resolution of previous pleural effusions and focal round consolidation right lung base.  OTHER:  Again noted is cardiomegaly, coronary artery calcification, marked calcification of the par List:    Patient Active Problem List:     Type 2 diabetes mellitus with stage 3a chronic kidney disease, without long-term current use of insulin (HCC)     Hypertension     Acute on chronic right-sided congestive heart failure (HCC)     Tachycardia     Tashia patient and reviewed all relevant labs and reports. I agree with her physical exam and the data listed in the report. I agree with the above listed assessment and plan.            Fan Scruggs  7/13/2021  4:57 PM

## 2021-07-13 NOTE — SEPSIS REASSESSMENT
[facility]    Sepsis Reassessment Note    BP 94/52   Pulse (!) 124   Temp (!) 101 °F (38.3 °C) (Temporal)   Resp 12   Ht 175.3 cm (5' 9\")   Wt 124.7 kg   SpO2 98%   BMI 40.61 kg/m²      I completed the sepsis reassessment at 733am    Cardiac:  Regu

## 2021-07-13 NOTE — ED QUICK NOTES
Patient gave verbal authorization to share information with his mother in law, Megan Jasmine.  Megan Jasmine was contacted at [phone] and given the update on patient's status

## 2021-07-13 NOTE — ED INITIAL ASSESSMENT (HPI)
61 Year old male c/o swelling to his testicles forcing him to urinate in a bucket and his penis has withdrawn.  A&Ox4

## 2021-07-13 NOTE — BH PROGRESS NOTE
[facility] Resource Referral Counselor Note    Smita Render Patient Status:  Inpatient    1960 MRN [id number]   [facility] 4SW-A Attending Althea Hdz MD   Hosp Day # 0 PCP DO ASYA Mcgovern(subject

## 2021-07-13 NOTE — PLAN OF CARE
Received pt from ED to . Pt placed on monitor, oriented to room and call light. Pt awake, oriented x 4. Tolerating room air. NSR-ST per monitor. Levophed infusing, titrated to keep SBP >90, MAP>60. [place] with blood and clots.  CBI initiated per [employer]

## 2021-07-14 LAB
ANION GAP SERPL CALC-SCNC: 6 MMOL/L (ref 0–18)
BASOPHILS # BLD AUTO: 0.06 X10(3) UL (ref 0–0.2)
BASOPHILS NFR BLD AUTO: 0.2 %
BUN BLD-MCNC: 73 MG/DL (ref 7–18)
BUN/CREAT SERPL: 43.2 (ref 10–20)
C DIFF TOX B STL QL: NEGATIVE
CALCIUM BLD-MCNC: 8 MG/DL (ref 8.5–10.1)
CHLORIDE SERPL-SCNC: 108 MMOL/L (ref 98–112)
CO2 SERPL-SCNC: 23 MMOL/L (ref 21–32)
CREAT BLD-MCNC: 1.69 MG/DL
DEPRECATED RDW RBC AUTO: 52.4 FL (ref 35.1–46.3)
EOSINOPHIL # BLD AUTO: 0.06 X10(3) UL (ref 0–0.7)
EOSINOPHIL NFR BLD AUTO: 0.2 %
ERYTHROCYTE [DISTWIDTH] IN BLOOD BY AUTOMATED COUNT: 17.2 % (ref 11–15)
GLUCOSE BLD-MCNC: 194 MG/DL (ref 70–99)
GLUCOSE BLD-MCNC: 199 MG/DL (ref 70–99)
GLUCOSE BLD-MCNC: 222 MG/DL (ref 70–99)
GLUCOSE BLD-MCNC: 231 MG/DL (ref 70–99)
GLUCOSE BLD-MCNC: 269 MG/DL (ref 70–99)
HAV IGM SER QL: 1.8 MG/DL (ref 1.6–2.6)
HCT VFR BLD AUTO: 31.8 %
HGB BLD-MCNC: 10.2 G/DL
IMM GRANULOCYTES # BLD AUTO: 0.25 X10(3) UL (ref 0–1)
IMM GRANULOCYTES NFR BLD: 0.9 %
LYMPHOCYTES # BLD AUTO: 1.16 X10(3) UL (ref 1–4)
LYMPHOCYTES NFR BLD AUTO: 4.3 %
MCH RBC QN AUTO: 27 PG (ref 26–34)
MCHC RBC AUTO-ENTMCNC: 32.1 G/DL (ref 31–37)
MCV RBC AUTO: 84.1 FL
MONOCYTES # BLD AUTO: 2.56 X10(3) UL (ref 0.1–1)
MONOCYTES NFR BLD AUTO: 9.5 %
NEUTROPHILS # BLD AUTO: 22.78 X10 (3) UL (ref 1.5–7.7)
NEUTROPHILS # BLD AUTO: 22.78 X10(3) UL (ref 1.5–7.7)
NEUTROPHILS NFR BLD AUTO: 84.9 %
OSMOLALITY SERPL CALC.SUM OF ELEC: 311 MOSM/KG (ref 275–295)
PHOSPHATE SERPL-MCNC: 3.4 MG/DL (ref 2.5–4.9)
PLATELET # BLD AUTO: 464 10(3)UL (ref 150–450)
POTASSIUM SERPL-SCNC: 3.3 MMOL/L (ref 3.5–5.1)
RBC # BLD AUTO: 3.78 X10(6)UL
SODIUM SERPL-SCNC: 137 MMOL/L (ref 136–145)
WBC # BLD AUTO: 26.9 X10(3) UL (ref 4–11)

## 2021-07-14 PROCEDURE — P9045 ALBUMIN (HUMAN), 5%, 250 ML: HCPCS | Performed by: NURSE PRACTITIONER

## 2021-07-14 PROCEDURE — 80048 BASIC METABOLIC PNL TOTAL CA: CPT | Performed by: INTERNAL MEDICINE

## 2021-07-14 PROCEDURE — 87493 C DIFF AMPLIFIED PROBE: CPT | Performed by: INTERNAL MEDICINE

## 2021-07-14 PROCEDURE — 83735 ASSAY OF MAGNESIUM: CPT | Performed by: INTERNAL MEDICINE

## 2021-07-14 PROCEDURE — 87040 BLOOD CULTURE FOR BACTERIA: CPT | Performed by: INTERNAL MEDICINE

## 2021-07-14 PROCEDURE — 82962 GLUCOSE BLOOD TEST: CPT

## 2021-07-14 PROCEDURE — 84100 ASSAY OF PHOSPHORUS: CPT | Performed by: NURSE PRACTITIONER

## 2021-07-14 PROCEDURE — 99214 OFFICE O/P EST MOD 30 MIN: CPT

## 2021-07-14 PROCEDURE — 85025 COMPLETE CBC W/AUTO DIFF WBC: CPT | Performed by: INTERNAL MEDICINE

## 2021-07-14 RX ORDER — POTASSIUM CHLORIDE 20 MEQ/1
40 TABLET, EXTENDED RELEASE ORAL ONCE
Status: COMPLETED | OUTPATIENT
Start: 2021-07-14 | End: 2021-07-14

## 2021-07-14 RX ORDER — MAGNESIUM OXIDE 400 MG (241.3 MG MAGNESIUM) TABLET
400 TABLET ONCE
Status: COMPLETED | OUTPATIENT
Start: 2021-07-14 | End: 2021-07-14

## 2021-07-14 RX ORDER — ALBUMIN, HUMAN INJ 5% 5 %
250 SOLUTION INTRAVENOUS ONCE
Status: COMPLETED | OUTPATIENT
Start: 2021-07-14 | End: 2021-07-14

## 2021-07-14 RX ORDER — FAMOTIDINE 20 MG/1
20 TABLET ORAL 2 TIMES DAILY
Status: DISCONTINUED | OUTPATIENT
Start: 2021-07-14 | End: 2021-07-14

## 2021-07-14 RX ORDER — FAMOTIDINE 20 MG/1
20 TABLET ORAL DAILY
Status: DISCONTINUED | OUTPATIENT
Start: 2021-07-15 | End: 2021-07-15

## 2021-07-14 NOTE — PLAN OF CARE
Pt a/o x4, forgetful. Denies pain. Tolerating RA. NSR observed to bedside monitor. BCx (+), CC MD aware. Pt refusing central line placement. Norepinephrine weaned to vasopressin per CC APN. Education provided, needs reinforcement. Enlarged scrotum noted.  C

## 2021-07-14 NOTE — PROGRESS NOTES
[facility] Pharmacy Note:  Initiation of Stress Ulcer Prophylaxis     Angeli Fish is a 61year old patient and meets criteria for the initiation of stress ulcer prophylaxis based on the presence of: A Coagulopathy and sepsis.     famotidine (PEPCID) has bee

## 2021-07-14 NOTE — PROGRESS NOTES
[facility]  Progress Note    [address] [facility] Patient Status:  Inpatient    1960 MRN [id number]   [facility] 4SW-A Attending Alex Massey MD   Hosp Day # 1 PCP Brennon Jung DO     Subjective:    Patient reports anuradha extremities (HCC)     Anxiety disorder     Severe alcohol use disorder (HCC)     DCM (dilated cardiomyopathy) (HCC)     Chronic systolic CHF (congestive heart failure) (HCC)     Hx of pulmonary embolus     Obesity, morbid ([address]     Vitreous hemorrhage of b

## 2021-07-14 NOTE — VASCULAR ACCESS
Consulted to place a PICC line. INR 4.05 outside parameter for bedside placement with VAT. Discussed with Carlos Ness RN. Will dc consult at this time.

## 2021-07-14 NOTE — CONSULTS
[facility]  Report of Inpatient Wound Care Consultation     [address] [facility] Patient Status:  Inpatient    1960 MRN [id number]   [facility] 4SW-A Attending Magdaleno Peralta MD   [facility] Day # 1 PCP DO IDANIA Heredia --   --   --   --    ETOH <3  --   --   --   --   --   --   --    PGLU  --    < >  --    < > 268*  --  199* 222*    < > = values in this interval not displayed.        Imaging:   See EMR  Microbiology:   See EMR    ASSESSMENT/ RECOMMENDATIONS:  Received errol [address] [place] [address] [address]  [phone]  Inpatient Wound Care Pager #[phone]

## 2021-07-14 NOTE — PROGRESS NOTES
[facility] Pharmacy Note:  Renal Adjustment for piperacillin/tazobactam (Estela Saucedo)    Lilly Aly is a 61year old patient who has been prescribed piperacillin/tazobactam (ZOSYN) 4500 mg every 12 hrs.   The estimated creatinine clearance is 46.5 mL/min (A) (ba

## 2021-07-14 NOTE — PLAN OF CARE
Received pt at change of shift sleeping. Pt very drowsy and hard to keep awake. Oriented to self and place, but not date. Denies pain. CBI infusing at fast rate. U/o is cherry colored and bloody. On levo and titrated to keep SBP>90/MAP>65.   Had 2 very

## 2021-07-14 NOTE — OCCUPATIONAL THERAPY NOTE
Orders for skilled OT services received, chart reviewed. Per RN, pt on bedrest due to being on high dose of Levo and therefore not medically appropriate for therapy at this time. OT to return as schedule allows.

## 2021-07-14 NOTE — PROGRESS NOTES
[facility]  Urology Progress Note    Smita Render Patient Status:  Inpatient    1960 MRN [id number]   [facility] 4SW-A Attending Althea Hdz MD   [facility] Day # 1 PCP Robin Meza DO     Subjective:  Bo Faulkner Such PCR     COVID test 7/13/21: negative      Xarelto on hold     CT marked enlargement with the massive right inguinal hernia which contains colon, small bowel, and a markedly thick wall bladder. Claretta Minus is a large amount of ascites within the hernia sac.  The

## 2021-07-14 NOTE — PROGRESS NOTES
[facility] Hospitalist Progress Note     PCP: Jasper Benedict DO    Chief Complaint: follow-up    Overnight/Interim Events:      SUBJECTIVE:  Having chills. Laying in bed.  On levo 11    OBJECTIVE:  Temp:  [96.8 °F (36 °C)-97.7 °F (36.5 °C)] 97.7 °F (36.5 °C) 215* 194*       Recent Labs   Lab 07/13/21  0646 07/13/21  1537   ALT 20 16   AST 31 27   ALB 1.7* 1.5*       Recent Labs   Lab 07/13/21  1150 07/13/21  1612 07/13/21  2034 07/14/21  0758 07/14/21  1140   PGLU 211* 216* 268* 199* 222*       Recent Labs   L LE wounds   - wound care  - pt reports prior fall as well.      Dispo: ICU     Questions/concerns were discussed with patient by bedside.      Total Time spent with patient and coordinating care:  36 minutes    Gayle Mills MD  [facility] Hospitalist  [phone]

## 2021-07-14 NOTE — PROGRESS NOTES
Critical Care Progress Note        NAME: Peggy Carey - ROOM: 164/266-I - MRN: [id number] - Age: 61year old - : 1960  Date of Admission: 2021  6:06 AM  Admission Diagnosis: Urinary retention [R33.9]  MUMTAZ (acute kidney injury) ([facility] [K07. regular rate and rhythm  Abdomen: +hernia appreciated  Extremities: edema 1+ in LE tamiko, also w/ wounds in various stages of healing on lower ext      Labs reviewed as noted below      ASSESSMENT/PLAN:  1.  Septic Shock  -due to urinary source  -wean pressor

## 2021-07-14 NOTE — PHYSICAL THERAPY NOTE
Attempted to see pt this AM. Per RN, pt not medically appropriate for therapy at this time due to high dose of Levo. PT will re-attempt to see pt at a later date.

## 2021-07-15 ENCOUNTER — APPOINTMENT (OUTPATIENT)
Dept: ULTRASOUND IMAGING | Facility: HOSPITAL | Age: 61
DRG: 853 | End: 2021-07-15
Attending: PHYSICIAN ASSISTANT
Payer: MEDICARE

## 2021-07-15 LAB
ALBUMIN SERPL-MCNC: 1.5 G/DL (ref 3.4–5)
ALBUMIN/GLOB SERPL: 0.4 {RATIO} (ref 1–2)
ALP LIVER SERPL-CCNC: 84 U/L
ALT SERPL-CCNC: 28 U/L
ANION GAP SERPL CALC-SCNC: 7 MMOL/L (ref 0–18)
AST SERPL-CCNC: 55 U/L (ref 15–37)
BASOPHILS # BLD AUTO: 0.05 X10(3) UL (ref 0–0.2)
BASOPHILS NFR BLD AUTO: 0.4 %
BILIRUB SERPL-MCNC: 0.7 MG/DL (ref 0.1–2)
BUN BLD-MCNC: 62 MG/DL (ref 7–18)
BUN/CREAT SERPL: 41.6 (ref 10–20)
CALCIUM BLD-MCNC: 7.6 MG/DL (ref 8.5–10.1)
CHLORIDE SERPL-SCNC: 110 MMOL/L (ref 98–112)
CO2 SERPL-SCNC: 23 MMOL/L (ref 21–32)
CREAT BLD-MCNC: 1.49 MG/DL
DEPRECATED RDW RBC AUTO: 53.5 FL (ref 35.1–46.3)
EOSINOPHIL # BLD AUTO: 0.1 X10(3) UL (ref 0–0.7)
EOSINOPHIL NFR BLD AUTO: 0.7 %
ERYTHROCYTE [DISTWIDTH] IN BLOOD BY AUTOMATED COUNT: 17.3 % (ref 11–15)
GLOBULIN PLAS-MCNC: 3.5 G/DL (ref 2.8–4.4)
GLUCOSE BLD-MCNC: 174 MG/DL (ref 70–99)
GLUCOSE BLD-MCNC: 238 MG/DL (ref 70–99)
GLUCOSE BLD-MCNC: 273 MG/DL (ref 70–99)
GLUCOSE BLD-MCNC: 285 MG/DL (ref 70–99)
GLUCOSE BLD-MCNC: 314 MG/DL (ref 70–99)
HAV IGM SER QL: 1.7 MG/DL (ref 1.6–2.6)
HCT VFR BLD AUTO: 25 %
HGB BLD-MCNC: 8.2 G/DL
IMM GRANULOCYTES # BLD AUTO: 0.16 X10(3) UL (ref 0–1)
IMM GRANULOCYTES NFR BLD: 1.2 %
LYMPHOCYTES # BLD AUTO: 0.82 X10(3) UL (ref 1–4)
LYMPHOCYTES NFR BLD AUTO: 5.9 %
M PROTEIN MFR SERPL ELPH: 5 G/DL (ref 6.4–8.2)
MCH RBC QN AUTO: 27.9 PG (ref 26–34)
MCHC RBC AUTO-ENTMCNC: 32.8 G/DL (ref 31–37)
MCV RBC AUTO: 85 FL
MONOCYTES # BLD AUTO: 1.52 X10(3) UL (ref 0.1–1)
MONOCYTES NFR BLD AUTO: 10.9 %
NEUTROPHILS # BLD AUTO: 11.25 X10 (3) UL (ref 1.5–7.7)
NEUTROPHILS # BLD AUTO: 11.25 X10(3) UL (ref 1.5–7.7)
NEUTROPHILS NFR BLD AUTO: 80.9 %
OSMOLALITY SERPL CALC.SUM OF ELEC: 320 MOSM/KG (ref 275–295)
PHOSPHATE SERPL-MCNC: 2.7 MG/DL (ref 2.5–4.9)
PLATELET # BLD AUTO: 358 10(3)UL (ref 150–450)
POTASSIUM SERPL-SCNC: 4 MMOL/L (ref 3.5–5.1)
RBC # BLD AUTO: 2.94 X10(6)UL
SODIUM SERPL-SCNC: 140 MMOL/L (ref 136–145)
WBC # BLD AUTO: 13.9 X10(3) UL (ref 4–11)

## 2021-07-15 PROCEDURE — 76770 US EXAM ABDO BACK WALL COMP: CPT | Performed by: PHYSICIAN ASSISTANT

## 2021-07-15 PROCEDURE — 83735 ASSAY OF MAGNESIUM: CPT | Performed by: NURSE PRACTITIONER

## 2021-07-15 PROCEDURE — 85025 COMPLETE CBC W/AUTO DIFF WBC: CPT | Performed by: NURSE PRACTITIONER

## 2021-07-15 PROCEDURE — 80053 COMPREHEN METABOLIC PANEL: CPT | Performed by: NURSE PRACTITIONER

## 2021-07-15 PROCEDURE — 84100 ASSAY OF PHOSPHORUS: CPT | Performed by: NURSE PRACTITIONER

## 2021-07-15 PROCEDURE — 82962 GLUCOSE BLOOD TEST: CPT

## 2021-07-15 RX ORDER — MAGNESIUM OXIDE 400 MG (241.3 MG MAGNESIUM) TABLET
400 TABLET ONCE
Status: COMPLETED | OUTPATIENT
Start: 2021-07-15 | End: 2021-07-15

## 2021-07-15 RX ORDER — FAMOTIDINE 20 MG/1
20 TABLET ORAL 2 TIMES DAILY
Status: DISCONTINUED | OUTPATIENT
Start: 2021-07-15 | End: 2021-07-27

## 2021-07-15 RX ORDER — MIDODRINE HYDROCHLORIDE 10 MG/1
10 TABLET ORAL 3 TIMES DAILY
Status: DISCONTINUED | OUTPATIENT
Start: 2021-07-15 | End: 2021-07-24

## 2021-07-15 NOTE — PROGRESS NOTES
[facility] Hospitalist Progress Note     PCP: Kat Ennis DO    Chief Complaint: follow-up    Overnight/Interim Events:      SUBJECTIVE:  Laying in bed, getting ultrasound. Feels ok, no f/c/LH.      OBJECTIVE:  Temp:  [97 °F (36.1 °C)-97.9 °F (36.6 °C)] 97 CO2 18.0* 19.0* 23.0 23.0   BUN 76* 69* 73* 62*   CREATSERUM 2.40* 2.04* 1.69* 1.49*   CA 8.7 7.9* 8.0* 7.6*   MG 1.9  --  1.8 1.7   PHOS  --   --  3.4 2.7   * 215* 194* 314*       Recent Labs   Lab 07/13/21  0646 07/13/21  1537 07/15/21  0438   A Acute kidney injury  - 2/2 above, IVF, follow, expect improvement now that has follow  - 2.40 to 2.04 to 1.69 to 1.49    # Type 2 diabetes mellitus  -Hyperglycemic protocol with accu-checks QID and low-dose sliding scale insulin. Will keep 1800 ADA diet.  Waldo Sibley

## 2021-07-15 NOTE — PLAN OF CARE
Pt a/o x4. Denies pain. Vasopressin and norepinephrine titrated off. Maintaining MD B/P parameters. NSR/ST. Tolerating RA. CBI stopped per urology MD. Approximately 2500mL red/pink tinged urine. Newsome remains patent. Minimal clots noted.  US kidney/bladder

## 2021-07-15 NOTE — OCCUPATIONAL THERAPY NOTE
Attempted to see patient today for OT eval, however patient continues to require Levo and w/ decreased alertness. Will hold for today and continue to follow.

## 2021-07-15 NOTE — PROGRESS NOTES
[facility]  Urology Progress Note    Peggy Carey Patient Status:  Inpatient    1960 MRN [id number]   [facility] 4SW-A Attending Jenny Farias MD   [facility] Day # 2 PCP Caleb Mariano DO     Subjective:  Aliza Dominguez Such test 7/13/21: negative      Xarelto on hold    Plan:    -Check renal/bladder US    -Continue present management with hanson catheter. CBI clamped. Monitor color/clarity of urine.   Nursing staff may hand irrigate hanson catheter prn.    -Check final culture

## 2021-07-15 NOTE — PROGRESS NOTES
Critical Care Progress Note        NAME: Guido Camacho - ROOM: [address] [place] [address] - MRN: [id number] - Age: 61year old - : 1960  Date of Admission: 2021  6:06 AM  Admission Diagnosis: Urinary retention [R33.9]  MUMTAZ (acute kidney injury) ([facility] [address] [D68. auscultation bilaterally  Heart: S1, S2 normal, no murmur, click, rub or gallop, regular rate and rhythm  Abdomen: +hernia appreciated  Extremities: edema 1+ in LE tamiko, also w/ wounds in various stages of healing on lower ext      Labs reviewed as noted be

## 2021-07-15 NOTE — PAYOR COMM NOTE
--------------  ADMISSION REVIEW     Payor: Wendy Goldberg 673 #:  [id number]  Authorization Number: [id number]    Admit date: 7/13/21  Admit time: 10:15 AM         Patient Seen in: [facility] Emergency Department      History   Patient presen Bladder/suprapubic). Palpations: Abdomen is soft. Tenderness: There is abdominal tenderness. Genitourinary:     Comments: Massive scrotal swelling presumably due to hernia, with inversion of penis.   Inguinal redness and erythema on the posterio NA, MG, ALB, TBIL, ALT, AST, ALP, CA, CREA, BUN, GLUC,    PROTHROMBIN TIME (PT) - Abnormal; Notable for the following components:    PT 45.6 (*)     INR 4.75 (*)     All other components within normal limits   MANUAL DIFFERENTIAL - Abnormal; Notable for th massive right-sided inguinal hernia which has     increased significantly in size and measures 29.5 x 16.9 x 20.6 cm in the     greatest craniocaudad, transverse, AP dimensions.   There is a moderate     amount of fluid within this right     inguinal hernia pyelonephritis. No definite bowel     obstruction. XR CHEST AP PORTABLE  (CPT=71045)   Final Result         =====    CONCLUSION:    Stable cardiac enlargement at least moderate in degree. Stable mild vascular congestion without acute CHF.   No p troponin is negative thus far. He denies any chest pain. [place] He has significant leukocytosis at around 30,800 urinalysis suggestive of infection. Chest x-ray does not show any acute process appears stable.   CT scan of the abdomen pelvis performed see rep but answering questions, obese   Head:  Normocephalic, without obvious abnormality, atraumatic. Eyes:  Sclera anicteric, EOMs intact.     Nose: Nares normal,  Mucosa normal    Throat: Lips normal   Neck: Supple, symmetrical, trachea midline   Lungs:   Thomas above   - apprec  recs  - hanson, 2L removed, CBI  - possible ureteral stent placement vs nephrostomy tube placement pending clinical course  - scortal elevation      # Acute kidney injury  - 2/2 above, IVF, follow, expect improvement now that has follow Date Action Dose Route User    7/15/2021 1114 Given 10 mg Oral Cristian Garcia RN      norepinephrine (LEVOPHED) 4 mg/250 ml premix infusion     Date Action Dose Route User    7/15/2021 1028 Rate/Dose Change 0.5 mcg/min Intravenous Cristian Garcia RN    7/ Sarika Clements RN    7/15/2021 1217 Rate/Dose Change 0.02 Units/min Intravenous Jessi Melchor RN    7/15/2021 1115 Rate/Dose Change 0.03 Units/min Intravenous Jessi Melchor RN    7/15/2021 8452 New Bag 0.04 Units/min Intravenous Minerva Crabtree RN    7/14

## 2021-07-15 NOTE — PLAN OF CARE
Received pt alert and oriented with visitors at bedside. Pt is forgetful at times. Asking lots of questions. Reinforced teaching as able. On room air. Afeb. On lose dose levo and vaso gtts to keep SBP>90/MAP>65.  LE's edematous.   Bilat leg wounds wit

## 2021-07-16 LAB
ALBUMIN SERPL-MCNC: 1.5 G/DL (ref 3.4–5)
ALBUMIN/GLOB SERPL: 0.4 {RATIO} (ref 1–2)
ALP LIVER SERPL-CCNC: 85 U/L
ALT SERPL-CCNC: 27 U/L
ANION GAP SERPL CALC-SCNC: 7 MMOL/L (ref 0–18)
AST SERPL-CCNC: 43 U/L (ref 15–37)
BASOPHILS # BLD AUTO: 0.07 X10(3) UL (ref 0–0.2)
BASOPHILS NFR BLD AUTO: 0.5 %
BILIRUB SERPL-MCNC: 0.7 MG/DL (ref 0.1–2)
BUN BLD-MCNC: 45 MG/DL (ref 7–18)
BUN/CREAT SERPL: 40.2 (ref 10–20)
CALCIUM BLD-MCNC: 7.9 MG/DL (ref 8.5–10.1)
CHLORIDE SERPL-SCNC: 114 MMOL/L (ref 98–112)
CO2 SERPL-SCNC: 24 MMOL/L (ref 21–32)
CREAT BLD-MCNC: 1.12 MG/DL
DEPRECATED RDW RBC AUTO: 55.5 FL (ref 35.1–46.3)
EOSINOPHIL # BLD AUTO: 0.15 X10(3) UL (ref 0–0.7)
EOSINOPHIL NFR BLD AUTO: 1.1 %
ERYTHROCYTE [DISTWIDTH] IN BLOOD BY AUTOMATED COUNT: 17.7 % (ref 11–15)
GLOBULIN PLAS-MCNC: 3.8 G/DL (ref 2.8–4.4)
GLUCOSE BLD-MCNC: 163 MG/DL (ref 70–99)
GLUCOSE BLD-MCNC: 163 MG/DL (ref 70–99)
GLUCOSE BLD-MCNC: 177 MG/DL (ref 70–99)
GLUCOSE BLD-MCNC: 245 MG/DL (ref 70–99)
GLUCOSE BLD-MCNC: 259 MG/DL (ref 70–99)
HAV IGM SER QL: 1.6 MG/DL (ref 1.6–2.6)
HCT VFR BLD AUTO: 24.8 %
HGB BLD-MCNC: 7.8 G/DL
IMM GRANULOCYTES # BLD AUTO: 0.22 X10(3) UL (ref 0–1)
IMM GRANULOCYTES NFR BLD: 1.7 %
LYMPHOCYTES # BLD AUTO: 0.99 X10(3) UL (ref 1–4)
LYMPHOCYTES NFR BLD AUTO: 7.4 %
M PROTEIN MFR SERPL ELPH: 5.3 G/DL (ref 6.4–8.2)
MCH RBC QN AUTO: 26.9 PG (ref 26–34)
MCHC RBC AUTO-ENTMCNC: 31.5 G/DL (ref 31–37)
MCV RBC AUTO: 85.5 FL
MONOCYTES # BLD AUTO: 1.5 X10(3) UL (ref 0.1–1)
MONOCYTES NFR BLD AUTO: 11.3 %
NEUTROPHILS # BLD AUTO: 10.4 X10 (3) UL (ref 1.5–7.7)
NEUTROPHILS # BLD AUTO: 10.4 X10(3) UL (ref 1.5–7.7)
NEUTROPHILS NFR BLD AUTO: 78 %
OSMOLALITY SERPL CALC.SUM OF ELEC: 315 MOSM/KG (ref 275–295)
PHOSPHATE SERPL-MCNC: 2.1 MG/DL (ref 2.5–4.9)
PLATELET # BLD AUTO: 350 10(3)UL (ref 150–450)
POTASSIUM SERPL-SCNC: 3.7 MMOL/L (ref 3.5–5.1)
RBC # BLD AUTO: 2.9 X10(6)UL
SODIUM SERPL-SCNC: 145 MMOL/L (ref 136–145)
STREPTOCOCCUS DNA BLD POS NAA+NON-PROBE: DETECTED
WBC # BLD AUTO: 13.3 X10(3) UL (ref 4–11)

## 2021-07-16 PROCEDURE — 84100 ASSAY OF PHOSPHORUS: CPT | Performed by: NURSE PRACTITIONER

## 2021-07-16 PROCEDURE — 97530 THERAPEUTIC ACTIVITIES: CPT

## 2021-07-16 PROCEDURE — 82962 GLUCOSE BLOOD TEST: CPT

## 2021-07-16 PROCEDURE — 97162 PT EVAL MOD COMPLEX 30 MIN: CPT

## 2021-07-16 PROCEDURE — 85025 COMPLETE CBC W/AUTO DIFF WBC: CPT | Performed by: NURSE PRACTITIONER

## 2021-07-16 PROCEDURE — 97535 SELF CARE MNGMENT TRAINING: CPT

## 2021-07-16 PROCEDURE — 83735 ASSAY OF MAGNESIUM: CPT | Performed by: NURSE PRACTITIONER

## 2021-07-16 PROCEDURE — 80053 COMPREHEN METABOLIC PANEL: CPT | Performed by: NURSE PRACTITIONER

## 2021-07-16 PROCEDURE — 97167 OT EVAL HIGH COMPLEX 60 MIN: CPT

## 2021-07-16 RX ORDER — MAGNESIUM SULFATE HEPTAHYDRATE 40 MG/ML
2 INJECTION, SOLUTION INTRAVENOUS ONCE
Status: COMPLETED | OUTPATIENT
Start: 2021-07-16 | End: 2021-07-17

## 2021-07-16 NOTE — PROGRESS NOTES
Critical Care Progress Note        NAME: Maddy Esteban - ROOM: 215/159-Y - MRN: [id number] - Age: 61year old - : 1960  Date of Admission: 2021  6:06 AM  Admission Diagnosis: Urinary retention [R33.9]  MUMTAZ (acute kidney injury) ([facility] [address] [A52. auscultation bilaterally  Heart: S1, S2 normal, no murmur, click, rub or gallop, regular rate and rhythm  Abdomen: +hernia appreciated  Extremities: edema 1+ in LE tamiko, also w/ wounds in various stages of healing on lower ext      Labs reviewed as noted be

## 2021-07-16 NOTE — PROGRESS NOTES
[facility] Hospitalist Progress Note     PCP: Chata Valenzuela DO    Chief Complaint: follow-up    Overnight/Interim Events:      SUBJECTIVE:  96/59. No LH/dizziness/f/c. Working c PT.      OBJECTIVE:  Temp:  [97.1 °F (36.2 °C)-97.4 °F (36.3 °C)] 97.4 °F (36.3 140 145   K 4.1 3.8 3.3* 4.0 3.7    105 108 110 114*   CO2 18.0* 19.0* 23.0 23.0 24.0   BUN 76* 69* 73* 62* 45*   CREATSERUM 2.40* 2.04* 1.69* 1.49* 1.12   CA 8.7 7.9* 8.0* 7.6* 7.9*   MG 1.9  --  1.8 1.7 1.6   PHOS  --   --  3.4 2.7 2.1*   * placement vs nephrostomy tube placement pending clinical course  - scortal elevation  - ultrasound c mod R hydro and mild to mod L hydro      # Acute kidney injury  - 2/2 above, IVF, follow, expect improvement now that has follow  - 2.40 to 2.04 to 1.69 to

## 2021-07-16 NOTE — PLAN OF CARE
Received pt A&Ox4 on room air. Denies pain. 0.9 infusing per order. Newsome intact. CBI restarted d/t increased redness and clots. Electrolytes replaced per protocol. Afebrile. VSS. NSR/ST on monitor. See flowsheets. See MAR. Will continue to monitor.

## 2021-07-16 NOTE — OCCUPATIONAL THERAPY NOTE
OCCUPATIONAL THERAPY EVALUATION - INPATIENT     Room Number: 473/473-A  Evaluation Date: 7/16/2021  Type of Evaluation: Initial  Presenting Problem: septic shock, MUMTAZ, Cellulitis of left foot, urinary retention, UTI w/ hematuria    Physician Order: IP Cons complete cooking, cleaning, laundry without assist but does need assist for driving d/t low vision d/t cataracts. Patient reports his ex-wife can stay with him but they do have two different apartments.   Patient also states his son is able to drive him to known    Behavioral/Emotional/Social: pleasant, cooperative, easily distracted    RANGE OF MOTION AND STRENGTH ASSESSMENT  Upper extremity ROM is within functional limits except for the following:  Right Shoulder flexion 3/4 active  Left Shoulder flexion 3 Patient noted to have watermelon-sized scrotum w/ retracted penis. Patient agreeable to adding modified support to assist with moving in bed while supporting scrotum.   Brief was used around the scrotum to protect skin and a sheet was used as a hammock un Activity Short Form was completed and  this patient  is demonstrating a  53% degree impairment in activities of daily living. Research supports that patients with this level of impairment often benefit from JOANNA. Maral Bronson  In this OT evaluation patient presents w 7    ADL Goals   Patient will perform lower body dressing:  with mod assist and with adaptive equipment PRN  Patient will perform toileting: with mod assist    Functional Transfer Goals  Patient will transfer from supine to sit:  with min assist  Patient w

## 2021-07-16 NOTE — PHYSICAL THERAPY NOTE
PHYSICAL THERAPY EVALUATION - INPATIENT     Room Number: 473/473-A  Evaluation Date: 7/16/2021  Type of Evaluation: Initial  Physician Order: PT Eval and Treat    Presenting Problem: sepsis, UTI  Reason for Therapy: Mobility Dysfunction and Discharge surface  Management Techniques:  Activity promotion;Repositioning    COGNITION  · Orientation Level:  oriented x4  · Following Commands:  follows one step commands with increased time and follows one step commands with repetition  · Initiation: cues to init Rolling walker  Pattern: Shuffle (wide MICHAEL)          Skilled Therapy Provided: Pt received supine in bed and is agreeable to therapy. Pt reports difficulty with bed mobility due to scrotal edema.  OT present and assisted with placement of brief/sheet as scr impairments strength deficits, limited endurance, balance impairments, decreased activity tolerance, poor safety awareness, and decreased insight into impairments. Functional outcome measures completed include AMPAC.  Based on this evaluation, patient's cli

## 2021-07-16 NOTE — PLAN OF CARE
Assumed care of pt this am following RN report. PT awake, oriented x 4. Pt tolerating room air. NSR per monitor. VSS. Tolerating ADA diet. Newsome red with clots this am, CBI adjusted as needed. Tylenol given for c/o neck pain this am, with relief.  BLE dress

## 2021-07-16 NOTE — PROGRESS NOTES
[facility]  Urology Progress Note    Guido Camacho Patient Status:  Inpatient    1960 MRN [id number]   [facility] 4SW-A Attending Dyana Clements MD   [facility] Day # 3 PCP Yoly Handy DO     Subjective:  Jordi Govea Such test 7/13/21: negative      Xarelto on hold    Renal/bladder US 7/15/21: Moderate right hydronephrosis and mild to moderate left hydronephrosis is likely stable from recent prior CT accounting for differences in technique.  There is a Newsome catheter within

## 2021-07-17 LAB
ALBUMIN SERPL-MCNC: 1.5 G/DL (ref 3.4–5)
ALBUMIN/GLOB SERPL: 0.4 {RATIO} (ref 1–2)
ALP LIVER SERPL-CCNC: 83 U/L
ALT SERPL-CCNC: 26 U/L
ANION GAP SERPL CALC-SCNC: 4 MMOL/L (ref 0–18)
AST SERPL-CCNC: 39 U/L (ref 15–37)
BASOPHILS # BLD AUTO: 0.14 X10(3) UL (ref 0–0.2)
BASOPHILS NFR BLD AUTO: 1.1 %
BILIRUB SERPL-MCNC: 0.6 MG/DL (ref 0.1–2)
BUN BLD-MCNC: 34 MG/DL (ref 7–18)
BUN/CREAT SERPL: 34 (ref 10–20)
CALCIUM BLD-MCNC: 7.6 MG/DL (ref 8.5–10.1)
CHLORIDE SERPL-SCNC: 115 MMOL/L (ref 98–112)
CO2 SERPL-SCNC: 25 MMOL/L (ref 21–32)
CREAT BLD-MCNC: 1 MG/DL
DEPRECATED RDW RBC AUTO: 57.8 FL (ref 35.1–46.3)
EOSINOPHIL # BLD AUTO: 0.28 X10(3) UL (ref 0–0.7)
EOSINOPHIL NFR BLD AUTO: 2.3 %
ERYTHROCYTE [DISTWIDTH] IN BLOOD BY AUTOMATED COUNT: 17.8 % (ref 11–15)
GLOBULIN PLAS-MCNC: 3.8 G/DL (ref 2.8–4.4)
GLUCOSE BLD-MCNC: 125 MG/DL (ref 70–99)
GLUCOSE BLD-MCNC: 128 MG/DL (ref 70–99)
GLUCOSE BLD-MCNC: 168 MG/DL (ref 70–99)
GLUCOSE BLD-MCNC: 179 MG/DL (ref 70–99)
GLUCOSE BLD-MCNC: 216 MG/DL (ref 70–99)
HAV IGM SER QL: 1.7 MG/DL (ref 1.6–2.6)
HCT VFR BLD AUTO: 25.1 %
HGB BLD-MCNC: 7.6 G/DL
IMM GRANULOCYTES # BLD AUTO: 0.29 X10(3) UL (ref 0–1)
IMM GRANULOCYTES NFR BLD: 2.4 %
LYMPHOCYTES # BLD AUTO: 1.06 X10(3) UL (ref 1–4)
LYMPHOCYTES NFR BLD AUTO: 8.7 %
M PROTEIN MFR SERPL ELPH: 5.3 G/DL (ref 6.4–8.2)
MCH RBC QN AUTO: 27 PG (ref 26–34)
MCHC RBC AUTO-ENTMCNC: 30.3 G/DL (ref 31–37)
MCV RBC AUTO: 89.3 FL
MONOCYTES # BLD AUTO: 1.55 X10(3) UL (ref 0.1–1)
MONOCYTES NFR BLD AUTO: 12.7 %
NEUTROPHILS # BLD AUTO: 8.86 X10 (3) UL (ref 1.5–7.7)
NEUTROPHILS # BLD AUTO: 8.86 X10(3) UL (ref 1.5–7.7)
NEUTROPHILS NFR BLD AUTO: 72.8 %
OSMOLALITY SERPL CALC.SUM OF ELEC: 307 MOSM/KG (ref 275–295)
PHOSPHATE SERPL-MCNC: 2.4 MG/DL (ref 2.5–4.9)
PLATELET # BLD AUTO: 362 10(3)UL (ref 150–450)
POTASSIUM SERPL-SCNC: 3.6 MMOL/L (ref 3.5–5.1)
RBC # BLD AUTO: 2.81 X10(6)UL
SODIUM SERPL-SCNC: 144 MMOL/L (ref 136–145)
WBC # BLD AUTO: 12.2 X10(3) UL (ref 4–11)

## 2021-07-17 PROCEDURE — 83735 ASSAY OF MAGNESIUM: CPT | Performed by: NURSE PRACTITIONER

## 2021-07-17 PROCEDURE — 82962 GLUCOSE BLOOD TEST: CPT

## 2021-07-17 PROCEDURE — 85025 COMPLETE CBC W/AUTO DIFF WBC: CPT | Performed by: NURSE PRACTITIONER

## 2021-07-17 PROCEDURE — 84100 ASSAY OF PHOSPHORUS: CPT | Performed by: NURSE PRACTITIONER

## 2021-07-17 PROCEDURE — 80053 COMPREHEN METABOLIC PANEL: CPT | Performed by: NURSE PRACTITIONER

## 2021-07-17 RX ORDER — MAGNESIUM SULFATE HEPTAHYDRATE 40 MG/ML
2 INJECTION, SOLUTION INTRAVENOUS ONCE
Status: COMPLETED | OUTPATIENT
Start: 2021-07-17 | End: 2021-07-17

## 2021-07-17 RX ORDER — MAGNESIUM OXIDE 400 MG (241.3 MG MAGNESIUM) TABLET
400 TABLET ONCE
Status: COMPLETED | OUTPATIENT
Start: 2021-07-17 | End: 2021-07-17

## 2021-07-17 NOTE — PROGRESS NOTES
[facility] Hospitalist Progress Note     PCP: Melissa Crimes, DO    Chief Complaint: follow-up    Overnight/Interim Events:      SUBJECTIVE:  Laying in bed, eating lunch. CBI off. 109/62, 106. strength better.      OBJECTIVE:  Temp:  [97.2 °F (36.2 °C)-98 °F ( 07/13/21  1537 07/14/21  0443 07/15/21  0438 07/16/21  0525 07/17/21  0441   *   < > 134* 137 140 145 144   K 4.1   < > 3.8 3.3* 4.0 3.7 3.6      < > 105 108 110 114* 115*   CO2 18.0*   < > 19.0* 23.0 23.0 24.0 25.0   BUN 76*   < > 69* 73* 62* moderate bilateral hydronephrosis  # Obstructive uropathy 2/2 above   - apprec  recs  - hanson, s/p CBI  - possible ureteral stent placement vs nephrostomy tube placement pending clinical course  - scortal elevation  - ultrasound c mod R hydro and mild to

## 2021-07-17 NOTE — PLAN OF CARE
Pt a/o x4. Denies pain. NSR/ST observed to bedside monitor. VSS. Tolerating RA. Good appetite w/ADA diet. CBI remains off. Adequate pink tinged urine output w/minimal, small clots. Dressing changes to BLE per orders. Active transfer orders in place.  Michael Ala

## 2021-07-17 NOTE — PROGRESS NOTES
[facility]  Urology Progress Note    Naima Pinedaer Patient Status:  Inpatient    1960 MRN [id number]   [facility] 4SW-A Attending Janet Hoskins MD   [facility] Day # 4 PCP Dima Preciado DO     Subjective:  Edgardo Crow Such right hydronephrosis and mild to moderate left hydronephrosis is likely stable from recent prior CT accounting for differences in technique.  There is a Newsome catheter within the bladder which is decompressed, limiting evaluation.      Plan:    -Bladder is

## 2021-07-17 NOTE — PLAN OF CARE
Received pt A&Ox4 on RA. 0.9 infusing per order. C/o neck pain; PRN Tylenol given per pt request. Newsome intact, monitoring urine output, color and clarity. Upon initial assessment urine red with red flecks/clots noted; CBI adjusted as needed.  CBI stopped t

## 2021-07-18 LAB
ALBUMIN SERPL-MCNC: 1.5 G/DL (ref 3.4–5)
ALBUMIN/GLOB SERPL: 0.4 {RATIO} (ref 1–2)
ALP LIVER SERPL-CCNC: 82 U/L
ALT SERPL-CCNC: 24 U/L
ANION GAP SERPL CALC-SCNC: 5 MMOL/L (ref 0–18)
AST SERPL-CCNC: 29 U/L (ref 15–37)
BASOPHILS # BLD AUTO: 0.09 X10(3) UL (ref 0–0.2)
BASOPHILS NFR BLD AUTO: 0.7 %
BILIRUB SERPL-MCNC: 0.6 MG/DL (ref 0.1–2)
BUN BLD-MCNC: 30 MG/DL (ref 7–18)
BUN/CREAT SERPL: 37 (ref 10–20)
CALCIUM BLD-MCNC: 8.1 MG/DL (ref 8.5–10.1)
CHLORIDE SERPL-SCNC: 114 MMOL/L (ref 98–112)
CO2 SERPL-SCNC: 23 MMOL/L (ref 21–32)
CREAT BLD-MCNC: 0.81 MG/DL
DEPRECATED RDW RBC AUTO: 57.1 FL (ref 35.1–46.3)
EOSINOPHIL # BLD AUTO: 0.28 X10(3) UL (ref 0–0.7)
EOSINOPHIL NFR BLD AUTO: 2.2 %
ERYTHROCYTE [DISTWIDTH] IN BLOOD BY AUTOMATED COUNT: 18.1 % (ref 11–15)
GLOBULIN PLAS-MCNC: 4.2 G/DL (ref 2.8–4.4)
GLUCOSE BLD-MCNC: 124 MG/DL (ref 70–99)
GLUCOSE BLD-MCNC: 130 MG/DL (ref 70–99)
GLUCOSE BLD-MCNC: 169 MG/DL (ref 70–99)
GLUCOSE BLD-MCNC: 170 MG/DL (ref 70–99)
GLUCOSE BLD-MCNC: 193 MG/DL (ref 70–99)
HAV IGM SER QL: 1.9 MG/DL (ref 1.6–2.6)
HCT VFR BLD AUTO: 25.2 %
HGB BLD-MCNC: 7.6 G/DL
IMM GRANULOCYTES # BLD AUTO: 0.49 X10(3) UL (ref 0–1)
IMM GRANULOCYTES NFR BLD: 3.9 %
LYMPHOCYTES # BLD AUTO: 1.14 X10(3) UL (ref 1–4)
LYMPHOCYTES NFR BLD AUTO: 9.2 %
M PROTEIN MFR SERPL ELPH: 5.7 G/DL (ref 6.4–8.2)
MCH RBC QN AUTO: 26.6 PG (ref 26–34)
MCHC RBC AUTO-ENTMCNC: 30.2 G/DL (ref 31–37)
MCV RBC AUTO: 88.1 FL
MONOCYTES # BLD AUTO: 1.53 X10(3) UL (ref 0.1–1)
MONOCYTES NFR BLD AUTO: 12.3 %
NEUTROPHILS # BLD AUTO: 8.92 X10 (3) UL (ref 1.5–7.7)
NEUTROPHILS # BLD AUTO: 8.92 X10(3) UL (ref 1.5–7.7)
NEUTROPHILS NFR BLD AUTO: 71.7 %
OSMOLALITY SERPL CALC.SUM OF ELEC: 302 MOSM/KG (ref 275–295)
PHOSPHATE SERPL-MCNC: 2.5 MG/DL (ref 2.5–4.9)
PLATELET # BLD AUTO: 355 10(3)UL (ref 150–450)
POTASSIUM SERPL-SCNC: 4 MMOL/L (ref 3.5–5.1)
RBC # BLD AUTO: 2.86 X10(6)UL
SODIUM SERPL-SCNC: 142 MMOL/L (ref 136–145)
WBC # BLD AUTO: 12.5 X10(3) UL (ref 4–11)

## 2021-07-18 PROCEDURE — 83735 ASSAY OF MAGNESIUM: CPT | Performed by: NURSE PRACTITIONER

## 2021-07-18 PROCEDURE — 82962 GLUCOSE BLOOD TEST: CPT

## 2021-07-18 PROCEDURE — 80053 COMPREHEN METABOLIC PANEL: CPT | Performed by: NURSE PRACTITIONER

## 2021-07-18 PROCEDURE — 85025 COMPLETE CBC W/AUTO DIFF WBC: CPT | Performed by: NURSE PRACTITIONER

## 2021-07-18 PROCEDURE — 84100 ASSAY OF PHOSPHORUS: CPT | Performed by: NURSE PRACTITIONER

## 2021-07-18 NOTE — PLAN OF CARE
Received pt A&Ox4 on RA. Newsome intact, draining pink/red urine, no clots noted. CBI off. BLE dressings intact. Afebrile. VSS. NSR/ST on monitor. See flowsheets. See MAR. Has transfer orders. Will continue to monitor.

## 2021-07-18 NOTE — PROGRESS NOTES
Critical Care Progress Note        NAME: Jose Garcia - ROOM: 716/850-B - MRN: [id number] - Age: 64year old - : 1960  Date of Admission: 2021  6:06 AM  Admission Diagnosis: Urinary retention [R33.9]  MUMTAZ (acute kidney injury) ([facility] [address] [M59. shock     Lungs: clear to auscultation bilaterally  Heart: S1, S2 normal, no murmur, click, rub or gallop, regular rate and rhythm  Abdomen: +hernia appreciated  Extremities: edema 1+ in LE tamiko, also w/ wounds in various stages of healing on lower ext

## 2021-07-18 NOTE — PLAN OF CARE
Pt a/o x4, forgetful. Denies pain. VSS. Tolerating RA. Adequate urine output to hanson catheter. Excellent appetite w/ADA diet. Accucheck ac/hs w/sliding scale coverage per orders. Active transfer orders in place. Report given to receiving RN.  Plan of care

## 2021-07-18 NOTE — PROGRESS NOTES
Patient received from 473 to 324. VSS afebrile. Denies nausea or emesis. Denies pain at this time. Note   CBI clamped and 3-way Newsome intact draining red bloody urine.   CBI flush given and note drainage cleared to clear light pink with some red sedimen

## 2021-07-18 NOTE — PROGRESS NOTES
[facility] Hospitalist Progress Note     PCP: Joanne Mike DO    Chief Complaint: follow-up    Overnight/Interim Events:      SUBJECTIVE:  Laying in bed, 110/71. Doing better.      OBJECTIVE:  Temp:  [97 °F (36.1 °C)-97.9 °F (36.6 °C)] 97.9 °F (36.6 °C)  Pu < > = values in this interval not displayed.        Recent Labs   Lab 07/14/21  0443 07/15/21  0438 07/16/21  0525 07/17/21  0441 07/18/21  0510    140 145 144 142   K 3.3* 4.0 3.7 3.6 4.0    110 114* 115* 114*   CO2 23.0 23.0 24.0 25.0 23.0   B hanson, s/p CBI  - possible ureteral stent placement vs nephrostomy tube placement pending clinical course  - scortal elevation  - ultrasound c mod R hydro and mild to mod L hydro      # Acute kidney injury  - 2/2 above, IVF, follow, expect improvement now

## 2021-07-18 NOTE — PROGRESS NOTES
[facility]  Urology Progress Note    Guido Camacho Patient Status:  Inpatient    1960 MRN [id number]   [facility] 4SW-A Attending Dyana Clements MD   [address] Day # 5 PCP Yoly Handy DO     Subjective:  Jocelyn Andre hydronephrosis and mild to moderate left hydronephrosis is likely stable from recent prior CT accounting for differences in technique.  There is a Newsome catheter within the bladder which is decompressed, limiting evaluation.      Impression/plan:    Sheldon Irene

## 2021-07-19 ENCOUNTER — ANESTHESIA (OUTPATIENT)
Dept: SURGERY | Facility: HOSPITAL | Age: 61
DRG: 853 | End: 2021-07-19
Payer: MEDICARE

## 2021-07-19 ENCOUNTER — APPOINTMENT (OUTPATIENT)
Dept: GENERAL RADIOLOGY | Facility: HOSPITAL | Age: 61
DRG: 853 | End: 2021-07-19
Attending: UROLOGY
Payer: MEDICARE

## 2021-07-19 ENCOUNTER — APPOINTMENT (OUTPATIENT)
Dept: ULTRASOUND IMAGING | Facility: HOSPITAL | Age: 61
DRG: 853 | End: 2021-07-19
Attending: UROLOGY
Payer: MEDICARE

## 2021-07-19 ENCOUNTER — ANESTHESIA EVENT (OUTPATIENT)
Dept: SURGERY | Facility: HOSPITAL | Age: 61
DRG: 853 | End: 2021-07-19
Payer: MEDICARE

## 2021-07-19 LAB
ALBUMIN SERPL-MCNC: 1.5 G/DL (ref 3.4–5)
ALBUMIN/GLOB SERPL: 0.4 {RATIO} (ref 1–2)
ALP LIVER SERPL-CCNC: 82 U/L
ALT SERPL-CCNC: 22 U/L
ANION GAP SERPL CALC-SCNC: 6 MMOL/L (ref 0–18)
AST SERPL-CCNC: 32 U/L (ref 15–37)
BASOPHILS # BLD: 0.11 X10(3) UL (ref 0–0.2)
BASOPHILS NFR BLD: 1 %
BILIRUB SERPL-MCNC: 0.5 MG/DL (ref 0.1–2)
BUN BLD-MCNC: 29 MG/DL (ref 7–18)
BUN/CREAT SERPL: 27.6 (ref 10–20)
CALCIUM BLD-MCNC: 8 MG/DL (ref 8.5–10.1)
CHLORIDE SERPL-SCNC: 113 MMOL/L (ref 98–112)
CO2 SERPL-SCNC: 23 MMOL/L (ref 21–32)
CREAT BLD-MCNC: 1.05 MG/DL
DEPRECATED RDW RBC AUTO: 57 FL (ref 35.1–46.3)
EOSINOPHIL # BLD: 0.23 X10(3) UL (ref 0–0.7)
EOSINOPHIL NFR BLD: 2 %
ERYTHROCYTE [DISTWIDTH] IN BLOOD BY AUTOMATED COUNT: 17.8 % (ref 11–15)
GLOBULIN PLAS-MCNC: 4.2 G/DL (ref 2.8–4.4)
GLUCOSE BLD-MCNC: 103 MG/DL (ref 70–99)
GLUCOSE BLD-MCNC: 121 MG/DL (ref 70–99)
GLUCOSE BLD-MCNC: 154 MG/DL (ref 70–99)
GLUCOSE BLD-MCNC: 179 MG/DL (ref 70–99)
HAV IGM SER QL: 1.9 MG/DL (ref 1.6–2.6)
HCT VFR BLD AUTO: 24.2 %
HCT VFR BLD AUTO: 25.3 %
HGB BLD-MCNC: 7.5 G/DL
HGB BLD-MCNC: 7.8 G/DL
INR BLD: 1.18 (ref 0.89–1.11)
LYMPHOCYTES NFR BLD: 0.91 X10(3) UL (ref 1–4)
LYMPHOCYTES NFR BLD: 8 %
M PROTEIN MFR SERPL ELPH: 5.7 G/DL (ref 6.4–8.2)
MCH RBC QN AUTO: 27.2 PG (ref 26–34)
MCHC RBC AUTO-ENTMCNC: 31 G/DL (ref 31–37)
MCV RBC AUTO: 87.7 FL
MONOCYTES # BLD: 0.46 X10(3) UL (ref 0.1–1)
MONOCYTES NFR BLD: 4 %
MORPHOLOGY: NORMAL
NEUTROPHILS # BLD AUTO: 8.09 X10 (3) UL (ref 1.5–7.7)
NEUTROPHILS NFR BLD: 83 %
NEUTS BAND NFR BLD: 2 %
NEUTS HYPERSEG # BLD: 9.69 X10(3) UL (ref 1.5–7.7)
OSMOLALITY SERPL CALC.SUM OF ELEC: 304 MOSM/KG (ref 275–295)
PHOSPHATE SERPL-MCNC: 2.8 MG/DL (ref 2.5–4.9)
PLATELET # BLD AUTO: 360 10(3)UL (ref 150–450)
PLATELET MORPHOLOGY: NORMAL
POTASSIUM SERPL-SCNC: 3.9 MMOL/L (ref 3.5–5.1)
PSA SERPL DL<=0.01 NG/ML-MCNC: 15.4 SECONDS (ref 12.4–14.6)
RBC # BLD AUTO: 2.76 X10(6)UL
SODIUM SERPL-SCNC: 142 MMOL/L (ref 136–145)
TOTAL CELLS COUNTED: 100
WBC # BLD AUTO: 11.4 X10(3) UL (ref 4–11)

## 2021-07-19 PROCEDURE — 85025 COMPLETE CBC W/AUTO DIFF WBC: CPT | Performed by: NURSE PRACTITIONER

## 2021-07-19 PROCEDURE — 85027 COMPLETE CBC AUTOMATED: CPT | Performed by: NURSE PRACTITIONER

## 2021-07-19 PROCEDURE — 82962 GLUCOSE BLOOD TEST: CPT

## 2021-07-19 PROCEDURE — 85018 HEMOGLOBIN: CPT | Performed by: UROLOGY

## 2021-07-19 PROCEDURE — 85610 PROTHROMBIN TIME: CPT | Performed by: PHYSICIAN ASSISTANT

## 2021-07-19 PROCEDURE — 83735 ASSAY OF MAGNESIUM: CPT | Performed by: NURSE PRACTITIONER

## 2021-07-19 PROCEDURE — 84100 ASSAY OF PHOSPHORUS: CPT | Performed by: NURSE PRACTITIONER

## 2021-07-19 PROCEDURE — 85007 BL SMEAR W/DIFF WBC COUNT: CPT | Performed by: NURSE PRACTITIONER

## 2021-07-19 PROCEDURE — BT1B1ZZ FLUOROSCOPY OF BLADDER AND URETHRA USING LOW OSMOLAR CONTRAST: ICD-10-PCS | Performed by: UROLOGY

## 2021-07-19 PROCEDURE — 0TCB8ZZ EXTIRPATION OF MATTER FROM BLADDER, VIA NATURAL OR ARTIFICIAL OPENING ENDOSCOPIC: ICD-10-PCS | Performed by: UROLOGY

## 2021-07-19 PROCEDURE — 85014 HEMATOCRIT: CPT | Performed by: UROLOGY

## 2021-07-19 PROCEDURE — 80053 COMPREHEN METABOLIC PANEL: CPT | Performed by: NURSE PRACTITIONER

## 2021-07-19 PROCEDURE — 0T9B70Z DRAINAGE OF BLADDER WITH DRAINAGE DEVICE, VIA NATURAL OR ARTIFICIAL OPENING: ICD-10-PCS | Performed by: UROLOGY

## 2021-07-19 PROCEDURE — 76857 US EXAM PELVIC LIMITED: CPT | Performed by: UROLOGY

## 2021-07-19 RX ORDER — ACETAMINOPHEN 325 MG/1
650 TABLET ORAL ONCE
Status: DISCONTINUED | OUTPATIENT
Start: 2021-07-19 | End: 2021-07-20 | Stop reason: HOSPADM

## 2021-07-19 RX ORDER — NALOXONE HYDROCHLORIDE 0.4 MG/ML
80 INJECTION, SOLUTION INTRAMUSCULAR; INTRAVENOUS; SUBCUTANEOUS AS NEEDED
Status: DISCONTINUED | OUTPATIENT
Start: 2021-07-19 | End: 2021-07-20 | Stop reason: HOSPADM

## 2021-07-19 RX ORDER — ONDANSETRON 2 MG/ML
4 INJECTION INTRAMUSCULAR; INTRAVENOUS AS NEEDED
Status: DISCONTINUED | OUTPATIENT
Start: 2021-07-19 | End: 2021-07-20 | Stop reason: HOSPADM

## 2021-07-19 RX ORDER — ACETAMINOPHEN 500 MG
1000 TABLET ORAL ONCE AS NEEDED
Status: ACTIVE | OUTPATIENT
Start: 2021-07-19 | End: 2021-07-19

## 2021-07-19 RX ORDER — HYDROCODONE BITARTRATE AND ACETAMINOPHEN 5; 325 MG/1; MG/1
2 TABLET ORAL AS NEEDED
Status: DISCONTINUED | OUTPATIENT
Start: 2021-07-19 | End: 2021-07-20 | Stop reason: HOSPADM

## 2021-07-19 RX ORDER — SODIUM CHLORIDE, SODIUM LACTATE, POTASSIUM CHLORIDE, CALCIUM CHLORIDE 600; 310; 30; 20 MG/100ML; MG/100ML; MG/100ML; MG/100ML
INJECTION, SOLUTION INTRAVENOUS CONTINUOUS
Status: DISCONTINUED | OUTPATIENT
Start: 2021-07-19 | End: 2021-07-23

## 2021-07-19 RX ORDER — DIPHENHYDRAMINE HYDROCHLORIDE 50 MG/ML
12.5 INJECTION INTRAMUSCULAR; INTRAVENOUS AS NEEDED
Status: DISCONTINUED | OUTPATIENT
Start: 2021-07-19 | End: 2021-07-20 | Stop reason: HOSPADM

## 2021-07-19 RX ORDER — SODIUM CHLORIDE, SODIUM LACTATE, POTASSIUM CHLORIDE, CALCIUM CHLORIDE 600; 310; 30; 20 MG/100ML; MG/100ML; MG/100ML; MG/100ML
INJECTION, SOLUTION INTRAVENOUS CONTINUOUS
Status: DISCONTINUED | OUTPATIENT
Start: 2021-07-19 | End: 2021-07-20 | Stop reason: HOSPADM

## 2021-07-19 RX ORDER — HYDROMORPHONE HYDROCHLORIDE 1 MG/ML
0.4 INJECTION, SOLUTION INTRAMUSCULAR; INTRAVENOUS; SUBCUTANEOUS EVERY 5 MIN PRN
Status: DISCONTINUED | OUTPATIENT
Start: 2021-07-19 | End: 2021-07-20 | Stop reason: HOSPADM

## 2021-07-19 RX ORDER — LIDOCAINE HYDROCHLORIDE 10 MG/ML
INJECTION, SOLUTION EPIDURAL; INFILTRATION; INTRACAUDAL; PERINEURAL AS NEEDED
Status: DISCONTINUED | OUTPATIENT
Start: 2021-07-19 | End: 2021-07-19 | Stop reason: SURG

## 2021-07-19 RX ORDER — SODIUM CHLORIDE 9 MG/ML
INJECTION, SOLUTION INTRAVENOUS CONTINUOUS PRN
Status: DISCONTINUED | OUTPATIENT
Start: 2021-07-19 | End: 2021-07-19 | Stop reason: SURG

## 2021-07-19 RX ORDER — METOPROLOL SUCCINATE 25 MG/1
25 TABLET, EXTENDED RELEASE ORAL
Status: DISCONTINUED | OUTPATIENT
Start: 2021-07-20 | End: 2021-07-27

## 2021-07-19 RX ORDER — METOCLOPRAMIDE HYDROCHLORIDE 5 MG/ML
INJECTION INTRAMUSCULAR; INTRAVENOUS AS NEEDED
Status: DISCONTINUED | OUTPATIENT
Start: 2021-07-19 | End: 2021-07-19 | Stop reason: SURG

## 2021-07-19 RX ORDER — HYDROCODONE BITARTRATE AND ACETAMINOPHEN 5; 325 MG/1; MG/1
1 TABLET ORAL AS NEEDED
Status: DISCONTINUED | OUTPATIENT
Start: 2021-07-19 | End: 2021-07-20 | Stop reason: HOSPADM

## 2021-07-19 RX ORDER — MEPERIDINE HYDROCHLORIDE 25 MG/ML
12.5 INJECTION INTRAMUSCULAR; INTRAVENOUS; SUBCUTANEOUS AS NEEDED
Status: DISCONTINUED | OUTPATIENT
Start: 2021-07-19 | End: 2021-07-20 | Stop reason: HOSPADM

## 2021-07-19 RX ORDER — LABETALOL HYDROCHLORIDE 5 MG/ML
5 INJECTION, SOLUTION INTRAVENOUS EVERY 5 MIN PRN
Status: DISCONTINUED | OUTPATIENT
Start: 2021-07-19 | End: 2021-07-20 | Stop reason: HOSPADM

## 2021-07-19 RX ORDER — DEXTROSE MONOHYDRATE 25 G/50ML
50 INJECTION, SOLUTION INTRAVENOUS
Status: DISCONTINUED | OUTPATIENT
Start: 2021-07-19 | End: 2021-07-20 | Stop reason: HOSPADM

## 2021-07-19 RX ORDER — METOCLOPRAMIDE HYDROCHLORIDE 5 MG/ML
10 INJECTION INTRAMUSCULAR; INTRAVENOUS AS NEEDED
Status: DISCONTINUED | OUTPATIENT
Start: 2021-07-19 | End: 2021-07-20 | Stop reason: HOSPADM

## 2021-07-19 RX ADMIN — SODIUM CHLORIDE: 9 INJECTION, SOLUTION INTRAVENOUS at 21:34:00

## 2021-07-19 RX ADMIN — METOCLOPRAMIDE HYDROCHLORIDE 10 MG: 5 INJECTION INTRAMUSCULAR; INTRAVENOUS at 21:43:00

## 2021-07-19 RX ADMIN — LIDOCAINE HYDROCHLORIDE 50 MG: 10 INJECTION, SOLUTION EPIDURAL; INFILTRATION; INTRACAUDAL; PERINEURAL at 21:43:00

## 2021-07-19 NOTE — PROGRESS NOTES
[facility] Hospitalist Progress Note     PCP: Verna Webb DO    Chief Complaint: follow-up    Overnight/Interim Events:  NSVT o/n.     SUBJECTIVE:  Laying in bed, hasn't eaten or walked today as was getting ultrasound.      OBJECTIVE:  Temp:  [98 °F (36.7 355.0  --  360.0   BAND 8  --   --   --   --   --   --   --   --  2   INR 4.75* 4.05*  --   --   --   --   --   --   --   --     < > = values in this interval not displayed.        Recent Labs   Lab 07/15/21  0438 07/16/21  0525 07/17/21  0441 07/18/21  051 which contains colon, small bowel, and a markedly thick wall bladder  - surgery following, apprec, rec o/p f/u      # moderate bilateral hydronephrosis  # Obstructive uropathy 2/2 above   - apprec  recs  - hanson, s/p CBI  - possible ureteral stent placem

## 2021-07-19 NOTE — PHYSICAL THERAPY NOTE
IP PT attempted, pt refusing despite multiple attempts and education on benefits of mobility. Pt states frustration with current situation and medical course. Will re-attempt as schedule permits.

## 2021-07-19 NOTE — CONSULTS
INFECTIOUS DISEASE [address] Vasquez Patient Status:  Inpatient    1960 MRN [id number]   [facility] 3NW-A Attending Doris Zhu MD   [facility] Day # 6 PCP Hannah Mckeon (DEX-4) chewable tab 4 tablet, 4 tablet, Oral, Q15 Min PRN **OR** dextrose 50 % injection 50 mL, 50 mL, Intravenous, Q15 Min PRN **OR** glucose (DEX4) oral liquid 30 g, 30 g, Oral, Q15 Min PRN **OR** Glucose-Vitamin C (DEX-4) chewable tab 8 tablet, 8 table mucous membranes. Extraocular muscles are intact. Neck: No swelling, no masses  Respiratory: Non labored, symmetric exursion  Cardiovascular: No irregularities in rhythm  Abdomen: Soft, nontender, nondistended.     Musculoskeletal: Full range of motion of hyperglycemia, without long-term current use of insulin (HCC)     Recurrent acute deep vein thrombosis (DVT) of both lower extremities (HCC)     Anxiety disorder     Severe alcohol use disorder (HCC)     DCM (dilated cardiomyopathy) (HCC)     Chronic systo

## 2021-07-19 NOTE — PLAN OF CARE
Pt axox4, lungs clear, active bowel sounds, pts CBI was stopped but noticed dark colored blood and clots, but because the pts scrotum is enlarged it causes the ends of the CBI to come off repeatedly during the night, irrigated hanson with syring and a lot o risk factors for pressure ulcer development  - Assess and document skin integrity  - Monitor for areas of redness and/or skin breakdown  - Initiate interventions, skin care algorithm/standards of care as needed  Outcome: Progressing     Problem: Adam Hebert

## 2021-07-19 NOTE — PLAN OF CARE
Problem: Patient/Family Goals  Goal: Patient/Family Long Term Goal  Description: Patient's Long Term Goal:     Interventions:  -   - See additional Care Plan goals for specific interventions  Outcome: Progressing  Goal: Patient/Family Short Term Goal  Sjeal Velásquez

## 2021-07-19 NOTE — PROGRESS NOTES
[facility]  Progress Note    [address] [facility] Patient Status:  Inpatient    1960 MRN [id number]   [facility] 3NW-A Attending Valeriano John MD   [address] Day # 6 PCP Zeke Moeller DO     Subjective:    Patient is scheduled (DVT) of both lower extremities (HCC)     Anxiety disorder     Severe alcohol use disorder (HCC)     DCM (dilated cardiomyopathy) (HCC)     Chronic systolic CHF (congestive heart failure) (HCC)     Hx of pulmonary embolus     Obesity, morbid ([address]     Vitr

## 2021-07-19 NOTE — PROGRESS NOTES
[facility]  Urology Progress Note    Tamekadavid Grant Patient Status:  Inpatient    1960 MRN [id number]   [facility] 3NW-A Attending Valeri Palma MD   [address] Day # 6 PCP Marcos Heath DO     Subjective:  Nona Tapia amaya     2/2 blood cultures 7/14/21: prelim no growth after 4 days     COVID test 7/13/21: negative      Xarelto on hold     Renal/bladder US 7/15/21: Moderate right hydronephrosis and mild to moderate left hydronephrosis is likely stable from recent irrigate hanson prn. NPO after MN and will re-assess in the morning. Above discussed with nurse  Will update Dr. Tanner Rome.       CHRISTINE Grossman  [facility] Urology    Addendum:  Nurse contact urology - patient wanting to proceed with cystoscopy, bilateral r

## 2021-07-19 NOTE — CONSULTS
[facility] Cardiology Consultation    Romaine Opal Patient Status:  Inpatient    1960 MRN [id number]   [facility] 3NW-A Attending Dennys Pollock MD   [facility] Day # 6 PCP Rosmery Russell DO     Reason for Consultation:  DCM, periope Exam:      Temp:  [98 °F (36.7 °C)-99.8 °F (37.7 °C)] 98 °F (36.7 °C)  Pulse:  [85-96] 85  Resp:  [13-18] 15  BP: (112-122)/(62-69) 112/65    Last 3 Weights  07/15/21 0500 : 236 lb 8.9 oz (107.3 kg)  07/14/21 0400 : 234 lb 9.1 oz (106.4 kg)  07/13/21 1023 resultant BL hydronephrosis. 3. Non ischemic DCM with EF near 25-30%. Presumed due to ETOH. 4. H/o DVT/PE 2/21 - on xarelto on admit. 5. DM  6. Mild aortic stenosis. 7. LE wounds  8. Hypotension, now on midodrine.     Plan:  Heart wise, he is stable fo

## 2021-07-19 NOTE — PAYOR COMM NOTE
--------------  CONTINUED STAY REVIEW    Payor: Wendy Josenomi Goldberg 673 #:  [id number]  Authorization Number: [id number]    Admit date: 7/13/21  Admit time: 10:15 AM    Admitting Physician: Marybeth Baxter MD  Attending Physician:  Opal Wise Oximetry Type: Continuous  Oximetry Probe Site Changed: No  Pulse Ox Probe Location: Left hand                ASSESSMENT/PLAN:  1. Septic Shock  -due to urinary source  -weaned off pressors  2.  UTI  -due to postobstructive uropathy  -cont zosyn (7/13-  ), 07/16/21  0525 07/17/21  0441 07/18/21  0510    140 145 144 142   K 3.3* 4.0 3.7 3.6 4.0    110 114* 115* 114*   CO2 23.0 23.0 24.0 25.0 23.0   BUN 73* 62* 45* 34* 30*   CREATSERUM 1.69* 1.49* 1.12 1.00 0.81   CA 8.0* 7.6* 7.9* 7.6* 8.1*   MG 1 protocol     # Recurrent DVT/PE  - xarelto, held for anemia     # LE wounds   - wound care  - pt reports prior fall as well.      Dispo: ICU-->floor when bed avail  - discuss dc planning, hanson? O/p hernia surgery?     7/18 UROLOGY NOTE  Assessment:    Larg

## 2021-07-20 ENCOUNTER — ANESTHESIA EVENT (OUTPATIENT)
Dept: SURGERY | Facility: HOSPITAL | Age: 61
DRG: 853 | End: 2021-07-20
Payer: MEDICARE

## 2021-07-20 ENCOUNTER — APPOINTMENT (OUTPATIENT)
Dept: CV DIAGNOSTICS | Facility: HOSPITAL | Age: 61
DRG: 853 | End: 2021-07-20
Attending: INTERNAL MEDICINE
Payer: MEDICARE

## 2021-07-20 LAB
ANTIBODY SCREEN: NEGATIVE
BASOPHILS # BLD AUTO: 0.1 X10(3) UL (ref 0–0.2)
BASOPHILS NFR BLD AUTO: 0.8 %
DEPRECATED RDW RBC AUTO: 58.3 FL (ref 35.1–46.3)
EOSINOPHIL # BLD AUTO: 0.25 X10(3) UL (ref 0–0.7)
EOSINOPHIL NFR BLD AUTO: 2.1 %
ERYTHROCYTE [DISTWIDTH] IN BLOOD BY AUTOMATED COUNT: 18.2 % (ref 11–15)
GLUCOSE BLD-MCNC: 143 MG/DL (ref 70–99)
GLUCOSE BLD-MCNC: 143 MG/DL (ref 70–99)
GLUCOSE BLD-MCNC: 147 MG/DL (ref 70–99)
HCT VFR BLD AUTO: 24.4 %
HGB BLD-MCNC: 7.4 G/DL
HGB RETIC QN AUTO: 26.7 PG (ref 28.2–36.6)
IMM GRANULOCYTES # BLD AUTO: 0.23 X10(3) UL (ref 0–1)
IMM GRANULOCYTES NFR BLD: 1.9 %
IMM RETICS NFR: 0.4 RATIO (ref 0.1–0.3)
LYMPHOCYTES # BLD AUTO: 0.97 X10(3) UL (ref 1–4)
LYMPHOCYTES NFR BLD AUTO: 8.1 %
MCH RBC QN AUTO: 27.3 PG (ref 26–34)
MCHC RBC AUTO-ENTMCNC: 30.3 G/DL (ref 31–37)
MCV RBC AUTO: 90 FL
MONOCYTES # BLD AUTO: 1.09 X10(3) UL (ref 0.1–1)
MONOCYTES NFR BLD AUTO: 9.2 %
NEUTROPHILS # BLD AUTO: 9.27 X10 (3) UL (ref 1.5–7.7)
NEUTROPHILS # BLD AUTO: 9.27 X10(3) UL (ref 1.5–7.7)
NEUTROPHILS NFR BLD AUTO: 77.9 %
PLATELET # BLD AUTO: 415 10(3)UL (ref 150–450)
RBC # BLD AUTO: 2.71 X10(6)UL
RETICS # AUTO: 77.2 X10(3) UL (ref 22.5–147.5)
RETICS/RBC NFR AUTO: 2.9 %
RH BLOOD TYPE: POSITIVE
WBC # BLD AUTO: 11.9 X10(3) UL (ref 4–11)

## 2021-07-20 PROCEDURE — 82962 GLUCOSE BLOOD TEST: CPT

## 2021-07-20 PROCEDURE — 85045 AUTOMATED RETICULOCYTE COUNT: CPT | Performed by: INTERNAL MEDICINE

## 2021-07-20 PROCEDURE — 93306 TTE W/DOPPLER COMPLETE: CPT | Performed by: INTERNAL MEDICINE

## 2021-07-20 PROCEDURE — 86900 BLOOD TYPING SEROLOGIC ABO: CPT | Performed by: PHYSICIAN ASSISTANT

## 2021-07-20 PROCEDURE — 86850 RBC ANTIBODY SCREEN: CPT | Performed by: PHYSICIAN ASSISTANT

## 2021-07-20 PROCEDURE — 97110 THERAPEUTIC EXERCISES: CPT

## 2021-07-20 PROCEDURE — 86901 BLOOD TYPING SEROLOGIC RH(D): CPT | Performed by: PHYSICIAN ASSISTANT

## 2021-07-20 PROCEDURE — 86920 COMPATIBILITY TEST SPIN: CPT

## 2021-07-20 PROCEDURE — 85025 COMPLETE CBC W/AUTO DIFF WBC: CPT | Performed by: PHYSICIAN ASSISTANT

## 2021-07-20 PROCEDURE — 97530 THERAPEUTIC ACTIVITIES: CPT

## 2021-07-20 RX ORDER — ONDANSETRON 2 MG/ML
4 INJECTION INTRAMUSCULAR; INTRAVENOUS EVERY 6 HOURS PRN
Status: DISCONTINUED | OUTPATIENT
Start: 2021-07-20 | End: 2021-07-27

## 2021-07-20 RX ORDER — DOCUSATE SODIUM 100 MG/1
100 CAPSULE, LIQUID FILLED ORAL 2 TIMES DAILY
Status: DISCONTINUED | OUTPATIENT
Start: 2021-07-20 | End: 2021-07-27

## 2021-07-20 RX ORDER — MAGNESIUM CARB/ALUMINUM HYDROX 105-160MG
296 TABLET,CHEWABLE ORAL
Status: COMPLETED | OUTPATIENT
Start: 2021-07-20 | End: 2021-07-20

## 2021-07-20 RX ORDER — MAGNESIUM HYDROXIDE 1200 MG/15ML
3000 LIQUID ORAL CONTINUOUS
Status: DISCONTINUED | OUTPATIENT
Start: 2021-07-20 | End: 2021-07-27

## 2021-07-20 RX ORDER — OXYBUTYNIN CHLORIDE 5 MG/1
5 TABLET ORAL EVERY 12 HOURS PRN
Status: DISCONTINUED | OUTPATIENT
Start: 2021-07-20 | End: 2021-07-27

## 2021-07-20 RX ORDER — ONDANSETRON 4 MG/1
4 TABLET, FILM COATED ORAL EVERY 6 HOURS PRN
Status: DISCONTINUED | OUTPATIENT
Start: 2021-07-20 | End: 2021-07-27

## 2021-07-20 RX ORDER — PHENAZOPYRIDINE HYDROCHLORIDE 200 MG/1
200 TABLET, FILM COATED ORAL 3 TIMES DAILY PRN
Status: DISCONTINUED | OUTPATIENT
Start: 2021-07-20 | End: 2021-07-27

## 2021-07-20 NOTE — ANESTHESIA PREPROCEDURE EVALUATION
PRE-OP EVALUATION    Patient Name: [facility] Payer    Admit Diagnosis: Urinary retention [R33.9]  MUMTAZ (acute kidney injury) ([facility] [address] [N17.9]  Cellulitis of left foot [L03.116]  Urinary tract infection with hematuria, site unspecified [N39.0, R31.9]  Septic chloride 0.9% IV bolus 2,052 mL, 30 mL/kg (Ideal), Intravenous, Once  [COMPLETED] Piperacillin Sod-Tazobactam So (ZOSYN) 4.5 g in dextrose 5% 100 mL IVPB-ADDV, 4.5 g, Intravenous, Once  [COMPLETED] acetaminophen (TYLENOL EXTRA STRENGTH) tab 1,000 mg, 1,000 3, 7/12/2021 at am  aspirin 81 MG Oral Tab EC, Take 1 tablet (81 mg total) by mouth daily. , Disp: 30 tablet, Rfl: 11, Past Week at Unknown time        Allergies: Patient has no known allergies. Anesthesia Evaluation    Patient summary reviewed.     Ane I  Mouth opening: >3 FB  TM distance: > 6 cm  Neck ROM: full Cardiovascular    Cardiovascular exam normal.  Rhythm: regular  Rate: normal  (-) murmur   Dental  Comment: Multiple missing teeth, no loose teeth           Pulmonary    Pulmonary exam normal.  B

## 2021-07-20 NOTE — PROGRESS NOTES
[facility]                INFECTIOUS DISEASE PROGRESS NOTE    [address] [facility] Patient Status:  Inpatient    1960 MRN [id number]   [facility] 3NW-A Attending Mary Jane Ace MD   [facility] Day # 7 PCP Art Navarrete DO ALKPHO [phone]   AST 39* 29 32   ALT 26 24 22   BILT 0.6 0.6 0.5   TP 5.3* 5.7* 5.7*       No results found for: [facility] Encounter on 07/13/21   1.  Blood Culture     Status: None    Collection Time: 07/14/21 10:30 AM    Specimen: placed 7/13, 2L of brown urine  Urine cultures no growth  Strep anginosis in 2 of 2 blood cultures     Continue Unasyn      following cysto planned  Surgery following hernia repair with mesh in the future    2.  Chiquis Medley MD

## 2021-07-20 NOTE — ANESTHESIA PREPROCEDURE EVALUATION
PRE-OP EVALUATION    Patient Name: Colleen Lala    Admit Diagnosis: Urinary retention [R33.9]  MUMTAZ (acute kidney injury) ([facility] [N17.9]  Cellulitis of left foot [L03.116]  Urinary tract infection with hematuria, site unspecified [N39.0, R31.9]  Septic Once  [COMPLETED] Magnesium Sulfate IVPB premix SOLN 2 g, 2 g, Intravenous, Once  [COMPLETED] magnesium oxide (MAG-OX) tab 400 mg, 400 mg, Oral, Once  Midodrine HCl (PROAMATINE) tab 10 mg, 10 mg, Oral, TID  famoTIDine (PEPCID) tab 20 mg, 20 mg, Oral, BID Tab, Take 1 tablet by mouth 2 (two) times daily. , Disp: 180 tablet, Rfl: 3, 7/12/2021 at Unknown time  furosemide 40 MG Oral Tab, Take 1.5 tablets (60 mg total) by mouth daily. , Disp: 135 tablet, Rfl: 3, 7/12/2021 at am   Metoprolol Succinate ER 25 MG Oral Obesity, morbid ([address]     Vitreous hemorrhage of both eyes (HCC)     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 were answered and understanding was demonstrated of risks.  Informed permission was obtained to proceed as documented in the signed consent form.       Will receive blood this am prior to procedure    Plan/risks discussed with: patient  Use of blood produc

## 2021-07-20 NOTE — ANESTHESIA PROCEDURE NOTES
Airway  Date/Time: 7/19/2021 9:43 PM  Urgency: elective      General Information and Staff    Patient location during procedure: OR  Anesthesiologist: Shan Mendosa MD  Performed: anesthesiologist     Indications and Patient Condition  Indications for

## 2021-07-20 NOTE — PROGRESS NOTES
1050- Writing RN unable to page vu/onc MD, hospitalist notified. Per hospitalist, he will message vu/onc Md.

## 2021-07-20 NOTE — PROGRESS NOTES
[facility] Cardiology Progress Note - Dionisio Nyhan [facility] Patient Status:  Inpatient    1960 MRN [id number]   [facility] 3NW-A Attending Cora Marmolejo MD   [address] Day # 7 PCP DO Ree Mendes ([facility]     MUMTAZ (acute kidney injury) ([facility]     Cellulitis of left foot     Urinary retention     Urinary tract infection with hematuria, site unspecified      Objective:   Temp: 97.9 °F (36.6 °C)  Pulse: 78  Resp: 18  BP: 114/66    Intake/Output:     Intake > = values in this interval not displayed.        Recent Labs   Lab 07/15/21  0438 07/16/21  0525 07/17/21  0441 07/18/21  0510 07/19/21  0601    145 144 142 142   K 4.0 3.7 3.6 4.0 3.9    114* 115* 114* 113*   CO2 23.0 24.0 25.0 23.0 23.0   BUN Subcutaneous, TID CC and HS  acetaminophen (TYLENOL) tab 650 mg, 650 mg, Oral, Q6H PRN  nystatin (MYCOSTATIN) [id number] UNIT/GM ointment, , Topical, BID        ROS:  General Health: otherwise feels well, weight stable  Constitutiona: no recent fevers  Skin: d

## 2021-07-20 NOTE — PLAN OF CARE
Pt alert and oriented x2, disoriented to location and time, can be forgetful. RA. Tele, NSR HR 90's. 1800 carb controlled diet, accucheck QID. Pt is passing gas/belching. Pt denies SOB, chest pain, n/v. Tolerating diet. Newsome intact, CBI clamped.  Urine amb for signs of decreased coronary artery perfusion - ex.  Angina  - Evaluate fluid balance, assess for edema, trend weights  Outcome: Progressing     Problem: SKIN/TISSUE INTEGRITY - ADULT  Goal: Skin integrity remains intact  Description: INTERVENTIONS  - As Administer ordered medications to maintain glucose within target range  - Assess barriers to adequate nutritional intake and initiate nutrition consult as needed  - Instruct patient on self management of diabetes  Outcome: Progressing

## 2021-07-20 NOTE — BRIEF OP NOTE
Pre-Operative Diagnosis: Hematuria [R31.9], right incarcerated inguinal hernia including bladder     Post-Operative Diagnosis: Hematuria [R31.9], right incarcerated inguinal hernia including bladder     Procedure Performed:   CYSTOSCOPY, CLOT EVACUATION, c

## 2021-07-20 NOTE — PROGRESS NOTES
[facility]  Urology Progress Note    Laine Merida Patient Status:  Inpatient    1960 MRN [id number]   [facility] 3NW-A Attending Doris Zhu MD   [facility] Day # 7 PCP Fariba Nath DO     Subjective:  Yobany George technique. Rommie Ruff is a Newsome catheter within the bladder which is decompressed, limiting evaluation.     Bladder US 7/19/21: The bladder is nearly completely empty due to the Newsome catheter.  The volume is approximately 77 cc.  There are multiple irregular

## 2021-07-20 NOTE — CM/SW NOTE
MSW met with pt at bedside, he is supposed to have surgery on 7/21. He is not sure if he wants [facility] or [facility]. MSW to follow up after surgery to discuss options with pt. PT rec is currently [facility].       [facility] referrals pending  DON req

## 2021-07-20 NOTE — DIETARY NOTE
[facility]     Admitting diagnosis:  Urinary retention [R33.9]  MUMTAZ (acute kidney injury) ([facility] [N17.9]  Cellulitis of left foot [L03.116]  Urinary tract infection with hematuria, site unspecified [N39.0, R31

## 2021-07-20 NOTE — PHYSICAL THERAPY NOTE
PHYSICAL THERAPY TREATMENT NOTE - INPATIENT    Room Number: 324/324-A     Session: 1   Number of Visits to Meet Established Goals: 5    Presenting Problem: sepsis, UTI    History related to current admission: Pt is a 61 y.o. male admitted 7/13/21 with scr over in bed (including adjusting bedclothes, sheets and blankets)?: A Lot   -   Sitting down on and standing up from a chair with arms (e.g., wheelchair, bedside commode, etc.): A Lot   -   Moving from lying on back to sitting on the side of the bed?: A Lo podiatry consult as L second toe is limiting pt's ability to stand. Pt will continue to benefit from ongoing IP PT to maximize functional independence.   The AM-PAC '6-Clicks' Inpatient Basic Mobility Short Form was completed and this patient is demonstrat

## 2021-07-20 NOTE — PROGRESS NOTES
Writing RN spoke with podiatry office who will send page to on call podiatry MD.    1400- pt resting in bed comfortably. Pt able to turn with max assist. Dangled at side of bed this morning and worked with PT. Will continue to monitor.     0- Podiatry MD

## 2021-07-20 NOTE — ANESTHESIA POSTPROCEDURE EVALUATION
On license of [facility] Patient Status:  Inpatient   Age/Gender 64year old male MRN [id number]   Location [address] [facility] Attending Guicho Reyes MD   [address] Day # 6 PCP Ashok Lizarraga, DO       Anesthesia Pos

## 2021-07-20 NOTE — CONSULTS
[facility]  Report of Consultation    Fifi Vang Patient Status:  Inpatient    1960 MRN [id number]   [facility] 3NW-A Attending Archie Lopez MD   [facility] Day # 7 PCP Verna Webb DO     ADMIT DATE AND TIME:  mg, Oral, BID  •  ondansetron HCl (ZOFRAN) injection 4 mg, 4 mg, Intravenous, Q6H PRN **OR** Ondansetron HCl (ZOFRAN) tab 4 mg, 4 mg, Oral, Q6H PRN  •  Phenazopyridine HCl (PYRIDIUM) tab 200 mg, 200 mg, Oral, TID PRN  •  Oxybutynin Chloride (DITROPAN) tab rate and rhythm. Abdomen: Soft, non tender -   Extremities: Pedal pulses are present.  No edema.   -        DIAGNOSTIC WORK UP:     Laboratory Data:      Recent Results (from the past 24 hour(s))   Prothrombin Time (PT)    Collection Time: 07/19/21  5:10 Glucose 143 (H) 70 - 99 mg/dL       Recent Labs   Lab 07/18/21  0510 07/18/21  0510 07/19/21  0020 07/19/21  0601 07/20/21  0947   RBC 2.86*  --   --  2.76* 2.71*   HGB 7.6*   < > 7.8* 7.5* 7.4*   HCT 25.2*   < > 25.3* 24.2* 24.4*   MCV 88.1  --   --  87.7 swelling all around the foot and ankle. No gas collection seen. No destructive or lytic lesion seen. If further imaging of the foot needed, consider MRI follow-up.    Dictated by (CST): Lori Rivers MD on 7/13/2021 at 7:49 AM     Finalized by (CST): BRENDEN Cherelle Cardoza MD on 7/13/2021 at 7:49 AM           PROBLEM LIST:     Patient Active Problem List:     Type 2 diabetes mellitus with stage 3a chronic kidney disease, without long-term current use of insulin ([facility]     Hypertension     Acute on chronic righ follow        Thank you for allowing me to participate in the care of your patient.     Abiola Ames MD [place]

## 2021-07-20 NOTE — PROGRESS NOTES
[facility] Hospitalist Progress Note     PCP: Albesa Favre, DO    Chief Complaint: follow-up    Overnight/Interim Events:  NSVT o/n.     SUBJECTIVE:  Laying in bed, working c PT. Legs better, reports stubbed toe previously and had some pus.      OBJECTIVE: 362.0 355.0  --  360.0  --    BAND  --   --   --   --   --   --   --   --  2  --    INR 4.05*  --   --   --   --   --   --   --   --  1.18*    < > = values in this interval not displayed.        Recent Labs   Lab 07/15/21  0438 07/16/21  0525 07/17/21  0441 24hrs  - lactate 2.8 to 1.4   - follow temps, WBC 13.3 to 11.9  - ID consult apprec      # massive right incarcerated inguinal hernia which contains colon, small bowel, and a markedly thick wall bladder  - surgery following, apprec, plan for surgical repai hospitalist to resume care in AM, will discuss plan with them    Hermelindo Forrest MD  [facility] Hospitalist  [phone]  7/20/2021  2:45 PM

## 2021-07-20 NOTE — PROGRESS NOTES
[facility]  Progress Note    [address] [facility] Patient Status:  Inpatient    1960 MRN [id number]   [facility] 3NW-A Attending Verónica Thomas MD   [facility] Day # 7 PCP Elena Cason DO     Subjective:    Patient denies any n atherosclerosis ([facility]     Pulmonary hypertension (HCC)     Multiple subsegmental pulmonary emboli without acute cor pulmonale (HCC)     Sepsis secondary to UTI ([address]     Septic shock (HCC)     MUMTAZ (acute kidney injury) ([address]     Cellulitis of left foot

## 2021-07-20 NOTE — PLAN OF CARE
Appx 1940 Patient down to OR, A&Ox4, in stable condition and family at bedside. CBI and fluids infusing. Appx 0030 Patient back from PACU accompanied by family. Patient is A&Ox4, VSS. IVF/abx continued. Patient able to tolerate diet.  CBI patent, clear Problem: SKIN/TISSUE INTEGRITY - ADULT  Goal: Skin integrity remains intact  Description: INTERVENTIONS  - Assess and document risk factors for pressure ulcer development  - Assess and document skin integrity  - Monitor for areas of redness and/or skin b consult as needed  - Instruct patient on self management of diabetes  Outcome: Progressing

## 2021-07-20 NOTE — OPERATIVE REPORT
[address]    PATIENT'S NAME: Wilfredo Beal   ATTENDING PHYSICIAN: Zenia Arellano. Junie Lemus M.D. OPERATING PHYSICIAN: Radha Pinon M.D.    PATIENT ACCOUNT#:   [de-identified]    LOCATION:  [address] [place] [address] A [facility]  MEDICAL RECORD #:   [id number]       DATE OF BIR to impingement of the bladder wall in its descent into the incarcerated right inguinal hernia. The flexible cystoscope was then withdrawn, and a 22-Syriac 3-way Newsome catheter was advanced over the Glidewire.   A cystogram was then performed, noting the ba

## 2021-07-20 NOTE — PROGRESS NOTES
[phone]- Writing RN attempted to consult podiatry, unable to get a hold of anyone. Will attempt later this afternoon.

## 2021-07-21 ENCOUNTER — ANESTHESIA (OUTPATIENT)
Dept: SURGERY | Facility: HOSPITAL | Age: 61
DRG: 853 | End: 2021-07-21
Payer: MEDICARE

## 2021-07-21 LAB
ALBUMIN SERPL-MCNC: 1.7 G/DL (ref 3.4–5)
ALBUMIN/GLOB SERPL: 0.4 {RATIO} (ref 1–2)
ALP LIVER SERPL-CCNC: 99 U/L
ALT SERPL-CCNC: 24 U/L
ANION GAP SERPL CALC-SCNC: 6 MMOL/L (ref 0–18)
AST SERPL-CCNC: 32 U/L (ref 15–37)
BASOPHILS # BLD AUTO: 0.11 X10(3) UL (ref 0–0.2)
BASOPHILS NFR BLD AUTO: 1 %
BILIRUB SERPL-MCNC: 0.7 MG/DL (ref 0.1–2)
BUN BLD-MCNC: 25 MG/DL (ref 7–18)
BUN/CREAT SERPL: 28.7 (ref 10–20)
CALCIUM BLD-MCNC: 8.4 MG/DL (ref 8.5–10.1)
CHLORIDE SERPL-SCNC: 112 MMOL/L (ref 98–112)
CO2 SERPL-SCNC: 24 MMOL/L (ref 21–32)
CREAT BLD-MCNC: 0.87 MG/DL
DEPRECATED RDW RBC AUTO: 59.4 FL (ref 35.1–46.3)
EOSINOPHIL # BLD AUTO: 0.22 X10(3) UL (ref 0–0.7)
EOSINOPHIL NFR BLD AUTO: 2.1 %
ERYTHROCYTE [DISTWIDTH] IN BLOOD BY AUTOMATED COUNT: 18 % (ref 11–15)
GLOBULIN PLAS-MCNC: 4.6 G/DL (ref 2.8–4.4)
GLUCOSE BLD-MCNC: 101 MG/DL (ref 70–99)
GLUCOSE BLD-MCNC: 112 MG/DL (ref 70–99)
GLUCOSE BLD-MCNC: 132 MG/DL (ref 70–99)
GLUCOSE BLD-MCNC: 152 MG/DL (ref 70–99)
GLUCOSE BLD-MCNC: 87 MG/DL (ref 70–99)
GLUCOSE BLD-MCNC: 91 MG/DL (ref 70–99)
GLUCOSE BLD-MCNC: 92 MG/DL (ref 70–99)
HAPTOGLOB SERPL-MCNC: 374 MG/DL (ref 30–200)
HAV IGM SER QL: 2.1 MG/DL (ref 1.6–2.6)
HCT VFR BLD AUTO: 27.5 %
HGB BLD-MCNC: 8 G/DL
IMM GRANULOCYTES # BLD AUTO: 0.19 X10(3) UL (ref 0–1)
IMM GRANULOCYTES NFR BLD: 1.8 %
LYMPHOCYTES # BLD AUTO: 1.1 X10(3) UL (ref 1–4)
LYMPHOCYTES NFR BLD AUTO: 10.3 %
M PROTEIN MFR SERPL ELPH: 6.3 G/DL (ref 6.4–8.2)
MCH RBC QN AUTO: 26.7 PG (ref 26–34)
MCHC RBC AUTO-ENTMCNC: 29.1 G/DL (ref 31–37)
MCV RBC AUTO: 91.7 FL
MONOCYTES # BLD AUTO: 1.07 X10(3) UL (ref 0.1–1)
MONOCYTES NFR BLD AUTO: 10 %
NEUTROPHILS # BLD AUTO: 8 X10 (3) UL (ref 1.5–7.7)
NEUTROPHILS # BLD AUTO: 8 X10(3) UL (ref 1.5–7.7)
NEUTROPHILS NFR BLD AUTO: 74.8 %
OSMOLALITY SERPL CALC.SUM OF ELEC: 298 MOSM/KG (ref 275–295)
PHOSPHATE SERPL-MCNC: 2.9 MG/DL (ref 2.5–4.9)
PLATELET # BLD AUTO: 466 10(3)UL (ref 150–450)
POTASSIUM SERPL-SCNC: 4.3 MMOL/L (ref 3.5–5.1)
RBC # BLD AUTO: 3 X10(6)UL
SODIUM SERPL-SCNC: 142 MMOL/L (ref 136–145)
WBC # BLD AUTO: 10.7 X10(3) UL (ref 4–11)

## 2021-07-21 PROCEDURE — 0T9B00Z DRAINAGE OF BLADDER WITH DRAINAGE DEVICE, OPEN APPROACH: ICD-10-PCS | Performed by: UROLOGY

## 2021-07-21 PROCEDURE — 82962 GLUCOSE BLOOD TEST: CPT

## 2021-07-21 PROCEDURE — 83010 ASSAY OF HAPTOGLOBIN QUANT: CPT | Performed by: INTERNAL MEDICINE

## 2021-07-21 PROCEDURE — 88302 TISSUE EXAM BY PATHOLOGIST: CPT | Performed by: SURGERY

## 2021-07-21 PROCEDURE — 87077 CULTURE AEROBIC IDENTIFY: CPT | Performed by: SURGERY

## 2021-07-21 PROCEDURE — 87075 CULTR BACTERIA EXCEPT BLOOD: CPT | Performed by: SURGERY

## 2021-07-21 PROCEDURE — 0VJ80ZZ INSPECTION OF SCROTUM AND TUNICA VAGINALIS, OPEN APPROACH: ICD-10-PCS | Performed by: UROLOGY

## 2021-07-21 PROCEDURE — 30233N1 TRANSFUSION OF NONAUTOLOGOUS RED BLOOD CELLS INTO PERIPHERAL VEIN, PERCUTANEOUS APPROACH: ICD-10-PCS | Performed by: INTERNAL MEDICINE

## 2021-07-21 PROCEDURE — 85025 COMPLETE CBC W/AUTO DIFF WBC: CPT | Performed by: INTERNAL MEDICINE

## 2021-07-21 PROCEDURE — 87205 SMEAR GRAM STAIN: CPT | Performed by: SURGERY

## 2021-07-21 PROCEDURE — 36430 TRANSFUSION BLD/BLD COMPNT: CPT

## 2021-07-21 PROCEDURE — 87070 CULTURE OTHR SPECIMN AEROBIC: CPT | Performed by: SURGERY

## 2021-07-21 PROCEDURE — 84100 ASSAY OF PHOSPHORUS: CPT | Performed by: INTERNAL MEDICINE

## 2021-07-21 PROCEDURE — 80053 COMPREHEN METABOLIC PANEL: CPT | Performed by: INTERNAL MEDICINE

## 2021-07-21 PROCEDURE — 83735 ASSAY OF MAGNESIUM: CPT | Performed by: INTERNAL MEDICINE

## 2021-07-21 PROCEDURE — 0YU50JZ SUPPLEMENT RIGHT INGUINAL REGION WITH SYNTHETIC SUBSTITUTE, OPEN APPROACH: ICD-10-PCS | Performed by: SURGERY

## 2021-07-21 DEVICE — VENTRIO ST HERNIA PATCH
Type: IMPLANTABLE DEVICE | Site: GROIN | Status: FUNCTIONAL
Brand: VENTRIO ST HERNIA PATCH

## 2021-07-21 RX ORDER — METOCLOPRAMIDE HYDROCHLORIDE 5 MG/ML
10 INJECTION INTRAMUSCULAR; INTRAVENOUS AS NEEDED
Status: DISCONTINUED | OUTPATIENT
Start: 2021-07-21 | End: 2021-07-21 | Stop reason: HOSPADM

## 2021-07-21 RX ORDER — SODIUM CHLORIDE 9 MG/ML
INJECTION, SOLUTION INTRAVENOUS ONCE
Status: COMPLETED | OUTPATIENT
Start: 2021-07-21 | End: 2021-07-21

## 2021-07-21 RX ORDER — ZOLPIDEM TARTRATE 5 MG/1
5 TABLET ORAL NIGHTLY PRN
Status: DISCONTINUED | OUTPATIENT
Start: 2021-07-21 | End: 2021-07-27

## 2021-07-21 RX ORDER — HYDROCODONE BITARTRATE AND ACETAMINOPHEN 5; 325 MG/1; MG/1
1 TABLET ORAL AS NEEDED
Status: DISCONTINUED | OUTPATIENT
Start: 2021-07-21 | End: 2021-07-21 | Stop reason: HOSPADM

## 2021-07-21 RX ORDER — ROCURONIUM BROMIDE 10 MG/ML
INJECTION, SOLUTION INTRAVENOUS AS NEEDED
Status: DISCONTINUED | OUTPATIENT
Start: 2021-07-21 | End: 2021-07-21 | Stop reason: SURG

## 2021-07-21 RX ORDER — ONDANSETRON 2 MG/ML
4 INJECTION INTRAMUSCULAR; INTRAVENOUS EVERY 6 HOURS PRN
Status: DISCONTINUED | OUTPATIENT
Start: 2021-07-21 | End: 2021-07-27

## 2021-07-21 RX ORDER — CEFAZOLIN SODIUM 1 G/3ML
INJECTION, POWDER, FOR SOLUTION INTRAMUSCULAR; INTRAVENOUS AS NEEDED
Status: DISCONTINUED | OUTPATIENT
Start: 2021-07-21 | End: 2021-07-21 | Stop reason: SURG

## 2021-07-21 RX ORDER — ACETAMINOPHEN 650 MG/1
650 SUPPOSITORY RECTAL EVERY 6 HOURS PRN
Status: DISCONTINUED | OUTPATIENT
Start: 2021-07-21 | End: 2021-07-27

## 2021-07-21 RX ORDER — LIDOCAINE HYDROCHLORIDE AND EPINEPHRINE 10; 10 MG/ML; UG/ML
INJECTION, SOLUTION INFILTRATION; PERINEURAL AS NEEDED
Status: DISCONTINUED | OUTPATIENT
Start: 2021-07-21 | End: 2021-07-21 | Stop reason: HOSPADM

## 2021-07-21 RX ORDER — OXYCODONE HYDROCHLORIDE 10 MG/1
10 TABLET ORAL EVERY 4 HOURS PRN
Status: DISCONTINUED | OUTPATIENT
Start: 2021-07-21 | End: 2021-07-27

## 2021-07-21 RX ORDER — SODIUM CHLORIDE, SODIUM LACTATE, POTASSIUM CHLORIDE, CALCIUM CHLORIDE 600; 310; 30; 20 MG/100ML; MG/100ML; MG/100ML; MG/100ML
INJECTION, SOLUTION INTRAVENOUS CONTINUOUS
Status: DISCONTINUED | OUTPATIENT
Start: 2021-07-21 | End: 2021-07-21 | Stop reason: HOSPADM

## 2021-07-21 RX ORDER — HYDROMORPHONE HYDROCHLORIDE 1 MG/ML
0.8 INJECTION, SOLUTION INTRAMUSCULAR; INTRAVENOUS; SUBCUTANEOUS EVERY 2 HOUR PRN
Status: DISCONTINUED | OUTPATIENT
Start: 2021-07-21 | End: 2021-07-27

## 2021-07-21 RX ORDER — LIDOCAINE HYDROCHLORIDE 10 MG/ML
INJECTION, SOLUTION EPIDURAL; INFILTRATION; INTRACAUDAL; PERINEURAL AS NEEDED
Status: DISCONTINUED | OUTPATIENT
Start: 2021-07-21 | End: 2021-07-21 | Stop reason: SURG

## 2021-07-21 RX ORDER — ACETAMINOPHEN 120 MG/1
120 SUPPOSITORY RECTAL EVERY 6 HOURS PRN
Status: DISCONTINUED | OUTPATIENT
Start: 2021-07-21 | End: 2021-07-21

## 2021-07-21 RX ORDER — SODIUM CHLORIDE, SODIUM LACTATE, POTASSIUM CHLORIDE, CALCIUM CHLORIDE 600; 310; 30; 20 MG/100ML; MG/100ML; MG/100ML; MG/100ML
INJECTION, SOLUTION INTRAVENOUS CONTINUOUS PRN
Status: DISCONTINUED | OUTPATIENT
Start: 2021-07-21 | End: 2021-07-21 | Stop reason: SURG

## 2021-07-21 RX ORDER — HYDROMORPHONE HYDROCHLORIDE 1 MG/ML
0.4 INJECTION, SOLUTION INTRAMUSCULAR; INTRAVENOUS; SUBCUTANEOUS EVERY 5 MIN PRN
Status: DISCONTINUED | OUTPATIENT
Start: 2021-07-21 | End: 2021-07-21 | Stop reason: HOSPADM

## 2021-07-21 RX ORDER — HEPARIN SODIUM 5000 [USP'U]/ML
5000 INJECTION, SOLUTION INTRAVENOUS; SUBCUTANEOUS EVERY 8 HOURS SCHEDULED
Status: DISCONTINUED | OUTPATIENT
Start: 2021-07-21 | End: 2021-07-27

## 2021-07-21 RX ORDER — ONDANSETRON 2 MG/ML
INJECTION INTRAMUSCULAR; INTRAVENOUS AS NEEDED
Status: DISCONTINUED | OUTPATIENT
Start: 2021-07-21 | End: 2021-07-21 | Stop reason: SURG

## 2021-07-21 RX ORDER — OXYCODONE HYDROCHLORIDE 5 MG/1
5 TABLET ORAL EVERY 4 HOURS PRN
Status: DISCONTINUED | OUTPATIENT
Start: 2021-07-21 | End: 2021-07-27

## 2021-07-21 RX ORDER — BUPIVACAINE HYDROCHLORIDE 5 MG/ML
INJECTION, SOLUTION EPIDURAL; INTRACAUDAL AS NEEDED
Status: DISCONTINUED | OUTPATIENT
Start: 2021-07-21 | End: 2021-07-21 | Stop reason: HOSPADM

## 2021-07-21 RX ORDER — HYDROCODONE BITARTRATE AND ACETAMINOPHEN 5; 325 MG/1; MG/1
2 TABLET ORAL AS NEEDED
Status: DISCONTINUED | OUTPATIENT
Start: 2021-07-21 | End: 2021-07-21 | Stop reason: HOSPADM

## 2021-07-21 RX ORDER — NEOSTIGMINE METHYLSULFATE 1 MG/ML
INJECTION INTRAVENOUS AS NEEDED
Status: DISCONTINUED | OUTPATIENT
Start: 2021-07-21 | End: 2021-07-21 | Stop reason: SURG

## 2021-07-21 RX ORDER — NALOXONE HYDROCHLORIDE 0.4 MG/ML
80 INJECTION, SOLUTION INTRAMUSCULAR; INTRAVENOUS; SUBCUTANEOUS AS NEEDED
Status: DISCONTINUED | OUTPATIENT
Start: 2021-07-21 | End: 2021-07-21 | Stop reason: HOSPADM

## 2021-07-21 RX ORDER — GLYCOPYRROLATE 0.2 MG/ML
INJECTION, SOLUTION INTRAMUSCULAR; INTRAVENOUS AS NEEDED
Status: DISCONTINUED | OUTPATIENT
Start: 2021-07-21 | End: 2021-07-21 | Stop reason: SURG

## 2021-07-21 RX ORDER — SODIUM CHLORIDE 9 MG/ML
INJECTION, SOLUTION INTRAVENOUS CONTINUOUS PRN
Status: DISCONTINUED | OUTPATIENT
Start: 2021-07-21 | End: 2021-07-21 | Stop reason: SURG

## 2021-07-21 RX ORDER — HYDROMORPHONE HYDROCHLORIDE 1 MG/ML
0.4 INJECTION, SOLUTION INTRAMUSCULAR; INTRAVENOUS; SUBCUTANEOUS EVERY 2 HOUR PRN
Status: DISCONTINUED | OUTPATIENT
Start: 2021-07-21 | End: 2021-07-27

## 2021-07-21 RX ORDER — DEXAMETHASONE SODIUM PHOSPHATE 4 MG/ML
VIAL (ML) INJECTION AS NEEDED
Status: DISCONTINUED | OUTPATIENT
Start: 2021-07-21 | End: 2021-07-21 | Stop reason: SURG

## 2021-07-21 RX ORDER — MIDAZOLAM HYDROCHLORIDE 1 MG/ML
INJECTION INTRAMUSCULAR; INTRAVENOUS AS NEEDED
Status: DISCONTINUED | OUTPATIENT
Start: 2021-07-21 | End: 2021-07-21 | Stop reason: SURG

## 2021-07-21 RX ORDER — DEXTROSE, SODIUM CHLORIDE, SODIUM LACTATE, POTASSIUM CHLORIDE, AND CALCIUM CHLORIDE 5; .6; .31; .03; .02 G/100ML; G/100ML; G/100ML; G/100ML; G/100ML
INJECTION, SOLUTION INTRAVENOUS CONTINUOUS
Status: DISCONTINUED | OUTPATIENT
Start: 2021-07-21 | End: 2021-07-23

## 2021-07-21 RX ORDER — DEXTROSE MONOHYDRATE 25 G/50ML
50 INJECTION, SOLUTION INTRAVENOUS
Status: DISCONTINUED | OUTPATIENT
Start: 2021-07-21 | End: 2021-07-21 | Stop reason: HOSPADM

## 2021-07-21 RX ORDER — ETOMIDATE 2 MG/ML
INJECTION INTRAVENOUS AS NEEDED
Status: DISCONTINUED | OUTPATIENT
Start: 2021-07-21 | End: 2021-07-21 | Stop reason: SURG

## 2021-07-21 RX ORDER — ONDANSETRON 2 MG/ML
4 INJECTION INTRAMUSCULAR; INTRAVENOUS AS NEEDED
Status: DISCONTINUED | OUTPATIENT
Start: 2021-07-21 | End: 2021-07-21 | Stop reason: HOSPADM

## 2021-07-21 RX ADMIN — SODIUM CHLORIDE, SODIUM LACTATE, POTASSIUM CHLORIDE, CALCIUM CHLORIDE: 600; 310; 30; 20 INJECTION, SOLUTION INTRAVENOUS at 14:02:00

## 2021-07-21 RX ADMIN — MIDAZOLAM HYDROCHLORIDE 2 MG: 1 INJECTION INTRAMUSCULAR; INTRAVENOUS at 12:30:00

## 2021-07-21 RX ADMIN — ETOMIDATE 8 MG: 2 INJECTION INTRAVENOUS at 12:37:00

## 2021-07-21 RX ADMIN — ROCURONIUM BROMIDE 5 MG: 10 INJECTION, SOLUTION INTRAVENOUS at 12:37:00

## 2021-07-21 RX ADMIN — GLYCOPYRROLATE 0.8 MG: 0.2 INJECTION, SOLUTION INTRAMUSCULAR; INTRAVENOUS at 15:28:00

## 2021-07-21 RX ADMIN — ROCURONIUM BROMIDE 30 MG: 10 INJECTION, SOLUTION INTRAVENOUS at 13:40:00

## 2021-07-21 RX ADMIN — ROCURONIUM BROMIDE 30 MG: 10 INJECTION, SOLUTION INTRAVENOUS at 12:50:00

## 2021-07-21 RX ADMIN — ROCURONIUM BROMIDE 20 MG: 10 INJECTION, SOLUTION INTRAVENOUS at 14:40:00

## 2021-07-21 RX ADMIN — LIDOCAINE HYDROCHLORIDE 50 MG: 10 INJECTION, SOLUTION EPIDURAL; INFILTRATION; INTRACAUDAL; PERINEURAL at 12:37:00

## 2021-07-21 RX ADMIN — ONDANSETRON 4 MG: 2 INJECTION INTRAMUSCULAR; INTRAVENOUS at 15:14:00

## 2021-07-21 RX ADMIN — DEXAMETHASONE SODIUM PHOSPHATE 4 MG: 4 MG/ML VIAL (ML) INJECTION at 13:14:00

## 2021-07-21 RX ADMIN — SODIUM CHLORIDE, SODIUM LACTATE, POTASSIUM CHLORIDE, CALCIUM CHLORIDE: 600; 310; 30; 20 INJECTION, SOLUTION INTRAVENOUS at 15:46:00

## 2021-07-21 RX ADMIN — NEOSTIGMINE METHYLSULFATE 4 MG: 1 INJECTION INTRAVENOUS at 15:28:00

## 2021-07-21 RX ADMIN — SODIUM CHLORIDE: 9 INJECTION, SOLUTION INTRAVENOUS at 12:28:00

## 2021-07-21 RX ADMIN — CEFAZOLIN SODIUM 2 G: 1 INJECTION, POWDER, FOR SOLUTION INTRAMUSCULAR; INTRAVENOUS at 12:45:00

## 2021-07-21 NOTE — PROGRESS NOTES
[facility] Cardiology Progress Note - [facility] Patient Status:  Inpatient    1960 MRN [id number]   [facility] 3NW-A Attending José Manuel Camacho MD   [facility] Day # 8 PCP Caleb Mariano DO     Subjective:  Pa both lower extremities (HCC)     Anxiety disorder     Severe alcohol use disorder (HCC)     DCM (dilated cardiomyopathy) (HCC)     Chronic systolic CHF (congestive heart failure) (HCC)     Hx of pulmonary embolus     Obesity, morbid (HCC)     Vitreous hemo coordinational deficit. Skin: Warm and dry.      Laboratory/Data:    Labs:         Recent Labs   Lab 07/17/21  0441 07/17/21  0441 07/18/21  0510 07/19/21  0020 07/19/21  0601 07/19/21  1710 07/20/21  0947 07/21/21  0723   WBC 12.2*  --  12.5*  --  11.4* Sodium (UNASYN) 3 g in sodium chloride 0.9% 100 mL IVPB-ADDV, 3 g, Intravenous, Q8H  lactated ringers infusion, , Intravenous, Continuous  Midodrine HCl (PROAMATINE) tab 10 mg, 10 mg, Oral, TID  famoTIDine (PEPCID) tab 20 mg, 20 mg, Oral, BID  Sertraline H

## 2021-07-21 NOTE — PROGRESS NOTES
[facility] Hospitalist Progress Note     PCP: Kassidy Urrutia DO    Chief Complaint: follow-up    Overnight/Interim Events:    WILMER overnight  Afebrile, HDS    NPO for hernia repair today     No new complaints, no chest pain/SOB, is a little anxious about the 91.7   .0  --  355.0  --  360.0  --  415.0 466.0*   BAND  --   --   --   --  2  --   --   --    INR  --   --   --   --   --  1.18*  --   --     < > = values in this interval not displayed.        Recent Labs   Lab 07/16/21  0525 07/17/21  0441 07/18/ Streptococcus anginosus s/t PCN, repeat cxs NG x 5d  - urine cxs NG 24hrs  - afebrile >24h; WBC, Lactate both normalized   - ID consulted, plan discussed      # massive right incarcerated inguinal hernia which contains colon, small bowel, and a markedly th Recurrent DVT/PE  - xarelto, held for anemia     # LE wounds   - wound care  - pt reports prior fall as well.      Dispo: med/surg; >2 mdn anticipated prior to dc      Questions/concerns were discussed with patient by bedside.      Heather Garay MD  [facility]

## 2021-07-21 NOTE — PROGRESS NOTES
[facility]                INFECTIOUS DISEASE PROGRESS NOTE    [address] [facility] Patient Status:  Inpatient    1960 MRN [id number]   [facility] 3NW-A Attending Guicho Reyes MD   [address] Day # 8 PCP Ashok Lizarraga DO 07/19/21  0601 07/21/21  0722   * 179* 87   BUN 30* 29* 25*   CREATSERUM 0.81 1.05 0.87   GFRAA 111 88 108   GFRNAA 96 76 93   CA 8.1* 8.0* 8.4*   ALB 1.5* 1.5* 1.7*    142 142   K 4.0 3.9 4.3   * 113* 112   CO2 23.0 23.0 24.0   ALKPHO 8 ([facility]     Cellulitis of left foot     Urinary retention     Urinary tract infection with hematuria, site unspecified            ASSESSMENT/PLAN:  1.  Urinary retention in association with large inguinal hernia, massive scrotal swelling  -catheter placed 7/1

## 2021-07-21 NOTE — PLAN OF CARE
Pt alert and oriented to person, place, situation, disoriented to time. Pt sitting up in chair. Able to stand and ambulate small amounts with walker and SB assist. NPO, accucheck q6.  Newsome catheter draining tea- dark tea color, no clots noted, urology ABHILASH myles Evaluate fluid balance, assess for edema, trend weights  Outcome: Progressing     Problem: SKIN/TISSUE INTEGRITY - ADULT  Goal: Skin integrity remains intact  Description: INTERVENTIONS  - Assess and document risk factors for pressure ulcer development  - range  - Assess barriers to adequate nutritional intake and initiate nutrition consult as needed  - Instruct patient on self management of diabetes  Outcome: Progressing

## 2021-07-21 NOTE — ANESTHESIA PROCEDURE NOTES
Airway  Date/Time: 7/21/2021 12:39 PM  Urgency: elective      General Information and Staff    Patient location during procedure: OR  Anesthesiologist: Elton Logan DO  Performed: anesthesiologist     Indications and Patient Condition  Indications f

## 2021-07-21 NOTE — OPERATIVE REPORT
OPERATIVE REPORT    PATIENT NAME: Raymond Ronquillo OF BIRTH: 7/18/1960  DATE OF SERVICE: 7/21/2021    SURGEON:  Reid Chan MD    ASSISTANT:  None    PREOPERATIVE DIAGNOSIS:  Large inguinal hernia involving bladder, gross hematuria    POSTOPERATIV procedure back over to Dr. Javier Eason at this point. Please see his operative report for additional details. PLAN:  Continue CBI.        Marcella Ambrocio MD  [address] [facility]  Department of Urology  Office: [phone]

## 2021-07-21 NOTE — PLAN OF CARE
Patient A&Ox3, lethargic at the beginning of the shift. VSS, IVF and abx continued. Multiple large bowel movements, hanson tea colored to blood tinged. NPO initiated at midnight. Podiatry at bedside tonight, patient needs MRI done on left foot.  Dressing in hematoma  - Assess quality of pulses, skin color and temperature  - Assess for signs of decreased coronary artery perfusion - ex.  Angina  - Evaluate fluid balance, assess for edema, trend weights  7/21/2021 0242 by Cierra Del Cid RN  Outcome: Progressing Administer ordered medications to maintain glucose within target range  - Assess barriers to adequate nutritional intake and initiate nutrition consult as needed  - Instruct patient on self management of diabetes  7/21/2021 0242 by Shayy Treviño

## 2021-07-21 NOTE — CM/SW NOTE
Care Progression Note:  Active Acute Medical Issue:   Septic shock (HCC) d/t UTI, obstructive uropathy, bilat hydronephrosis, sp cysto with complex hanson cath placement  Admitted with scrotal edema to ICU  MUMTAZ and hypotensive  The hernia is incarcerated an

## 2021-07-21 NOTE — ANESTHESIA POSTPROCEDURE EVALUATION
[place] Patient Status:  Inpatient   Age/Gender 64year old male MRN [id number]   [facility] SURGERY Attending Gary Aguilar MD   [address] Day # 8 PCP Camila Cerda DO       Anesthesia Post-op Note    REPAIR OF

## 2021-07-21 NOTE — PROGRESS NOTES
Pt returned from surgery in stable condition. CBI running (as per Dr. Echeverria Bending note, continue to run CBI). Running at slow rate, urine clear/yellow without clots. Aquacel incision lower abd, C/D/I.  Scrotal edema noted, but with improvement from prior to surge

## 2021-07-21 NOTE — BRIEF OP NOTE
Pre-Operative Diagnosis: incarcerated right inguinal hernia     Post-Operative Diagnosis: incarcerated right inguinal hernia      Procedure Performed:   REPAIR OF INCARCERATED RIGHT INGUINAL HERNIA WITH MESH, SCROTAL EXPLORATION [facility]) AND BLADDER IRRIG

## 2021-07-21 NOTE — CONSULTS
[facility]  Report of Consultation    Fifi Vang Patient Status:  Inpatient    1960 MRN [id number]   [facility] 3NW-A Attending Archie Lopez MD   [facility] Day # 7 MARY Webb,      Date of Admission:   mg, 4 mg, Intravenous, Q6H PRN **OR** Ondansetron HCl (ZOFRAN) tab 4 mg, 4 mg, Oral, Q6H PRN  •  Phenazopyridine HCl (PYRIDIUM) tab 200 mg, 200 mg, Oral, TID PRN  •  Oxybutynin Chloride (DITROPAN) tab 5 mg, 5 mg, Oral, Q12H PRN  •  Newsome / urology care, , groups of bilateral lower extremities. Mild pain to palpation around the wound. Integument: Skin is warm and dry to the bilateral lower extremities. Ulceration distal left second digit measuring 0.6 x 0.3 x0.8cm.   Ulceration extends proximally with pus digit. See procedure noted above. Bedside incision and drainage performed  MRI order entered to evaluate for underling osteomyelitis  ID on consult, appreciate recs.   Abx: Unasyn  Nursing / wound care to continue daily dressing changes with bacitracin oin

## 2021-07-21 NOTE — PROGRESS NOTES
Pt off unit, transported to surgery with transporter and RN Zelpha Reveal in stable condition. Blood transfusion continued during transfer, OR notified of blood transfusion.

## 2021-07-21 NOTE — PROGRESS NOTES
[facility]  Progress Note    [address] [facility] Patient Status:  Inpatient    1960 MRN [id number]   [facility] 3NW-A Attending Teddy Cameron MD   Hosp Day # 8 PCP Kali Carson DO     Subjective:    Patient has had multiple both lower extremities (HCC)     Anxiety disorder     Severe alcohol use disorder (HCC)     DCM (dilated cardiomyopathy) (HCC)     Chronic systolic CHF (congestive heart failure) (HCC)     Hx of pulmonary embolus     Obesity, morbid (HCC)     Vitreous hemo

## 2021-07-21 NOTE — PROGRESS NOTES
[facility]  Progress Note    Miah Oleary [facility] Patient Status:  Inpatient    1960 MRN [id number]   [facility] 3NW-A Attending Margarito Granados MD   [facility] Day # 8 PCP Hai Sánchez DO     ADMISSION DATE AND TIME: 2021 Immature Granulocyte % 1.9 %   ABORH (Blood Type)    Collection Time: 07/20/21  9:47 AM   Result Value Ref Range    ABO BLOOD TYPE A     RH BLOOD TYPE Positive    Antibody Screen    Collection Time: 07/20/21  9:47 AM   Result Value Ref Range    Antibody Sc old male with incarcerated hernia and recent sepsis seen for anemia.      1.  Anemia, normocytic normochromic      Had mild anemia pre admission ~ 12 but now hemoglobin decreased to 7 range  Had renal failure and sepsis and these likely contributed the wors

## 2021-07-21 NOTE — PROGRESS NOTES
[facility]  Urology Progress Note    Leandro Crew Patient Status:  Inpatient    1960 MRN [id number]   [facility] 3NW-A Attending Nisha Guillory MD   [facility] Day # 8 PCP Kat Ennis DO     Subjective:  Carmin Cogan hydronephrosis and mild to moderate left hydronephrosis is likely stable from recent prior CT accounting for differences in technique.  There is a Newsome catheter within the bladder which is decompressed, limiting evaluation.      Bladder US 7/19/21: The bl

## 2021-07-22 ENCOUNTER — APPOINTMENT (OUTPATIENT)
Dept: MRI IMAGING | Facility: HOSPITAL | Age: 61
DRG: 853 | End: 2021-07-22
Attending: PODIATRIST
Payer: MEDICARE

## 2021-07-22 LAB
ANION GAP SERPL CALC-SCNC: 5 MMOL/L (ref 0–18)
BLOOD TYPE BARCODE: 6200
BUN BLD-MCNC: 29 MG/DL (ref 7–18)
BUN/CREAT SERPL: 27.1 (ref 10–20)
CALCIUM BLD-MCNC: 8.2 MG/DL (ref 8.5–10.1)
CHLORIDE SERPL-SCNC: 114 MMOL/L (ref 98–112)
CO2 SERPL-SCNC: 23 MMOL/L (ref 21–32)
CREAT BLD-MCNC: 1.07 MG/DL
DEPRECATED RDW RBC AUTO: 56.2 FL (ref 35.1–46.3)
ERYTHROCYTE [DISTWIDTH] IN BLOOD BY AUTOMATED COUNT: 18 % (ref 11–15)
GLUCOSE BLD-MCNC: 182 MG/DL (ref 70–99)
GLUCOSE BLD-MCNC: 187 MG/DL (ref 70–99)
GLUCOSE BLD-MCNC: 191 MG/DL (ref 70–99)
GLUCOSE BLD-MCNC: 211 MG/DL (ref 70–99)
GLUCOSE BLD-MCNC: 230 MG/DL (ref 70–99)
HCT VFR BLD AUTO: 30.5 %
HGB BLD-MCNC: 9.4 G/DL
MCH RBC QN AUTO: 27.6 PG (ref 26–34)
MCHC RBC AUTO-ENTMCNC: 30.8 G/DL (ref 31–37)
MCV RBC AUTO: 89.4 FL
OSMOLALITY SERPL CALC.SUM OF ELEC: 306 MOSM/KG (ref 275–295)
PLATELET # BLD AUTO: 524 10(3)UL (ref 150–450)
POTASSIUM SERPL-SCNC: 5.1 MMOL/L (ref 3.5–5.1)
RBC # BLD AUTO: 3.41 X10(6)UL
SODIUM SERPL-SCNC: 142 MMOL/L (ref 136–145)
WBC # BLD AUTO: 25.2 X10(3) UL (ref 4–11)

## 2021-07-22 PROCEDURE — 80048 BASIC METABOLIC PNL TOTAL CA: CPT | Performed by: PHYSICIAN ASSISTANT

## 2021-07-22 PROCEDURE — 73718 MRI LOWER EXTREMITY W/O DYE: CPT | Performed by: PODIATRIST

## 2021-07-22 PROCEDURE — 82962 GLUCOSE BLOOD TEST: CPT

## 2021-07-22 PROCEDURE — 85027 COMPLETE CBC AUTOMATED: CPT | Performed by: SURGERY

## 2021-07-22 NOTE — PROGRESS NOTES
[facility]  Urology Progress Note    201 [facility] Patient Status:  Inpatient    1960 MRN [id number]   [facility] 3NW-A Attending Jasiel Mercado MD   [facility] Day # 9 PCP Deidra Fermin DO     Subjective:  [address] [facility] consultation for scrotal exploration and bladder irrigation (Dr. Andreia Newberry) on 7/21/21  Afebrile  WBC 25.2  Hgb 9.4  Urine culture 7/13/21: no growth     2/2 Blood cultures 7/13/21: Streptococcus anginosus     2/2 blood cultures 7/14/21: no growth after 5 days

## 2021-07-22 NOTE — PROGRESS NOTES
Bilateral leg dressings changed per order. Pt tolerated well. Xeroform, abd pad, kerlix roll C/D/I. Writing RN attempted to notify [facility] podiatry regarding pt's MRI results x2 and unable to get a hold of podiatry. Will attempt this afternoon.

## 2021-07-22 NOTE — PROGRESS NOTES
[facility]                INFECTIOUS DISEASE PROGRESS NOTE    201 [facility] Patient Status:  Inpatient    1960 MRN [id number]   [facility] 3NW-A Attending Pallavi Elam MD   [facility] Day # 9 PCP Adeola Peña DO LYMPH 8  --   --   --   --    MON 4  --   --   --   --    EOS 2  --   --   --   --     < > = values in this interval not displayed.          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

## 2021-07-22 NOTE — PROGRESS NOTES
Pt to go for MRI of toe. Writing RN spoke with [facility] urology PA regarding the need to stop CBI for the MRI. Per [facility], can stop CBI and reconnect after patient returns. 2753- pt transported to MRI in stable condition.

## 2021-07-22 NOTE — OPERATIVE REPORT
[facility]    PATIENT'S NAME: Si Friendly   ATTENDING PHYSICIAN: Geraldine Carter MD   OPERATING PHYSICIAN: Ana Lilia Shaw M.D.    PATIENT ACCOUNT#:   [de-identified]    LOCATION:  [address] [place] [address]  MEDICAL RECORD #:   [id number]       DATE OF BIR deep into the scrotum. I carefully dissected this hernia sac completely off the cord structures and  it off the direct hernia, which was his bladder. After this was dissected free, I opened the hernia sac.   I excised the excess sac, inspected th taken to recovery room with his Newsome in place and his drain in position with a sterile dressing. He tolerated the procedure well, and he was stable to recovery room.     Dictated By Jovan Cruz M.D.  d: 07/21/2021 15:33:55  t: 07/21/2021 22:42:41  Mignon Elizondo

## 2021-07-22 NOTE — PLAN OF CARE
Patient drowsy and sleepy for most of night. Complains of pain treated with dilaudid. Mild nausea, zofran x 1 given. Some sips of clears overnight. Belching. No flatus or bms. Newsome w/ CBI intact, Clear yellow urine.   Gab drain intact, bloody drainag

## 2021-07-22 NOTE — DIETARY NOTE
[facility]     Admitting diagnosis:  Urinary retention [R33.9]  MUMTAZ (acute kidney injury) ([facility] [address] [N17.9]  Cellulitis of left foot [L03.116]  Urinary tract infection with hematuria, site unspecified [N39.0, R31

## 2021-07-22 NOTE — PLAN OF CARE
Pt alert and oriented x4. Forgetful at times. Pt returned from MRI in stable condition. CBI restarted, urine yellow, cloudy. Writing RN notified ID MD regarding pt's WBC count. IV abx infusing per mar, fluids infusing per mar.  Clear liquid diet, accucheck for bleeding, hypotension and signs of decreased cardiac output  - Evaluate effectiveness of vasoactive medications to optimize hemodynamic stability  - Monitor arterial and/or venous puncture sites for bleeding and/or hematoma  - Assess quality of pulses, Problem: METABOLIC/FLUID AND ELECTROLYTES - ADULT  Goal: Glucose maintained within prescribed range  Description: INTERVENTIONS:  - Monitor Blood Glucose as ordered  - Assess for signs and symptoms of hyperglycemia and hypoglycemia  - Administer ordered

## 2021-07-22 NOTE — PROGRESS NOTES
cbi continues, output is clear yellow. Podiatry informed of mri results, will see tonight per primary md.r chaudhari with serosanguenous output.  No distress

## 2021-07-22 NOTE — PROGRESS NOTES
[facility]  Progress Note    [facility] Patient Status:  Inpatient    1960 MRN [id number]   [facility] 3NW-A Attending Julissa Kelly MD   [facility] Day # 9 PCP Gatito Tanner DO     ADMISSION DATE AND TIME: 2021 Ref Range    Phosphorus 2.9 2.5 - 4.9 mg/dL   Haptoglobin    Collection Time: 07/21/21  7:22 AM   Result Value Ref Range    Haptoglobin 374.0 (H) 30.0 - 200.0 mg/dL   CBC W/ DIFFERENTIAL    Collection Time: 07/21/21  7:23 AM   Result Value Ref Range    WBC x10(3) uL    RBC 3.41 (L) 4.30 - 5.70 x10(6)uL    HGB 9.4 (L) 13.0 - 17.5 g/dL    HCT 30.5 (L) 39.0 - 53.0 %    .0 (H) 150.0 - 450.0 10(3)uL    MCV 89.4 80.0 - 100.0 fL    MCH 27.6 26.0 - 34.0 pg    MCHC 30.8 (L) 31.0 - 37.0 g/dL    RDW 18.0 (H) 11. oral rivaroxaban at discharge     We will follow as needed            Thank you for allowing me to participate in the care of your patient.  Priti Umana MD

## 2021-07-22 NOTE — PROGRESS NOTES
[facility] Cardiology Progress Note - [facility] Patient Status:  Inpatient    1960 MRN [id number]   [facility] 3NW-A Attending Uriel Valdovinos MD   [facility] Day # 9 PCP Lottie Flores DO     Subjective:  Co pulmonale (HCC)     Sepsis secondary to UTI ([facility] [address]     Septic shock (HCC)     [facility]) [facility]     Cellulitis of left foot     Urinary retention     Urinary tract infection with hematuria, site unspecified      Objective:   Temp: 98.6 °F (37 interval not displayed.        Recent Labs   Lab 07/16/21  0525 07/16/21  0525 07/17/21  0441 07/18/21  0510 07/19/21  0601 07/21/21  0722 07/22/21  0721      < > 144 142 142 142 142   K 3.7   < > 3.6 4.0 3.9 4.3 5.1   *   < > 115* 114* 113* 112 (DITROPAN) tab 5 mg, 5 mg, Oral, Q12H PRN  sodium chloride 0.9 % irrigation 3,000 mL, 3,000 mL, Irrigation, Continuous  metoprolol succinate (Toprol XL) 24 hr tab 25 mg, 25 mg, Oral, Daily Beta Blocker  Ampicillin-Sulbactam Sodium (UNASYN) 3 g in sodium ch

## 2021-07-22 NOTE — PROGRESS NOTES
[facility] Hospitalist Progress Note     PCP: Sharon Alvarez DO    Chief Complaint: follow-up    Overnight/Interim Events:    POD#1 s/p large incarcerated hernia repair   WILMER overnight  Afebrile, HDS  MRI foot shoes toe abscess, no e/o osteomyelitis     No n 7.5*  --  7.4* 8.0* 9.4*   MCV 88.1  --   --  87.7  --  90.0 91.7 89.4   .0  --   --  360.0  --  415.0 466.0* 524.0*   BAND  --   --   --  2  --   --   --   --    INR  --   --   --   --  1.18*  --   --   --     < > = values in this interval not disp glucose **OR** Glucose-Vitamin C **OR** dextrose **OR** glucose **OR** Glucose-Vitamin C, acetaminophen       Assessment/Plan:        61year old male with PMH including but not limited to DM, HTN, CHF, obesity, PE, who p/t [facility] ED c weakness/lethargy.     aortic stenosis  - apprec cards recs for optimization p/t OR  - echo 7/20: EF 25-30% w/ mod-severe diffuse hypokinesis, grade 2 diastolic dysfunction   - follow lytes     # L foot 2nd toe lesion/eschar  - pt reports stubbed toe on bed and previous pus  - c

## 2021-07-22 NOTE — CM/SW NOTE
VERN list from [facility] left at pt's bedside, pt off unit. MSW will f/u with pt to review for choice. (pending pt is agreeable to vern). MSW called pt's son to discuss post dc needs. Son in agreement with VERN. Pt is from home alone.   VERN list sent to son via

## 2021-07-23 LAB
ANION GAP SERPL CALC-SCNC: 2 MMOL/L (ref 0–18)
BASOPHILS # BLD AUTO: 0.1 X10(3) UL (ref 0–0.2)
BASOPHILS NFR BLD AUTO: 0.6 %
BUN BLD-MCNC: 27 MG/DL (ref 7–18)
BUN/CREAT SERPL: 29.7 (ref 10–20)
CALCIUM BLD-MCNC: 8.2 MG/DL (ref 8.5–10.1)
CHLORIDE SERPL-SCNC: 113 MMOL/L (ref 98–112)
CO2 SERPL-SCNC: 24 MMOL/L (ref 21–32)
CREAT BLD-MCNC: 0.91 MG/DL
DEPRECATED RDW RBC AUTO: 57 FL (ref 35.1–46.3)
EOSINOPHIL # BLD AUTO: 0.07 X10(3) UL (ref 0–0.7)
EOSINOPHIL NFR BLD AUTO: 0.4 %
ERYTHROCYTE [DISTWIDTH] IN BLOOD BY AUTOMATED COUNT: 17.9 % (ref 11–15)
GLUCOSE BLD-MCNC: 159 MG/DL (ref 70–99)
GLUCOSE BLD-MCNC: 164 MG/DL (ref 70–99)
GLUCOSE BLD-MCNC: 176 MG/DL (ref 70–99)
GLUCOSE BLD-MCNC: 180 MG/DL (ref 70–99)
GLUCOSE BLD-MCNC: 195 MG/DL (ref 70–99)
HCT VFR BLD AUTO: 24.7 %
HGB BLD-MCNC: 7.5 G/DL
IMM GRANULOCYTES # BLD AUTO: 0.13 X10(3) UL (ref 0–1)
IMM GRANULOCYTES NFR BLD: 0.7 %
LYMPHOCYTES # BLD AUTO: 1.28 X10(3) UL (ref 1–4)
LYMPHOCYTES NFR BLD AUTO: 7.2 %
MCH RBC QN AUTO: 27.3 PG (ref 26–34)
MCHC RBC AUTO-ENTMCNC: 30.4 G/DL (ref 31–37)
MCV RBC AUTO: 89.8 FL
MONOCYTES # BLD AUTO: 1.84 X10(3) UL (ref 0.1–1)
MONOCYTES NFR BLD AUTO: 10.4 %
NEUTROPHILS # BLD AUTO: 14.26 X10 (3) UL (ref 1.5–7.7)
NEUTROPHILS # BLD AUTO: 14.26 X10(3) UL (ref 1.5–7.7)
NEUTROPHILS NFR BLD AUTO: 80.7 %
OSMOLALITY SERPL CALC.SUM OF ELEC: 297 MOSM/KG (ref 275–295)
PLATELET # BLD AUTO: 471 10(3)UL (ref 150–450)
POTASSIUM SERPL-SCNC: 4.4 MMOL/L (ref 3.5–5.1)
RBC # BLD AUTO: 2.75 X10(6)UL
SODIUM SERPL-SCNC: 139 MMOL/L (ref 136–145)
WBC # BLD AUTO: 17.7 X10(3) UL (ref 4–11)

## 2021-07-23 PROCEDURE — 82962 GLUCOSE BLOOD TEST: CPT

## 2021-07-23 PROCEDURE — 80048 BASIC METABOLIC PNL TOTAL CA: CPT | Performed by: HOSPITALIST

## 2021-07-23 PROCEDURE — 85025 COMPLETE CBC W/AUTO DIFF WBC: CPT | Performed by: HOSPITALIST

## 2021-07-23 RX ORDER — FUROSEMIDE 20 MG/1
20 TABLET ORAL DAILY
Status: DISCONTINUED | OUTPATIENT
Start: 2021-07-23 | End: 2021-07-27

## 2021-07-23 NOTE — PROGRESS NOTES
[facility]  Progress Note    201 [facility] Patient Status:  Inpatient    1960 MRN [id number]   [facility] 3NW-A Attending Donny Villela MD   Hosp Day # 10 PCP Chata Valenzuela DO     Subjective:    Patient reports pain con Urinary tract infection with hematuria, site unspecified      Impression:     63 y/o S/P:  Right scrotal exploration, reduction of small bowel, colon and bladder, repair of abdominal wall hernia with mesh, intraoperative urology consultation.  -tolerating

## 2021-07-23 NOTE — PROGRESS NOTES
[facility] Hospitalist Progress Note     PCP: Art Navarrete DO    Chief Complaint: follow-up    Overnight/Interim Events:    POD#2 s/p large incarcerated hernia repair   WILMER overnight  Afebrile, HDS  Right second toe I&D'd at bedside by podiatry, well ivan 25.2*   HGB 7.6*   < > 7.8* 7.5*  --  7.4* 8.0* 9.4*   MCV 88.1  --   --  87.7  --  90.0 91.7 89.4   .0  --   --  360.0  --  415.0 466.0* 524.0*   BAND  --   --   --  2  --   --   --   --    INR  --   --   --   --  1.18*  --   --   --     < > = valu DM, HTN, CHF, obesity, PE, who p/t [facility] ED c weakness/lethargy.      # weakness/lethargy  - 2/2 sepsis  - improving, PT/OT      # Septic shock 2/2 UTI -- resolved   - pressors weaned off   - apprec  recs; currently on CBI   - ID consulted, plan discussed lesion/eschar  - pt reports stubbed toe on bed and previous pus  - c/s [facility] podiatry, plan discussed   - 7/22: MRI shows left toe abscess, no obvious evidence of osteomyelitis  - likely bedside debridement later today   - currently on abx per above     # Ma

## 2021-07-23 NOTE — PROGRESS NOTES
[facility] Cardiology Progress Note - [facility] Patient Status:  Inpatient    1960 MRN [id number]   [facility] 3NW-A Attending Debra Lock MD   Hosp Day # 10 PCP Ashok Lizarraga, DO     Subjective:  F ([facility] [address]     [facility]) [facility]     Cellulitis of left foot     Urinary retention     Urinary tract infection with hematuria, site unspecified      Objective:   Temp: 98 °F (36.7 °C)  Pulse: 88  Resp: 18  BP: 136/66    Intake/Output:     Intake/O 3.9 4.3 5.1 4.4   *   < > 114* 113* 112 114* 113*   CO2 25.0   < > 23.0 23.0 24.0 23.0 24.0   BUN 34*   < > 30* 29* 25* 29* 27*   CREATSERUM 1.00   < > 0.81 1.05 0.87 1.07 0.91   CA 7.6*   < > 8.1* 8.0* 8.4* 8.2* 8.2*   MG 1.7  --  1.9 1.9 2.1  -- g in sodium chloride 0.9% 100 mL IVPB-ADDV, 3 g, Intravenous, Q8H  lactated ringers infusion, , Intravenous, Continuous  Midodrine HCl (PROAMATINE) tab 10 mg, 10 mg, Oral, TID  famoTIDine (PEPCID) tab 20 mg, 20 mg, Oral, BID  Sertraline HCl (ZOLOFT) tab 50

## 2021-07-23 NOTE — CM/SW NOTE
MSW attempted to see pt, he was a sleep.     Barrier to [employer]:  Medical clearance  JOANNA Choice and insurance auth

## 2021-07-23 NOTE — PROGRESS NOTES
Pt is Ax4, vvs, afebrile, /Tele, A/C QID, IVF infusing, tolerating Clear/ADA diet but poor appetite, c/o constipation, abdomen soft/non-distended, audible bowel sounds, c/o nausea after taking PO medications but no vomiting.  CBI running slowly, output i

## 2021-07-23 NOTE — PROGRESS NOTES
[facility]                INFECTIOUS DISEASE PROGRESS NOTE    201 [facility] Patient Status:  Inpatient    1960 MRN [id number]   [facility] 3NW-A Attending Rufina Garvey MD   [address] Day # 8 PCP Kt Guevara DO 07/18/21  0510 07/19/21  0601 07/19/21  0601 07/21/21  0722 07/22/21  0721 07/23/21  0853   *   < > 179*   < > 87 211* 164*   BUN 30*   < > 29*   < > 25* 29* 27*   CREATSERUM 0.81   < > 1.05   < > 0.87 1.07 0.91   GFRAA 111   < > 88   < > 108 86 105 Tachycardia     Elevated INR     Renal insufficiency     Constipation, unspecified constipation type     Elevated troponin     Type 2 diabetes mellitus with hyperglycemia, without long-term current use of insulin (HCC)     Recurrent acute deep vein thrombo

## 2021-07-23 NOTE — PROGRESS NOTES
[facility]  Consult Note    201 [facility] Patient Status:  Inpatient    1960 MRN [id number]   [facility] 3NW-A Attending Verónica Thomas MD   [facility] Day # 9 PCP Elena Cason DO       Subjective:  Pt seen at bedside th mg, 0.4 mg, Intravenous, Q2H PRN **OR** HYDROmorphone HCl (DILAUDID) 1 MG/ML injection 0.8 mg, 0.8 mg, Intravenous, Q2H PRN  •  docusate sodium (COLACE) cap 100 mg, 100 mg, Oral, BID  •  ondansetron HCl (ZOFRAN) injection 4 mg, 4 mg, Intravenous, Q6H PRN * pedis 1+, posterior tibial 1+  CFT intact to digits. Musculoskeletal: Muscle strength is 5 out of 5 all major muscle groups of bilateral lower extremities. Mild pain to palpation around the wound.   Integument: Skin is warm and dry to the bilateral lower TECHNIQUE:  A variety of imaging planes and parameters were utilized for visualization of suspected pathology.  Images were performed without contrast.       PATIENT STATED HISTORY: (As transcribed by Technologist)  Patient hit foot on bunk bed.  Per ord at bedside. Reviewed vitals and labs  Reviewed Xray left foot as noted above, no acute osseous abnormality to left second digit. MRI results as above.   No signs of aniyah osteomyelitis on MRI however clinical suspicion remains high as wound does probe to

## 2021-07-23 NOTE — PLAN OF CARE
Patient A/O X 4, VSS, diet advanced to full liquids. Accucheck qid. Patient turned. CBI infusing at slow rate, urine clear yellow with no clots. Scrotum elevated on towel, d/t swelling. Aquacel dressing removed per order, staples CDI no drainage.  Wiped wit Assess and document risk factors for pressure ulcer development  - Assess and document skin integrity  - Monitor for areas of redness and/or skin breakdown  - Initiate interventions, skin care algorithm/standards of care as needed  Outcome: Progressing

## 2021-07-23 NOTE — PHYSICAL THERAPY NOTE
IP PT attempted, RN to clarify WB on LLE. Podiatrist performed BS I&D. Per podiatry,clinical suspician for osteomyelitis remains high. RN to confirm WB status and whether pt requires surgical shoe for mobiity.

## 2021-07-23 NOTE — PROGRESS NOTES
[facility]  Urology Progress Note    Peggy [facility] Patient Status:  Inpatient    1960 MRN [id number]   [facility] 3NW-A Attending José Manuel Camacho MD   [facility] Day # 10 PCP Caleb Mariano DO     Subjective:  Peggy [facility]), intraoperative urology consultation for scrotal exploration and bladder irrigation (Dr. Mitzy Hoskins) on 7/21/21  AF, HD stable  Repeat AM labs pending  Urine culture 7/13/21: no growth    Plan:    Await final cultures, abx per ID  Repeat labs pending

## 2021-07-24 LAB
ANION GAP SERPL CALC-SCNC: 5 MMOL/L (ref 0–18)
BASOPHILS # BLD AUTO: 0.13 X10(3) UL (ref 0–0.2)
BASOPHILS NFR BLD AUTO: 0.9 %
BUN BLD-MCNC: 23 MG/DL (ref 7–18)
BUN/CREAT SERPL: 28.8 (ref 10–20)
CALCIUM BLD-MCNC: 8.3 MG/DL (ref 8.5–10.1)
CHLORIDE SERPL-SCNC: 112 MMOL/L (ref 98–112)
CO2 SERPL-SCNC: 25 MMOL/L (ref 21–32)
CREAT BLD-MCNC: 0.8 MG/DL
DEPRECATED RDW RBC AUTO: 58.9 FL (ref 35.1–46.3)
EOSINOPHIL # BLD AUTO: 0.15 X10(3) UL (ref 0–0.7)
EOSINOPHIL NFR BLD AUTO: 1.1 %
ERYTHROCYTE [DISTWIDTH] IN BLOOD BY AUTOMATED COUNT: 17.9 % (ref 11–15)
GLUCOSE BLD-MCNC: 117 MG/DL (ref 70–99)
GLUCOSE BLD-MCNC: 125 MG/DL (ref 70–99)
GLUCOSE BLD-MCNC: 138 MG/DL (ref 70–99)
GLUCOSE BLD-MCNC: 146 MG/DL (ref 70–99)
GLUCOSE BLD-MCNC: 161 MG/DL (ref 70–99)
HCT VFR BLD AUTO: 24.2 %
HGB BLD-MCNC: 7.1 G/DL
IMM GRANULOCYTES # BLD AUTO: 0.09 X10(3) UL (ref 0–1)
IMM GRANULOCYTES NFR BLD: 0.7 %
LYMPHOCYTES # BLD AUTO: 1.38 X10(3) UL (ref 1–4)
LYMPHOCYTES NFR BLD AUTO: 10 %
MCH RBC QN AUTO: 26.7 PG (ref 26–34)
MCHC RBC AUTO-ENTMCNC: 29.3 G/DL (ref 31–37)
MCV RBC AUTO: 91 FL
MONOCYTES # BLD AUTO: 1.46 X10(3) UL (ref 0.1–1)
MONOCYTES NFR BLD AUTO: 10.6 %
NEUTROPHILS # BLD AUTO: 10.53 X10 (3) UL (ref 1.5–7.7)
NEUTROPHILS # BLD AUTO: 10.53 X10(3) UL (ref 1.5–7.7)
NEUTROPHILS NFR BLD AUTO: 76.7 %
OSMOLALITY SERPL CALC.SUM OF ELEC: 299 MOSM/KG (ref 275–295)
PLATELET # BLD AUTO: 425 10(3)UL (ref 150–450)
POTASSIUM SERPL-SCNC: 4.5 MMOL/L (ref 3.5–5.1)
RBC # BLD AUTO: 2.66 X10(6)UL
SODIUM SERPL-SCNC: 142 MMOL/L (ref 136–145)
WBC # BLD AUTO: 13.7 X10(3) UL (ref 4–11)

## 2021-07-24 PROCEDURE — 97530 THERAPEUTIC ACTIVITIES: CPT

## 2021-07-24 PROCEDURE — 97116 GAIT TRAINING THERAPY: CPT

## 2021-07-24 PROCEDURE — 85025 COMPLETE CBC W/AUTO DIFF WBC: CPT | Performed by: HOSPITALIST

## 2021-07-24 PROCEDURE — 82962 GLUCOSE BLOOD TEST: CPT

## 2021-07-24 PROCEDURE — 80048 BASIC METABOLIC PNL TOTAL CA: CPT | Performed by: HOSPITALIST

## 2021-07-24 RX ORDER — MIDODRINE HYDROCHLORIDE 5 MG/1
5 TABLET ORAL
Status: DISCONTINUED | OUTPATIENT
Start: 2021-07-24 | End: 2021-07-27

## 2021-07-24 NOTE — PROGRESS NOTES
[facility]  Consult Note    201 [facility] Patient Status:  Inpatient    1960 MRN [id number]   [facility] 3NW-A Attending Sara Ambrocio MD   [address] Day # 11 PCP Ramonita Perry DO       Subjective:  Pt seen at bedside. (DILAUDID) 1 MG/ML injection 0.4 mg, 0.4 mg, Intravenous, Q2H PRN **OR** HYDROmorphone HCl (DILAUDID) 1 MG/ML injection 0.8 mg, 0.8 mg, Intravenous, Q2H PRN  •  docusate sodium (COLACE) cap 100 mg, 100 mg, Oral, BID  •  ondansetron HCl (ZOFRAN) injection 4 strength is 5 out of 5 all major muscle groups of bilateral lower extremities. Mild pain to palpation around the wound. Integument: Skin is warm and dry to the bilateral lower extremities.   Ulceration distal left second digit measuring 0.6 x 0.3 x0.8cm w 1st and 3rd tarsometatarsal joints.  There is mild to moderate osteoarthritis of the 1st MTP joint.  There is no evidence to suggest the presence of   osteomyelitis.    SOFT TISSUES:  As per clinical history, there is evidence of an ovoid high T2 soft tissu dressing. Nursing / wound care to continue daily dressing changes with betadine to wound site and dry dressing. Anticipate continued improvement with antibiotics and local wound care.    All questions answered to patient's satisfaction  Will sign off.  F/

## 2021-07-24 NOTE — PROGRESS NOTES
[facility] Hospitalist Progress Note     PCP: Marsh Shone, DO    Chief Complaint: follow-up    SUBJECTIVE:    No chest pain, palpitations, shortness of breath, cough, nausea, vomiting, abdominal pain. Patient weak.     OBJECTIVE:  Temp:  [97.9 °F (36.6 °C)- 23.0 24.0 25.0   BUN 30*   < > 29* 25* 29* 27* 23*   CREATSERUM 0.81   < > 1.05 0.87 1.07 0.91 0.80   CA 8.1*   < > 8.0* 8.4* 8.2* 8.2* 8.3*   MG 1.9  --  1.9 2.1  --   --   --    PHOS 2.5  --  2.8 2.9  --   --   --    *   < > 179* 87 211* 164* 117* small bowel, and a markedly thick wall bladder  - surgery following --> POD # 3 S/P:  Right scrotal exploration, reduction of small bowel, colon and bladder, repair of abdominal wall hernia with mesh, intraoperative urology consultation for scrotal explora wound and dressed with dry dressing.  - currently on abx per above     # Major Depression/ Generalized anxiety d/o   -reviewed home meds, continue SSRI currently appears stable     #hypomagnesium  # hypophos  -replete per protocol    # Recurrent DVT/PE  -

## 2021-07-24 NOTE — PROGRESS NOTES
[facility] Cardiology Progress Note - [facility] Patient Status:  Inpatient    1960 MRN [id number]   [facility] 3NW-A Attending Chely Russo MD   [facility] Day # 6 PCP Armin Pineda DO     Subjective:No left foot     Urinary retention     Urinary tract infection with hematuria, site unspecified      Objective:   Temp: 98 °F (36.7 °C)  Pulse: 106  Resp: 18  BP: 134/76    Intake/Output:     Intake/Output Summary (Last 24 hours) at 7/24/2021 1140  Last data 07/19/21  0601 07/21/21  0722 07/22/21  0721 07/23/21  0853 07/24/21  0604      < > 142 142 142 139 142   K 4.0   < > 3.9 4.3 5.1 4.4 4.5   *   < > 113* 112 114* 113* 112   CO2 23.0   < > 23.0 24.0 23.0 24.0 25.0   BUN 30*   < > 29* 25* 29* 27* tab 25 mg, 25 mg, Oral, Daily Beta Blocker  Ampicillin-Sulbactam Sodium (UNASYN) 3 g in sodium chloride 0.9% 100 mL IVPB-ADDV, 3 g, Intravenous, Q8H  Midodrine HCl (PROAMATINE) tab 10 mg, 10 mg, Oral, TID  famoTIDine (PEPCID) tab 20 mg, 20 mg, Oral, BID  S

## 2021-07-24 NOTE — PHYSICAL THERAPY NOTE
PHYSICAL THERAPY TREATMENT NOTE - INPATIENT    Room Number: 324/324-A     Session: 2  Number of Visits to Meet Established Goals: 5    Presenting Problem: sepsis, UTI    Problem List  Principal Problem:    Septic shock ([address]  Active Problems:    Sepsis se A Lot   How much help from another person does the patient currently need. ..   -   Moving to and from a bed to a chair (including a wheelchair)?: Total   -   Need to walk in hospital room?: Total   -   Climbing 3-5 steps with a railing?: Total       AM-PAC gait/transfers resulting in downgrade of overall functional mobility. Due to above deficits, Pt will benefit from continued IP PT, so that patient may achieve highest functional independence/return to baseline.        DISCHARGE RECOMMENDATIONS  PT Discharg

## 2021-07-24 NOTE — PLAN OF CARE
PT VSS, AOX4. Pt denies TASNEEM, denies Lightheadedness, denies chest pain, denies calf pain. Practicing incentive spirometer with encouragement, achieving 500.  Pt nonproductive cough noted although he reports he does produce a bit \"here and there\" and its c Patient/Family Short Term Goal  Description: Patient's Short Term Goal: prepare for discharge    Interventions:   - Pt/OT consult  - See additional Care Plan goals for specific interventions  Outcome: Progressing     Problem: GENITOURINARY - ADULT  Goal: A blood products/factors as ordered and appropriate  Outcome: Progressing     Problem: Impaired Functional Mobility  Goal: Achieve highest/safest level of mobility/gait  Description: Interventions:  - Assess patient's functional ability and stability  - Prom

## 2021-07-24 NOTE — PLAN OF CARE
Problem: Patient/Family Goals  Goal: Patient/Family Long Term Goal  Description: Patient's Long Term Goal: discharge home    Interventions:  - sx 7/21  -back to ADL's  - See additional Care Plan goals for specific interventions  Outcome: Tolu Mcgowan

## 2021-07-24 NOTE — PROGRESS NOTES
[facility]                INFECTIOUS DISEASE PROGRESS NOTE    201 [facility] Patient Status:  Inpatient    1960 MRN [id number]   [facility] 3NW-A Attending Jennifer Ruby MD   [facility] Day # 6 PCP Loyd Grant DO 07/18/21  0510 07/19/21  0601 07/19/21  0601 07/21/21  5655 07/21/21  1862 07/22/21  0721 07/23/21  0853 07/24/21  0604   *   < > 179*   < > 87   < > 211* 164* 117*   BUN 30*   < > 29*   < > 25*   < > 29* 27* 23*   CREATSERUM 0.81   < > 1.05   < > 0 List:     Type 2 diabetes mellitus with stage 3a chronic kidney disease, without long-term current use of insulin (HCC)     Hypertension     Acute on chronic right-sided congestive heart failure (HCC)     Tachycardia     Elevated INR     Renal insufficienc

## 2021-07-24 NOTE — CM/SW NOTE
Followed up w/son regarding JOANNA choice. He stated he never received the JOANNA list. MERLE emailed JOANNA list to [email or web address]

## 2021-07-24 NOTE — PROGRESS NOTES
[facility]  Urology Progress Note    201 [facility] Patient Status:  Inpatient    1960 MRN [id number]   [facility] 3NW-A Attending Linda Colon MD   [facility] Day # 11 PCP Fariba Nath DO     Subjective:  201 [facility] wall hernia with mesh (Dr. Javier Eason), intraoperative urology consultation for scrotal exploration and bladder irrigation (Dr. Andreia Newberry) on 7/21/21  Ucx (7/13/21) = NGTD  Continue scrotal elevation  Continue Newsome catheter     BILATERAL HYDRONEPHROSIS  Likely s

## 2021-07-24 NOTE — PROGRESS NOTES
[facility]  Progress Note    201 [facility] Patient Status:  Inpatient    1960 MRN [id number]   [facility] 3NW-A Attending Catherine Lovett MD   [facility] Day # 6 PCP Brennon Jung DO     Subjective:    No complaints.   Pain con DCM (dilated cardiomyopathy) (HCC)     Chronic systolic CHF (congestive heart failure) (HCC)     Hx of pulmonary embolus     Obesity, morbid (HCC)     Vitreous hemorrhage of both eyes (HCC)     Proliferative diabetic retinopathy of both eyes without macu

## 2021-07-25 LAB
ANION GAP SERPL CALC-SCNC: 5 MMOL/L (ref 0–18)
BASOPHILS # BLD AUTO: 0.13 X10(3) UL (ref 0–0.2)
BASOPHILS NFR BLD AUTO: 1.1 %
BUN BLD-MCNC: 26 MG/DL (ref 7–18)
BUN/CREAT SERPL: 27.7 (ref 10–20)
CALCIUM BLD-MCNC: 8.1 MG/DL (ref 8.5–10.1)
CHLORIDE SERPL-SCNC: 112 MMOL/L (ref 98–112)
CO2 SERPL-SCNC: 25 MMOL/L (ref 21–32)
CREAT BLD-MCNC: 0.94 MG/DL
DEPRECATED RDW RBC AUTO: 59.7 FL (ref 35.1–46.3)
EOSINOPHIL # BLD AUTO: 0.21 X10(3) UL (ref 0–0.7)
EOSINOPHIL NFR BLD AUTO: 1.8 %
ERYTHROCYTE [DISTWIDTH] IN BLOOD BY AUTOMATED COUNT: 17.7 % (ref 11–15)
GLUCOSE BLD-MCNC: 141 MG/DL (ref 70–99)
GLUCOSE BLD-MCNC: 151 MG/DL (ref 70–99)
GLUCOSE BLD-MCNC: 165 MG/DL (ref 70–99)
GLUCOSE BLD-MCNC: 182 MG/DL (ref 70–99)
GLUCOSE BLD-MCNC: 205 MG/DL (ref 70–99)
HCT VFR BLD AUTO: 23.6 %
HGB BLD-MCNC: 7.1 G/DL
IMM GRANULOCYTES # BLD AUTO: 0.07 X10(3) UL (ref 0–1)
IMM GRANULOCYTES NFR BLD: 0.6 %
LYMPHOCYTES # BLD AUTO: 1.32 X10(3) UL (ref 1–4)
LYMPHOCYTES NFR BLD AUTO: 11.6 %
MCH RBC QN AUTO: 27.7 PG (ref 26–34)
MCHC RBC AUTO-ENTMCNC: 30.1 G/DL (ref 31–37)
MCV RBC AUTO: 92.2 FL
MONOCYTES # BLD AUTO: 1.26 X10(3) UL (ref 0.1–1)
MONOCYTES NFR BLD AUTO: 11.1 %
NEUTROPHILS # BLD AUTO: 8.38 X10 (3) UL (ref 1.5–7.7)
NEUTROPHILS # BLD AUTO: 8.38 X10(3) UL (ref 1.5–7.7)
NEUTROPHILS NFR BLD AUTO: 73.8 %
OSMOLALITY SERPL CALC.SUM OF ELEC: 302 MOSM/KG (ref 275–295)
PLATELET # BLD AUTO: 447 10(3)UL (ref 150–450)
POTASSIUM SERPL-SCNC: 5.1 MMOL/L (ref 3.5–5.1)
RBC # BLD AUTO: 2.56 X10(6)UL
SODIUM SERPL-SCNC: 142 MMOL/L (ref 136–145)
WBC # BLD AUTO: 11.4 X10(3) UL (ref 4–11)

## 2021-07-25 PROCEDURE — 80048 BASIC METABOLIC PNL TOTAL CA: CPT | Performed by: HOSPITALIST

## 2021-07-25 PROCEDURE — 82962 GLUCOSE BLOOD TEST: CPT

## 2021-07-25 PROCEDURE — 85025 COMPLETE CBC W/AUTO DIFF WBC: CPT | Performed by: HOSPITALIST

## 2021-07-25 NOTE — PROGRESS NOTES
[facility]  Progress Note    201 [facility] Patient Status:  Inpatient    1960 MRN [id number]   [facility] 3NW-A Attending Jasiel Mercado MD   [facility] Day # 12 PCP Deidra Fermin DO     Subjective:  [address] [facility] is a(n) hypertension ([facility] [address]     Multiple subsegmental pulmonary emboli without acute cor pulmonale (HCC)     Sepsis secondary to UTI ([address]     Septic shock (HCC)     MUMTAZ (acute kidney injury) ([address]     Cellulitis of left foot     Urinary retention     Urinary tract

## 2021-07-25 NOTE — PROGRESS NOTES
[facility]  Progress Note    201 [facility] Patient Status:  Inpatient    1960 MRN [id number]   [facility] 3NW-A Attending Sagar Shields MD   [facility] Day # 12 PCP Zeke Moeller DO     Subjective:    No new complaints.   Mikey cardiomyopathy) ([facility] [address]     Chronic systolic CHF (congestive heart failure) (HCC)     Hx of pulmonary embolus     Obesity, morbid (HCC)     Vitreous hemorrhage of both eyes (HCC)     Proliferative diabetic retinopathy of both eyes without macular edema assoc

## 2021-07-25 NOTE — PROGRESS NOTES
[facility] Cardiology Progress Note - [facility] Patient Status:  Inpatient    1960 MRN [id number]   [facility] 3NW-A Attending Jimmie Wynne MD   [facility] Day # 15 PCP Adeola Peña DO     Subjective:goo secondary to UTI ([facility]     Septic shock ([facility]     MUMTAZ (acute kidney injury) ([facility] [address]     Cellulitis of left foot     Urinary retention     Urinary tract infection with hematuria, site unspecified      Objective:   Temp: 98.5 °F (36.9 °C)  Pulse: 103  Resp: 18 < > = values in this interval not displayed.        Recent Labs   Lab 07/19/21  0601 07/19/21  0601 07/21/21  0722 07/22/21  0721 07/23/21  0853 07/24/21  0604 07/25/21  0507      < > 142 142 139 142 142   K 3.9   < > 4.3 5.1 4.4 4.5 5.1   *   < Oral, Q12H PRN  sodium chloride 0.9 % irrigation 3,000 mL, 3,000 mL, Irrigation, Continuous  metoprolol succinate (Toprol XL) 24 hr tab 25 mg, 25 mg, Oral, Daily Beta Blocker  Ampicillin-Sulbactam Sodium (UNASYN) 3 g in sodium chloride 0.9% 100 mL IVPB-ADD

## 2021-07-25 NOTE — PLAN OF CARE
Patient is alert and oriented. On room air. Tele with NSR/ ST with activity. Abdomen is soft/ non-distended. Patient states passing gas. Low transverse incision closed with staples-c/d/I. Cleansed incision with CHG cloth wipes.  Newsome catheter with clear/ y Assess for signs of decreased coronary artery perfusion - ex.  Angina  - Evaluate fluid balance, assess for edema, trend weights  Outcome: Progressing     Problem: SKIN/TISSUE INTEGRITY - ADULT  Goal: Skin integrity remains intact  Description: INTERVENTION hypoglycemia  - Administer ordered medications to maintain glucose within target range  - Assess barriers to adequate nutritional intake and initiate nutrition consult as needed  - Instruct patient on self management of diabetes  Outcome: Progressing

## 2021-07-25 NOTE — PROGRESS NOTES
Pt alert and orient x4, can be confused at times. cpox and telemetry in place. Transverse incision with staples shikha, c/d/i. Newsome draining yellow urine. cbi clamped. Pt tolerating soft diet. Iv fluids infusing.  Up with assist. acchecks qid. bilateral leg d

## 2021-07-26 LAB
ANION GAP SERPL CALC-SCNC: 3 MMOL/L (ref 0–18)
BASOPHILS # BLD AUTO: 0.16 X10(3) UL (ref 0–0.2)
BASOPHILS NFR BLD AUTO: 1.7 %
BUN BLD-MCNC: 25 MG/DL (ref 7–18)
BUN/CREAT SERPL: 30.5 (ref 10–20)
CALCIUM BLD-MCNC: 8.1 MG/DL (ref 8.5–10.1)
CHLORIDE SERPL-SCNC: 111 MMOL/L (ref 98–112)
CO2 SERPL-SCNC: 27 MMOL/L (ref 21–32)
CREAT BLD-MCNC: 0.82 MG/DL
DEPRECATED HBV CORE AB SER IA-ACNC: 121.4 NG/ML
DEPRECATED RDW RBC AUTO: 58.8 FL (ref 35.1–46.3)
EOSINOPHIL # BLD AUTO: 0.35 X10(3) UL (ref 0–0.7)
EOSINOPHIL NFR BLD AUTO: 3.8 %
ERYTHROCYTE [DISTWIDTH] IN BLOOD BY AUTOMATED COUNT: 17.4 % (ref 11–15)
GLUCOSE BLD-MCNC: 111 MG/DL (ref 70–99)
GLUCOSE BLD-MCNC: 116 MG/DL (ref 70–99)
GLUCOSE BLD-MCNC: 123 MG/DL (ref 70–99)
GLUCOSE BLD-MCNC: 127 MG/DL (ref 70–99)
GLUCOSE BLD-MCNC: 162 MG/DL (ref 70–99)
HCT VFR BLD AUTO: 23.9 %
HGB BLD-MCNC: 7 G/DL
IMM GRANULOCYTES # BLD AUTO: 0.06 X10(3) UL (ref 0–1)
IMM GRANULOCYTES NFR BLD: 0.6 %
IRON SATURATION: 9 %
IRON SERPL-MCNC: 19 UG/DL
LYMPHOCYTES # BLD AUTO: 1.23 X10(3) UL (ref 1–4)
LYMPHOCYTES NFR BLD AUTO: 13.2 %
MCH RBC QN AUTO: 26.9 PG (ref 26–34)
MCHC RBC AUTO-ENTMCNC: 29.3 G/DL (ref 31–37)
MCV RBC AUTO: 91.9 FL
MONOCYTES # BLD AUTO: 1.03 X10(3) UL (ref 0.1–1)
MONOCYTES NFR BLD AUTO: 11.1 %
NEUTROPHILS # BLD AUTO: 6.48 X10 (3) UL (ref 1.5–7.7)
NEUTROPHILS # BLD AUTO: 6.48 X10(3) UL (ref 1.5–7.7)
NEUTROPHILS NFR BLD AUTO: 69.6 %
OSMOLALITY SERPL CALC.SUM OF ELEC: 297 MOSM/KG (ref 275–295)
PLATELET # BLD AUTO: 427 10(3)UL (ref 150–450)
POTASSIUM SERPL-SCNC: 4.5 MMOL/L (ref 3.5–5.1)
RBC # BLD AUTO: 2.6 X10(6)UL
SODIUM SERPL-SCNC: 141 MMOL/L (ref 136–145)
TOTAL IRON BINDING CAPACITY: 203 UG/DL (ref 240–450)
TRANSFERRIN SERPL-MCNC: 136 MG/DL (ref 200–360)
WBC # BLD AUTO: 9.3 X10(3) UL (ref 4–11)

## 2021-07-26 PROCEDURE — 85025 COMPLETE CBC W/AUTO DIFF WBC: CPT | Performed by: HOSPITALIST

## 2021-07-26 PROCEDURE — 97530 THERAPEUTIC ACTIVITIES: CPT

## 2021-07-26 PROCEDURE — 83540 ASSAY OF IRON: CPT | Performed by: INTERNAL MEDICINE

## 2021-07-26 PROCEDURE — 97110 THERAPEUTIC EXERCISES: CPT

## 2021-07-26 PROCEDURE — 97116 GAIT TRAINING THERAPY: CPT

## 2021-07-26 PROCEDURE — 80048 BASIC METABOLIC PNL TOTAL CA: CPT | Performed by: HOSPITALIST

## 2021-07-26 PROCEDURE — 82962 GLUCOSE BLOOD TEST: CPT

## 2021-07-26 PROCEDURE — 97535 SELF CARE MNGMENT TRAINING: CPT

## 2021-07-26 PROCEDURE — 82728 ASSAY OF FERRITIN: CPT | Performed by: INTERNAL MEDICINE

## 2021-07-26 PROCEDURE — 83550 IRON BINDING TEST: CPT | Performed by: INTERNAL MEDICINE

## 2021-07-26 NOTE — PAYOR COMM NOTE
--------------  CONTINUED STAY REVIEW    Payor: Wendy Goldberg 673 #:  [id number]  Authorization Number: [id number]    Admit date: 7/13/21  Admit time: 10:15 AM    Admitting Physician: Keegan Em MD  Attending Physician:  Pasquale Murphy 7/26/2021 0600 Given 25 mg Oral Vicki Westfall RN      Midodrine HCl (PROAMATINE) tab 5 mg     Date Action Dose Route User    7/26/2021 0600 Given 5 mg Oral Valencia Mcbride RN    7/25/2021 1732 Given 5 mg Oral Claribel Mosley RN      nystatin (MYCOSTATIN) 1000 yesterday  7/24  Assessment:  Large right inguinal hernia with small bowel, colon, bladder; gross hematuria, urinary retention, bilateral hydronephrosis    Status-post cystoscopy, clot evacuation, cystogram, complex hanson catheter placement (7/19/21)  Stat abcess  -DM     Plan:  Patient seen and examined at bedside. Reviewed vitals and labs  Reviewed Xray left foot as noted above, no acute osseous abnormality to left second digit. MRI results as above. No signs of aniyah osteomyelitis on MRI.   ID on consul repair of right inguinal hernia     Plan:     Continue current care        [place] Naomy  7/25/2021            Electronically signed by Ana Laura Tony MD at 7/25/2021 10:10 AM  History of bladder involvement in very large [address] [facility]  S/P [address] [facility]

## 2021-07-26 NOTE — PROGRESS NOTES
[facility] Hospitalist Progress Note     PCP: Albesa Favre, DO    Chief Complaint: follow-up    SUBJECTIVE:    No chest pain, palpitations, shortness of breath, cough, nausea, vomiting, abdominal pain. Patient weak.     OBJECTIVE:  Temp:  [98 °F (36.7 °C)-98 CREATSERUM 0.87   < > 1.07 0.91 0.80 0.94 0.82   CA 8.4*   < > 8.2* 8.2* 8.3* 8.1* 8.1*   MG 2.1  --   --   --   --   --   --    PHOS 2.9  --   --   --   --   --   --    GLU 87   < > 211* 164* 117* 151* 111*    < > = values in this interval not displayed scrotal exploration, reduction of small bowel, colon and bladder, repair of abdominal wall hernia with mesh, intraoperative urology consultation for scrotal exploration and bladder irrigation   - diet per Surgery  - CBI per Urology --> off     # moderate b -reviewed home meds, continue SSRI currently appears stable     #hypomagnesium  # hypophos  -replete per protocol    # Recurrent DVT/PE  - xarelto, held for anemia     # LE wounds   - wound care     Dispo:  Need JOANNA on discharge, stable for dc medically.

## 2021-07-26 NOTE — PROGRESS NOTES
[facility]                INFECTIOUS DISEASE PROGRESS NOTE    [address] [facility] Patient Status:  Inpatient    1960 MRN [id number]   [facility] 3NW-A Attending Jazmine Aguirre MD   [facility] Day # 15 PCP Yisel Joiner DO 141   K 4.3   < > 4.5 5.1 4.5      < > 112 112 111   CO2 24.0   < > 25.0 25.0 27.0   ALKPHO 99  --   --   --   --    AST 32  --   --   --   --    ALT 24  --   --   --   --    BILT 0.7  --   --   --   --    TP 6.3*  --   --   --   --     < > = values Obesity, morbid ([address]     Vitreous hemorrhage of both eyes (HCC)     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

## 2021-07-26 NOTE — PHYSICAL THERAPY NOTE
PHYSICAL THERAPY TREATMENT NOTE - INPATIENT    Room Number: 324/324-A     Session: 3   Number of Visits to Meet Established Goals: 5    Presenting Problem: sepsis, UTI    History related to current admission: Pt is a 61 y.o. male admitted 7/13/21 with scr RLQ  Management Techniques: Body mechanics; Activity promotion;Breathing techniques;Repositioning    BALANCE                                                                                                                     Static Sitting: Fair -  Dynamic chair, needs met, BLE and scrotum elevated. Waffle cushion placed on chair for comfort. PCT and RN aware. Chair alarm on. Wide RW provided in room for pt use.        Patient End of Session: Up in chair;Needs met;Call light within reach;RN aware of session

## 2021-07-26 NOTE — PROGRESS NOTES
[facility] Cardiology Progress Note - [facility] Patient Status:  Inpatient    1960 MRN [id number]   [facility] 3NW-A Attending Hiral Engel MD   [facility] Day # 15 PCP DO Edel Johnson retention     Urinary tract infection with hematuria, site unspecified      Objective:   Temp: 98 °F (36.7 °C)  Pulse: 87  Resp: 18  BP: 117/58    Intake/Output:     Intake/Output Summary (Last 24 hours) at 7/26/2021 1212  Last data filed at 7/26/2021 1777 142   < > 142 139 142 142 141   K 4.3   < > 5.1 4.4 4.5 5.1 4.5      < > 114* 113* 112 112 111   CO2 24.0   < > 23.0 24.0 25.0 25.0 27.0   BUN 25*   < > 29* 27* 23* 26* 25*   CREATSERUM 0.87   < > 1.07 0.91 0.80 0.94 0.82   CA 8.4*   < > 8.2* 8.2* 8. (UNASYN) 3 g in sodium chloride 0.9% 100 mL IVPB-ADDV, 3 g, Intravenous, Q8H  famoTIDine (PEPCID) tab 20 mg, 20 mg, Oral, BID  Sertraline HCl (ZOLOFT) tab 50 mg, 50 mg, Oral, Daily  glucose (DEX4) oral liquid 15 g, 15 g, Oral, Q15 Min PRN   Or  Glucose-Vit

## 2021-07-26 NOTE — CM/SW NOTE
Reviewed message from [address] [place] [address] that stated the pt is out of network and will have a co pay. Asked them to call son to discuss this. [facility] was the pt's first choice.      Son stating if there is a co pay there, he would like [facility]

## 2021-07-26 NOTE — PLAN OF CARE
Patient is alert and oriented. On room air. Abdomen is soft/ non-distended. Patient states passing gas. Denies nausea. Low transverse incision closed with staples- c/d/I. Cleansed with CHG wipes this morning. Dressings to BLE with kerlix and tape- c/d/I.  D quality of pulses, skin color and temperature  - Assess for signs of decreased coronary artery perfusion - ex.  Angina  - Evaluate fluid balance, assess for edema, trend weights  Outcome: Progressing     Problem: SKIN/TISSUE INTEGRITY - ADULT  Goal: Skin in

## 2021-07-26 NOTE — CM/SW NOTE
Spoke w/Ting from [address] [place] [address] She stated they have insurance auth at this time. No dc order at this time.

## 2021-07-26 NOTE — OCCUPATIONAL THERAPY NOTE
OCCUPATIONAL THERAPY TREATMENT NOTE - INPATIENT     Room Number: 324/324-A  Session:  1  Number of Visits to Meet Established Goals: 7    Presenting Problem: septic shock, MUMTAZ, Cellulitis of left foot, urinary retention, UTI w/ hematuria    History related Unable to rate  Location: scrotum, catheter insertion  Management Techniques: Repositioning     ACTIVITY TOLERANCE                         O2 SATURATIONS       ACTIVITIES OF DAILY LIVING ASSESSMENT  AM-PAC ‘6-Clicks’ Inpatient Daily Activity Short Form  Ho reach; All patient questions and concerns addressed    ASSESSMENT   Patient is a 64year old male admitted 7/13/2021 for  7/13/2021 for UTI with gross hematuria, septic shock, left foot cellulitis, enlarged scrotum, urinary retention.   In OT session 1 of 7

## 2021-07-26 NOTE — PLAN OF CARE
Pt is alert and oriented x4. Lungs are clear diminished bilaterally. Bowel sounds are active. Denies nausea. Tolerating soft diet. Reports passing flatus. No bowel movements. Lower transverse incision with staples open to air. CDI.  RAN to right abdomen to b arterial and/or venous puncture sites for bleeding and/or hematoma  - Assess quality of pulses, skin color and temperature  - Assess for signs of decreased coronary artery perfusion - ex.  Angina  - Evaluate fluid balance, assess for edema, trend weights  O range  Description: INTERVENTIONS:  - Monitor Blood Glucose as ordered  - Assess for signs and symptoms of hyperglycemia and hypoglycemia  - Administer ordered medications to maintain glucose within target range  - Assess barriers to adequate nutritional i

## 2021-07-26 NOTE — PROGRESS NOTES
[facility] Hospitalist Progress Note     PCP: Art Navarrete DO    Chief Complaint: follow-up    SUBJECTIVE:    No chest pain, palpitations, shortness of breath, cough, nausea, vomiting, abdominal pain. Patient weak.     OBJECTIVE:  Temp:  [98.2 °F (36.8 °C)- 112 112   CO2 23.0   < > 24.0 23.0 24.0 25.0 25.0   BUN 29*   < > 25* 29* 27* 23* 26*   CREATSERUM 1.05   < > 0.87 1.07 0.91 0.80 0.94   CA 8.0*   < > 8.4* 8.2* 8.2* 8.3* 8.1*   MG 1.9  --  2.1  --   --   --   --    PHOS 2.8  --  2.9  --   --   --   -- contains colon, small bowel, and a markedly thick wall bladder  - surgery following --> POD # 3 S/P:  Right scrotal exploration, reduction of small bowel, colon and bladder, repair of abdominal wall hernia with mesh, intraoperative urology consultation for gauze packed into wound and dressed with dry dressing.  - currently on abx per above     # Major Depression/ Generalized anxiety d/o   -reviewed home meds, continue SSRI currently appears stable     #hypomagnesium  # hypophos  -replete per protocol    # Re

## 2021-07-27 VITALS
HEIGHT: 69 IN | WEIGHT: 253 LBS | RESPIRATION RATE: 16 BRPM | BODY MASS INDEX: 37.47 KG/M2 | TEMPERATURE: 98 F | SYSTOLIC BLOOD PRESSURE: 123 MMHG | OXYGEN SATURATION: 97 % | HEART RATE: 89 BPM | DIASTOLIC BLOOD PRESSURE: 77 MMHG

## 2021-07-27 LAB
ANION GAP SERPL CALC-SCNC: 5 MMOL/L (ref 0–18)
ANTIBODY SCREEN: NEGATIVE
BASOPHILS # BLD AUTO: 0.14 X10(3) UL (ref 0–0.2)
BASOPHILS NFR BLD AUTO: 1.8 %
BUN BLD-MCNC: 25 MG/DL (ref 7–18)
BUN/CREAT SERPL: 32.5 (ref 10–20)
CALCIUM BLD-MCNC: 8.3 MG/DL (ref 8.5–10.1)
CHLORIDE SERPL-SCNC: 111 MMOL/L (ref 98–112)
CO2 SERPL-SCNC: 26 MMOL/L (ref 21–32)
CREAT BLD-MCNC: 0.77 MG/DL
DEPRECATED RDW RBC AUTO: 56.8 FL (ref 35.1–46.3)
EOSINOPHIL # BLD AUTO: 0.29 X10(3) UL (ref 0–0.7)
EOSINOPHIL NFR BLD AUTO: 3.6 %
ERYTHROCYTE [DISTWIDTH] IN BLOOD BY AUTOMATED COUNT: 17.4 % (ref 11–15)
GLUCOSE BLD-MCNC: 131 MG/DL (ref 70–99)
GLUCOSE BLD-MCNC: 96 MG/DL (ref 70–99)
GLUCOSE BLD-MCNC: 99 MG/DL (ref 70–99)
HCT VFR BLD AUTO: 23.1 %
HGB BLD-MCNC: 7 G/DL
IMM GRANULOCYTES # BLD AUTO: 0.04 X10(3) UL (ref 0–1)
IMM GRANULOCYTES NFR BLD: 0.5 %
LYMPHOCYTES # BLD AUTO: 1.25 X10(3) UL (ref 1–4)
LYMPHOCYTES NFR BLD AUTO: 15.7 %
MCH RBC QN AUTO: 27.1 PG (ref 26–34)
MCHC RBC AUTO-ENTMCNC: 30.3 G/DL (ref 31–37)
MCV RBC AUTO: 89.5 FL
MONOCYTES # BLD AUTO: 0.91 X10(3) UL (ref 0.1–1)
MONOCYTES NFR BLD AUTO: 11.4 %
NEUTROPHILS # BLD AUTO: 5.34 X10 (3) UL (ref 1.5–7.7)
NEUTROPHILS # BLD AUTO: 5.34 X10(3) UL (ref 1.5–7.7)
NEUTROPHILS NFR BLD AUTO: 67 %
OSMOLALITY SERPL CALC.SUM OF ELEC: 298 MOSM/KG (ref 275–295)
PLATELET # BLD AUTO: 409 10(3)UL (ref 150–450)
POTASSIUM SERPL-SCNC: 4.6 MMOL/L (ref 3.5–5.1)
RBC # BLD AUTO: 2.58 X10(6)UL
RH BLOOD TYPE: POSITIVE
SODIUM SERPL-SCNC: 142 MMOL/L (ref 136–145)
WBC # BLD AUTO: 8 X10(3) UL (ref 4–11)

## 2021-07-27 PROCEDURE — 85025 COMPLETE CBC W/AUTO DIFF WBC: CPT | Performed by: HOSPITALIST

## 2021-07-27 PROCEDURE — 36430 TRANSFUSION BLD/BLD COMPNT: CPT

## 2021-07-27 PROCEDURE — 86850 RBC ANTIBODY SCREEN: CPT | Performed by: INTERNAL MEDICINE

## 2021-07-27 PROCEDURE — 80048 BASIC METABOLIC PNL TOTAL CA: CPT | Performed by: HOSPITALIST

## 2021-07-27 PROCEDURE — 30233N1 TRANSFUSION OF NONAUTOLOGOUS RED BLOOD CELLS INTO PERIPHERAL VEIN, PERCUTANEOUS APPROACH: ICD-10-PCS | Performed by: INTERNAL MEDICINE

## 2021-07-27 PROCEDURE — 86920 COMPATIBILITY TEST SPIN: CPT

## 2021-07-27 PROCEDURE — 86900 BLOOD TYPING SEROLOGIC ABO: CPT | Performed by: INTERNAL MEDICINE

## 2021-07-27 PROCEDURE — 82962 GLUCOSE BLOOD TEST: CPT

## 2021-07-27 PROCEDURE — 86901 BLOOD TYPING SEROLOGIC RH(D): CPT | Performed by: INTERNAL MEDICINE

## 2021-07-27 RX ORDER — FUROSEMIDE 40 MG/1
20 TABLET ORAL DAILY
Qty: 135 TABLET | Refills: 3 | Status: SHIPPED | OUTPATIENT
Start: 2021-07-27

## 2021-07-27 RX ORDER — SACUBITRIL AND VALSARTAN 24; 26 MG/1; MG/1
TABLET, FILM COATED ORAL
Qty: 180 TABLET | Refills: 3 | Status: SHIPPED | OUTPATIENT
Start: 2021-07-27 | End: 2021-11-01

## 2021-07-27 RX ORDER — METOPROLOL SUCCINATE 25 MG/1
25 TABLET, EXTENDED RELEASE ORAL
Qty: 30 TABLET | Refills: 0 | Status: SHIPPED | OUTPATIENT
Start: 2021-07-28 | End: 2021-09-14

## 2021-07-27 RX ORDER — FUROSEMIDE 40 MG/1
20 TABLET ORAL DAILY
Qty: 135 TABLET | Refills: 3 | Status: SHIPPED | OUTPATIENT
Start: 2021-07-27 | End: 2021-07-27

## 2021-07-27 RX ORDER — OXYBUTYNIN CHLORIDE 5 MG/1
5 TABLET ORAL EVERY 12 HOURS PRN
Qty: 60 TABLET | Refills: 0 | Status: SHIPPED | OUTPATIENT
Start: 2021-07-27 | End: 2021-08-16

## 2021-07-27 RX ORDER — MIDODRINE HYDROCHLORIDE 5 MG/1
5 TABLET ORAL
Qty: 60 TABLET | Refills: 0 | Status: SHIPPED | OUTPATIENT
Start: 2021-07-27

## 2021-07-27 RX ORDER — SODIUM CHLORIDE 9 MG/ML
INJECTION, SOLUTION INTRAVENOUS ONCE
Status: COMPLETED | OUTPATIENT
Start: 2021-07-27 | End: 2021-07-27

## 2021-07-27 RX ORDER — METOPROLOL SUCCINATE 25 MG/1
25 TABLET, EXTENDED RELEASE ORAL
Qty: 30 TABLET | Refills: 0 | Status: SHIPPED | OUTPATIENT
Start: 2021-07-28 | End: 2021-07-27

## 2021-07-27 RX ORDER — AMOXICILLIN AND CLAVULANATE POTASSIUM 875; 125 MG/1; MG/1
1 TABLET, FILM COATED ORAL 2 TIMES DAILY
Qty: 28 TABLET | Refills: 0 | Status: SHIPPED | OUTPATIENT
Start: 2021-07-27 | End: 2021-08-11 | Stop reason: ALTCHOICE

## 2021-07-27 RX ORDER — PHENAZOPYRIDINE HYDROCHLORIDE 200 MG/1
200 TABLET, FILM COATED ORAL 3 TIMES DAILY PRN
Qty: 60 TABLET | Refills: 0 | Status: SHIPPED | OUTPATIENT
Start: 2021-07-27

## 2021-07-27 NOTE — PROGRESS NOTES
[facility]                INFECTIOUS DISEASE PROGRESS NOTE    [address] [facility] Patient Status:  Inpatient    1960 MRN [id number]   [facility] 3NW-A Attending Janeth Flowers MD   [facility] Day # 15 PCP Kali Carson DO 141 142   K 4.3   < > 5.1 4.5 4.6      < > 112 111 111   CO2 24.0   < > 25.0 27.0 26.0   ALKPHO 99  --   --   --   --    AST 32  --   --   --   --    ALT 24  --   --   --   --    BILT 0.7  --   --   --   --    TP 6.3*  --   --   --   --     < > = radha Obesity, morbid ([address]     Vitreous hemorrhage of both eyes (HCC)     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

## 2021-07-27 NOTE — PROGRESS NOTES
65- Writing RN spoke with heme/onc MD. Orders to transfuse 1 unit PRBC. Pt cleared for discharge from heme/onc MD after receiving PRBC.

## 2021-07-27 NOTE — PROGRESS NOTES
[facility]  Progress Note    [address] [facility] Patient Status:  Inpatient    1960 MRN [id number]   [facility] 3NW-A Attending Aleja Jimenes MD   [facility] Day # 14 PCP Ramonita Perry DO     Subjective:    Patient tolerating Pulmonary hypertension ([facility]     Multiple subsegmental pulmonary emboli without acute cor pulmonale (HCC)     Sepsis secondary to UTI ([address]     Septic shock ([facility]     MUMTAZ (acute kidney injury) ([facility]     Cellulitis of left foot     Urinary retention     Zuleika Estrada

## 2021-07-27 NOTE — PROGRESS NOTES
[facility] Cardiology Progress Note - [facility] Patient Status:  Inpatient    1960 MRN [id number]   [facility] 3NW-A Attending Sherry Madden MD   [facility] Day # 15 PCP DO Cami Campa Temp: 98 °F (36.7 °C)  Pulse: 90  Resp: 18  BP: 124/70    Intake/Output:     Intake/Output Summary (Last 24 hours) at 7/27/2021 0944  Last data filed at 7/27/2021 0731  Gross per 24 hour   Intake 1472 ml   Output 1905 ml   Net -433 ml       Last 3 Weight 0. 82 0.77   CA 8.4*   < > 8.2* 8.3* 8.1* 8.1* 8.3*   MG 2.1  --   --   --   --   --   --    PHOS 2.9  --   --   --   --   --   --    GLU 87   < > 164* 117* 151* 111* 96    < > = values in this interval not displayed.        Recent Labs   Lab 07/21/21  3141 Daily  glucose (DEX4) oral liquid 15 g, 15 g, Oral, Q15 Min PRN   Or  Glucose-Vitamin C (DEX-4) chewable tab 4 tablet, 4 tablet, Oral, Q15 Min PRN   Or  dextrose 50 % injection 50 mL, 50 mL, Intravenous, Q15 Min PRN   Or  glucose (DEX4) oral liquid 30 g, 3

## 2021-07-27 NOTE — PROGRESS NOTES
NURSING DISCHARGE NOTE    Discharged Rehab facility via [place]. Accompanied by friend  Belongings Taken by patient/family. Pt discharged via medicar to [facility]. Pt discharged in stable condition. Newsome catheter in place upon discharge.

## 2021-07-27 NOTE — DISCHARGE SUMMARY
General Medicine Discharge Summary     Patient ID:  Leandro Davidson  64year old  7/18/1960    Admit date: 7/13/2021    Discharge date and time:7/27/2021    Attending Physician: Tulio Naranjo improving     # massive right incarcerated inguinal hernia which contains colon, small bowel, and a markedly thick wall bladder  - surgery following --> POD # 4 S/P:  Right scrotal exploration, reduction of small bowel, colon and bladder, repair of abdomin 10ml of normal saline.  Betadine soaked gauze packed into wound and dressed with dry dressing.  - currently on abx per above      # Major Depression/ Generalized anxiety d/o   -reviewed home meds, continue SSRI currently appears stable      #hypomagnesium 1 tablet by mouth 2 (two) times daily. aspirin 81 MG Oral Tab EC  Take 1 tablet (81 mg total) by mouth daily.           I PERSONALLY RECONCILED CURRENT AND DISCHARGE MEDICATIONS ON DAY OF DISCHARGE      Activity: activity as tolerated  Diet: cardiac diet

## 2021-07-27 NOTE — PLAN OF CARE
1253- Per hospitalist MD. Patient can be discharged. Pt does not need hgb recheck prior to discharge. Heme/onc MD cleared patient for discharge after PRBC infused. Pt resting in bed. Receiving PRBC's. Tolerating diet. Accucheck Qid.  Up with max 2 assist hemodynamic stability  - Monitor arterial and/or venous puncture sites for bleeding and/or hematoma  - Assess quality of pulses, skin color and temperature  - Assess for signs of decreased coronary artery perfusion - ex.  Angina  - Evaluate fluid balance, a Problem: Diabetes/Glucose Control  Goal: Glucose maintained within prescribed range  Description: INTERVENTIONS:  - Monitor Blood Glucose as ordered  - Assess for signs and symptoms of hyperglycemia and hypoglycemia  - Administer ordered medications to m

## 2021-07-27 NOTE — PROGRESS NOTES
Per urology MD, patient can be discharged from urology standpoint. Will need to follow up with original urology MD in 1 week. Will be discharged with catheter. 1100- PRBC transfusion began. Pt tolerated first 15 minutes while writing RN was in room.  VS

## 2021-07-27 NOTE — PROGRESS NOTES
Pt is Ax3 (disoriented to time), vss, afebrile, daily wt, A/C QID, tolerating low fiber/ADA diet, poor vision d/t diabetic retinopathy, IVF infusing, Newsome in place (do not remove), up w/ max asst. Low transverse incision w/ staples, BG, c/d/i.  Productive

## 2021-07-27 NOTE — PROGRESS NOTES
[facility]  Progress Note    201 [facility] Patient Status:  Inpatient    1960 MRN [id number]   [facility] 3NW-A Attending José Manuel Camacho MD   [facility] Day # 15 PCP Caleb Mariano DO     Subjective:  [address] is a(n) Aorto-iliac atherosclerosis ([facility]     Pulmonary hypertension (HCC)     Multiple subsegmental pulmonary emboli without acute cor pulmonale (HCC)     Sepsis secondary to UTI (HCC)     Septic shock (HCC)     MUMTAZ (acute kidney injury) (HCC)     Cellulitis of l

## 2021-07-28 LAB — BLOOD TYPE BARCODE: 6200

## 2021-07-28 NOTE — PAYOR COMM NOTE
Discharge Notification    Patient Name: Carmita Drop: Wendy Tiptonrecht 673 #: [id number]  Authorization Number: [id number]  Admit Date/Time: 7/13/2021 6:06 AM  Discharge Date/Time: 7/27/2021 4:24 PM

## 2021-07-29 ENCOUNTER — SNF VISIT (OUTPATIENT)
Dept: INTERNAL MEDICINE CLINIC | Age: 61
End: 2021-07-29

## 2021-07-29 DIAGNOSIS — N50.89 SCROTAL EDEMA: ICD-10-CM

## 2021-07-29 DIAGNOSIS — D64.9 POSTOPERATIVE ANEMIA: ICD-10-CM

## 2021-07-29 DIAGNOSIS — E13.319 RETINOPATHY DUE TO SECONDARY DIABETES MELLITUS (HCC): ICD-10-CM

## 2021-07-29 DIAGNOSIS — I10 ESSENTIAL HYPERTENSION: ICD-10-CM

## 2021-07-29 DIAGNOSIS — E11.9 DIABETES MELLITUS TYPE 2, NONINSULIN DEPENDENT (HCC): ICD-10-CM

## 2021-07-29 DIAGNOSIS — L03.116 CELLULITIS OF LEFT LOWER EXTREMITY: ICD-10-CM

## 2021-07-29 DIAGNOSIS — Z87.19 S/P RIGHT INGUINAL HERNIA REPAIR: Primary | ICD-10-CM

## 2021-07-29 DIAGNOSIS — Z98.890 S/P RIGHT INGUINAL HERNIA REPAIR: Primary | ICD-10-CM

## 2021-07-29 DIAGNOSIS — I50.22 CHRONIC SYSTOLIC CONGESTIVE HEART FAILURE (HCC): ICD-10-CM

## 2021-07-29 DIAGNOSIS — N40.0 BENIGN PROSTATIC HYPERPLASIA WITHOUT LOWER URINARY TRACT SYMPTOMS: ICD-10-CM

## 2021-07-29 DIAGNOSIS — L97.529 CHRONIC TOE ULCER, LEFT, WITH UNSPECIFIED SEVERITY (HCC): ICD-10-CM

## 2021-07-29 PROCEDURE — 3074F SYST BP LT 130 MM HG: CPT | Performed by: NURSE PRACTITIONER

## 2021-07-29 PROCEDURE — 3079F DIAST BP 80-89 MM HG: CPT | Performed by: NURSE PRACTITIONER

## 2021-07-29 PROCEDURE — 99310 SBSQ NF CARE HIGH MDM 45: CPT | Performed by: NURSE PRACTITIONER

## 2021-08-03 ENCOUNTER — SNF VISIT (OUTPATIENT)
Dept: INTERNAL MEDICINE CLINIC | Age: 61
End: 2021-08-03

## 2021-08-03 DIAGNOSIS — N40.0 BENIGN PROSTATIC HYPERPLASIA WITHOUT LOWER URINARY TRACT SYMPTOMS: ICD-10-CM

## 2021-08-03 DIAGNOSIS — I50.22 CHRONIC SYSTOLIC CONGESTIVE HEART FAILURE (HCC): ICD-10-CM

## 2021-08-03 DIAGNOSIS — Z98.890 S/P RIGHT INGUINAL HERNIA REPAIR: Primary | ICD-10-CM

## 2021-08-03 DIAGNOSIS — D64.9 POSTOPERATIVE ANEMIA: ICD-10-CM

## 2021-08-03 DIAGNOSIS — E11.9 DIABETES MELLITUS TYPE 2, NONINSULIN DEPENDENT (HCC): ICD-10-CM

## 2021-08-03 DIAGNOSIS — R53.81 PHYSICAL DECONDITIONING: ICD-10-CM

## 2021-08-03 DIAGNOSIS — Z87.19 S/P RIGHT INGUINAL HERNIA REPAIR: Primary | ICD-10-CM

## 2021-08-03 DIAGNOSIS — E13.319 RETINOPATHY DUE TO SECONDARY DIABETES MELLITUS (HCC): ICD-10-CM

## 2021-08-03 DIAGNOSIS — L97.529 CHRONIC TOE ULCER, LEFT, WITH UNSPECIFIED SEVERITY (HCC): ICD-10-CM

## 2021-08-03 DIAGNOSIS — I10 ESSENTIAL HYPERTENSION: ICD-10-CM

## 2021-08-03 DIAGNOSIS — N50.89 SCROTAL EDEMA: ICD-10-CM

## 2021-08-03 DIAGNOSIS — L03.116 CELLULITIS OF LEFT LOWER EXTREMITY: ICD-10-CM

## 2021-08-03 PROCEDURE — 3074F SYST BP LT 130 MM HG: CPT | Performed by: NURSE PRACTITIONER

## 2021-08-03 PROCEDURE — 3079F DIAST BP 80-89 MM HG: CPT | Performed by: NURSE PRACTITIONER

## 2021-08-03 PROCEDURE — 99309 SBSQ NF CARE MODERATE MDM 30: CPT | Performed by: NURSE PRACTITIONER

## 2021-08-05 ENCOUNTER — SNF VISIT (OUTPATIENT)
Dept: INTERNAL MEDICINE CLINIC | Age: 61
End: 2021-08-05

## 2021-08-05 ENCOUNTER — HOSPITAL ENCOUNTER (EMERGENCY)
Facility: HOSPITAL | Age: 61
Discharge: HOME | End: 2021-08-05
Attending: EMERGENCY MEDICINE
Payer: MEDICARE

## 2021-08-05 VITALS
WEIGHT: 249 LBS | HEART RATE: 89 BPM | OXYGEN SATURATION: 99 % | SYSTOLIC BLOOD PRESSURE: 120 MMHG | DIASTOLIC BLOOD PRESSURE: 80 MMHG | RESPIRATION RATE: 20 BRPM | TEMPERATURE: 98 F | BODY MASS INDEX: 37 KG/M2

## 2021-08-05 VITALS
TEMPERATURE: 98 F | WEIGHT: 235.63 LBS | OXYGEN SATURATION: 99 % | RESPIRATION RATE: 19 BRPM | SYSTOLIC BLOOD PRESSURE: 124 MMHG | HEART RATE: 94 BPM | DIASTOLIC BLOOD PRESSURE: 86 MMHG | BODY MASS INDEX: 35 KG/M2

## 2021-08-05 VITALS
SYSTOLIC BLOOD PRESSURE: 124 MMHG | OXYGEN SATURATION: 95 % | BODY MASS INDEX: 35.99 KG/M2 | RESPIRATION RATE: 20 BRPM | DIASTOLIC BLOOD PRESSURE: 77 MMHG | HEIGHT: 69 IN | HEART RATE: 96 BPM | WEIGHT: 243 LBS | TEMPERATURE: 98 F

## 2021-08-05 DIAGNOSIS — R31.0 GROSS HEMATURIA: Primary | ICD-10-CM

## 2021-08-05 DIAGNOSIS — R33.8 ACUTE URINARY RETENTION: ICD-10-CM

## 2021-08-05 DIAGNOSIS — Z98.890 S/P RIGHT INGUINAL HERNIA REPAIR: ICD-10-CM

## 2021-08-05 DIAGNOSIS — N50.89 SCROTAL EDEMA: ICD-10-CM

## 2021-08-05 DIAGNOSIS — R31.0 GROSS HEMATURIA: ICD-10-CM

## 2021-08-05 DIAGNOSIS — Z87.19 S/P RIGHT INGUINAL HERNIA REPAIR: ICD-10-CM

## 2021-08-05 DIAGNOSIS — T83.9XXA PROBLEM WITH FOLEY CATHETER, INITIAL ENCOUNTER (HCC): Primary | ICD-10-CM

## 2021-08-05 DIAGNOSIS — R10.2 MALE PELVIC PAIN: ICD-10-CM

## 2021-08-05 LAB
ALBUMIN SERPL-MCNC: 2 G/DL (ref 3.4–5)
ALBUMIN/GLOB SERPL: 0.5 {RATIO} (ref 1–2)
ALP LIVER SERPL-CCNC: 82 U/L
ALT SERPL-CCNC: 19 U/L
ANION GAP SERPL CALC-SCNC: 3 MMOL/L (ref 0–18)
APTT PPP: 54 SECONDS (ref 25.4–36.1)
AST SERPL-CCNC: 19 U/L (ref 15–37)
BASOPHILS # BLD AUTO: 0.12 X10(3) UL (ref 0–0.2)
BASOPHILS NFR BLD AUTO: 1.7 %
BILIRUB SERPL-MCNC: 0.4 MG/DL (ref 0.1–2)
BILIRUB UR QL CFM: NEGATIVE
BUN BLD-MCNC: 21 MG/DL (ref 7–18)
CALCIUM BLD-MCNC: 8.4 MG/DL (ref 8.5–10.1)
CHLORIDE SERPL-SCNC: 111 MMOL/L (ref 98–112)
CO2 SERPL-SCNC: 27 MMOL/L (ref 21–32)
CREAT BLD-MCNC: 0.87 MG/DL
EOSINOPHIL # BLD AUTO: 0.4 X10(3) UL (ref 0–0.7)
EOSINOPHIL NFR BLD AUTO: 5.7 %
ERYTHROCYTE [DISTWIDTH] IN BLOOD BY AUTOMATED COUNT: 16.9 %
GLOBULIN PLAS-MCNC: 4.4 G/DL (ref 2.8–4.4)
GLUCOSE BLD-MCNC: 133 MG/DL (ref 70–99)
GLUCOSE UR STRIP.AUTO-MCNC: NEGATIVE MG/DL
HCT VFR BLD AUTO: 27.2 %
HGB BLD-MCNC: 8.1 G/DL
IMM GRANULOCYTES # BLD AUTO: 0.03 X10(3) UL (ref 0–1)
IMM GRANULOCYTES NFR BLD: 0.4 %
INR BLD: 2.2 (ref 0.89–1.11)
LYMPHOCYTES # BLD AUTO: 0.91 X10(3) UL (ref 1–4)
LYMPHOCYTES NFR BLD AUTO: 13 %
M PROTEIN MFR SERPL ELPH: 6.4 G/DL (ref 6.4–8.2)
MCH RBC QN AUTO: 26.6 PG (ref 26–34)
MCHC RBC AUTO-ENTMCNC: 29.8 G/DL (ref 31–37)
MCV RBC AUTO: 89.5 FL
MONOCYTES # BLD AUTO: 0.76 X10(3) UL (ref 0.1–1)
MONOCYTES NFR BLD AUTO: 10.9 %
NEUTROPHILS # BLD AUTO: 4.76 X10 (3) UL (ref 1.5–7.7)
NEUTROPHILS # BLD AUTO: 4.76 X10(3) UL (ref 1.5–7.7)
NEUTROPHILS NFR BLD AUTO: 68.3 %
NITRITE UR QL STRIP.AUTO: POSITIVE
OSMOLALITY SERPL CALC.SUM OF ELEC: 297 MOSM/KG (ref 275–295)
PH UR STRIP.AUTO: 6 [PH] (ref 5–8)
PLATELET # BLD AUTO: 511 10(3)UL (ref 150–450)
POTASSIUM SERPL-SCNC: 4.4 MMOL/L (ref 3.5–5.1)
PROT UR STRIP.AUTO-MCNC: >=300 MG/DL
PSA SERPL DL<=0.01 NG/ML-MCNC: 24.9 SECONDS (ref 12.2–14.5)
RBC # BLD AUTO: 3.04 X10(6)UL
RBC #/AREA URNS AUTO: >10 /HPF
SODIUM SERPL-SCNC: 141 MMOL/L (ref 136–145)
SP GR UR STRIP.AUTO: 1.02 (ref 1–1.03)
UROBILINOGEN UR STRIP.AUTO-MCNC: 1 MG/DL
WBC # BLD AUTO: 7 X10(3) UL (ref 4–11)
WBC #/AREA URNS AUTO: >50 /HPF

## 2021-08-05 PROCEDURE — 87086 URINE CULTURE/COLONY COUNT: CPT | Performed by: EMERGENCY MEDICINE

## 2021-08-05 PROCEDURE — 81015 MICROSCOPIC EXAM OF URINE: CPT | Performed by: EMERGENCY MEDICINE

## 2021-08-05 PROCEDURE — 3078F DIAST BP <80 MM HG: CPT | Performed by: NURSE PRACTITIONER

## 2021-08-05 PROCEDURE — 80053 COMPREHEN METABOLIC PANEL: CPT | Performed by: EMERGENCY MEDICINE

## 2021-08-05 PROCEDURE — 96361 HYDRATE IV INFUSION ADD-ON: CPT

## 2021-08-05 PROCEDURE — 3074F SYST BP LT 130 MM HG: CPT | Performed by: NURSE PRACTITIONER

## 2021-08-05 PROCEDURE — 85610 PROTHROMBIN TIME: CPT | Performed by: EMERGENCY MEDICINE

## 2021-08-05 PROCEDURE — 99284 EMERGENCY DEPT VISIT MOD MDM: CPT

## 2021-08-05 PROCEDURE — 85730 THROMBOPLASTIN TIME PARTIAL: CPT | Performed by: EMERGENCY MEDICINE

## 2021-08-05 PROCEDURE — 85025 COMPLETE CBC W/AUTO DIFF WBC: CPT | Performed by: EMERGENCY MEDICINE

## 2021-08-05 PROCEDURE — 96365 THER/PROPH/DIAG IV INF INIT: CPT

## 2021-08-05 PROCEDURE — 99310 SBSQ NF CARE HIGH MDM 45: CPT | Performed by: NURSE PRACTITIONER

## 2021-08-05 PROCEDURE — 99285 EMERGENCY DEPT VISIT HI MDM: CPT

## 2021-08-05 PROCEDURE — 81001 URINALYSIS AUTO W/SCOPE: CPT | Performed by: EMERGENCY MEDICINE

## 2021-08-05 RX ORDER — MAGNESIUM HYDROXIDE 1200 MG/15ML
3000 LIQUID ORAL CONTINUOUS
Status: CANCELLED | OUTPATIENT
Start: 2021-08-05

## 2021-08-05 RX ORDER — CEPHALEXIN 500 MG/1
500 CAPSULE ORAL 3 TIMES DAILY
Qty: 30 CAPSULE | Refills: 0 | Status: SHIPPED | OUTPATIENT
Start: 2021-08-05 | End: 2021-08-15

## 2021-08-05 NOTE — ED QUICK NOTES
Newsome catheter manually irrigated with 150 mL NS. Several small blood clots removed. Output went from dark red to lightly pink tinged.

## 2021-08-05 NOTE — ED QUICK NOTES
Patient transferred back to [facility] [place], operated by [facility] with [facility] staff.

## 2021-08-05 NOTE — ED INITIAL ASSESSMENT (HPI)
Patient presents for a Newsome problem. Patient reports that around dinner time yesterday evening his Newsome stopped draining.   This morning nursing home staff sent him to the ER for a clogged Newsome, but patient states just before the ambulance arrived they

## 2021-08-05 NOTE — ED PROVIDER NOTES
Patient Seen in: [facility] Emergency Department      History   Patient presents with:  Urinary Symptoms    Stated Complaint:     HPI/Subjective:   HPI    24-year-old white male presents to the emergency room today for complaint of hematuria.  [place] normocephalic atraumatic conjunctiva is clear. Sclerae anicteric. Neck is supple. Lungs are clear to auscultation bilaterally.   Heart is regular rate and rhythm with a murmur but no gallop or rub    Abdomen is soft nondistended nontender to deep palpa CBC W/ DIFFERENTIAL - Abnormal; Notable for the following components:    RBC 3.04 (*)     HGB 8.1 (*)     HCT 27.2 (*)     .0 (*)     MCHC 29.8 (*)     Lymphocyte Absolute 0.91 (*)     All other components within normal limits   ICTOTEST - Normal the nursing home. He states he feels well he wants to eat so he will be fed at home while awaiting for the Rocephin to be infused and then he will be discharged back to nursing home to follow-up with urology as an outpatient.                              D

## 2021-08-05 NOTE — ED INITIAL ASSESSMENT (HPI)
Pt here for hematuria  Per medic, \"he's having some bleeding problems in his hanson. The nursing home noticed blood in his hanson this AM.\"  No fevers. No pain. Pt states \"I had hernia surgery 2 weeks ago and they had to put a hanson in with the surgery. \

## 2021-08-06 VITALS
TEMPERATURE: 96 F | WEIGHT: 236.63 LBS | OXYGEN SATURATION: 98 % | HEART RATE: 99 BPM | DIASTOLIC BLOOD PRESSURE: 76 MMHG | SYSTOLIC BLOOD PRESSURE: 129 MMHG | BODY MASS INDEX: 35 KG/M2 | RESPIRATION RATE: 17 BRPM

## 2021-08-06 NOTE — PROGRESS NOTES
Joce Swanson. APN Encounter 8/5/21 1:45 pm (late entry)    RN requested this APN to evaluate patient's hanson catheter. Has no urine output from this morning. C/O pelvic pressure.    Orders from urology if not to remove catheter d/t difficult insertion today. C/O hernia incisional pain. C/O poor vision. EXAM:VS: /76, P 99, RR 17, POx 98%, T 96.1. Wgt 236.6 from 249#  GENERAL HEALTH: well developed, well nourished 64 yr old male.     LINES, TUBES, DRAINS:  Newsome catheter - not functioning;  + hem irrigation. F/U Urologist, Dr. Jadiel Guerrero.     Paula Mcnulty APRN  8/5/21 1:45 pm  [facility] Transition Care Team

## 2021-08-06 NOTE — PROGRESS NOTES
Junior Estevez.  APN ENcounter 21 1 pm (late entry)    Mr. Nadeen Escobedo seen at bedside 2 days after being admitted to [facility] from [address]  : 1960 .  64year old  male patient is admitted to [facility] ([facility] AND 7) Re Take 1 capsule (500 mg total) by mouth 3 (three) times daily for 10 days. 30 capsule 0   • Midodrine HCl 5 MG Oral Tab Take 1 tablet (5 mg total) by mouth 2 (two) times daily before meals.  60 tablet 0   • oxybutynin 5 MG Oral Tab Take 1 tablet (5 mg total) DRAINS:  Newsome catheter  SKIN: warm, dry  WOUND: Inguinal Incision - 19 staples intact. No drainage. EYES: Pupils round and equal; no nystagmus, jaundice, drainage   HENT: mucous membranes pink, moist  NECK: F ROM  RESPIRATORY: lungs clear.   CARDIOVASCUL

## 2021-08-06 NOTE — CM/SW NOTE
EDCM received call from [address] [place] [address] who would like to clarify if patient is to be taking both Augmentin and Kefllex. Patient was already on Augmentin for cellulitis and was prescribed keflex today form the ER for UTI.  I spoke with Dr. Shaila Reese and

## 2021-08-06 NOTE — PROGRESS NOTES
Joce Swanson. APN Encounter 8/3/21 1:30 pm (late entry)    Mr. Anders Councilman seen in his room watching TV. He reports feeling better and stronger.     Severiano Bob  : 1960 .  64year old  male patient was admitted to [facility] ([facility]) for Rehab, pain Take 1 tablet (5 mg total) by mouth 2 (two) times daily before meals. 60 tablet 0   • oxybutynin 5 MG Oral Tab Take 1 tablet (5 mg total) by mouth every 12 (twelve) hours as needed (bladder spasms).  60 tablet 0   • phenazopyridine 200 MG Oral Tab Take 1 ta ROM  RESPIRATORY: lungs clear. CARDIOVASCULAR: RRR, rate 94. ABDOMEN:  Soft nontender, BS+, 19 staples s/p inguinal repair   : hanosn catheter. + Scrotal edema.   MUSCULOSKELETAL: Weakness R/T recent hospitalization, hernia repair, cellulitis LLE, L toe

## 2021-08-10 ENCOUNTER — SNF VISIT (OUTPATIENT)
Dept: INTERNAL MEDICINE CLINIC | Facility: SKILLED NURSING FACILITY | Age: 61
End: 2021-08-10

## 2021-08-10 DIAGNOSIS — L97.529 CHRONIC TOE ULCER, LEFT, WITH UNSPECIFIED SEVERITY (HCC): ICD-10-CM

## 2021-08-10 DIAGNOSIS — Z87.19 S/P RIGHT INGUINAL HERNIA REPAIR: Primary | ICD-10-CM

## 2021-08-10 DIAGNOSIS — E11.9 DIABETES MELLITUS TYPE 2, NONINSULIN DEPENDENT (HCC): ICD-10-CM

## 2021-08-10 DIAGNOSIS — Z98.890 S/P RIGHT INGUINAL HERNIA REPAIR: Primary | ICD-10-CM

## 2021-08-10 DIAGNOSIS — R53.81 PHYSICAL DECONDITIONING: ICD-10-CM

## 2021-08-10 DIAGNOSIS — N50.89 SCROTAL EDEMA: ICD-10-CM

## 2021-08-10 DIAGNOSIS — I50.22 CHRONIC SYSTOLIC CONGESTIVE HEART FAILURE (HCC): ICD-10-CM

## 2021-08-10 DIAGNOSIS — L03.116 CELLULITIS OF LEFT LOWER EXTREMITY: ICD-10-CM

## 2021-08-10 DIAGNOSIS — N40.0 BENIGN PROSTATIC HYPERPLASIA WITHOUT LOWER URINARY TRACT SYMPTOMS: ICD-10-CM

## 2021-08-10 DIAGNOSIS — I10 ESSENTIAL HYPERTENSION: ICD-10-CM

## 2021-08-10 DIAGNOSIS — E13.319 RETINOPATHY DUE TO SECONDARY DIABETES MELLITUS (HCC): ICD-10-CM

## 2021-08-10 DIAGNOSIS — R10.2 MALE PELVIC PAIN: ICD-10-CM

## 2021-08-10 PROCEDURE — 3074F SYST BP LT 130 MM HG: CPT | Performed by: NURSE PRACTITIONER

## 2021-08-10 PROCEDURE — 99316 NF DSCHRG MGMT 30 MIN+: CPT | Performed by: NURSE PRACTITIONER

## 2021-08-10 PROCEDURE — 3079F DIAST BP 80-89 MM HG: CPT | Performed by: NURSE PRACTITIONER

## 2021-08-16 VITALS
OXYGEN SATURATION: 100 % | BODY MASS INDEX: 36 KG/M2 | TEMPERATURE: 97 F | WEIGHT: 236.63 LBS | RESPIRATION RATE: 19 BRPM | SYSTOLIC BLOOD PRESSURE: 127 MMHG | HEART RATE: 99 BPM | DIASTOLIC BLOOD PRESSURE: 86 MMHG

## 2021-08-16 RX ORDER — TAMSULOSIN HYDROCHLORIDE 0.4 MG/1
0.4 CAPSULE ORAL DAILY
COMMUNITY

## 2021-08-16 NOTE — PROGRESS NOTES
Pablo ANDRADE Encounter 8/10/21 2 pm (late entry)  [address]    Mr. Karo Pearl seen at bedside with wife present packing up his belongings and will discharge this afternoon. He is anxious to return home.   He will f/u with MD once he leave motivated to improve so he is stronger to return home. He ambulated with aid of a walker; also uses wheelchair. He gradually improved with therapy assistance. Home with home RN and PT.  DC Plan: continue therapy at home with [facility].      Sk 0.4 mg daily. Lower extremity cellulitis and L toe ulcer:  Local wound care. Home health to follow. Anemia. HGB 8.5 from 7.5. CHF/HTN: EF 30%. Entresto 0.5 bid. Toprol Succ 25 mg daily. Lasix 40 mg daily.       Depression:  Zoloft 50 mg richardson

## 2022-07-15 NOTE — PROGRESS NOTES
[facility] Cardiology Progress Note - Shyrl Ormond [facility] Patient Status:  Inpatient    1960 MRN [id number]   [facility] 8NE-A Attending Carlos Manuel Chaudhary MD   Hosp Day # 1 PCP Eloy Martinez MD     Subjective:  Jacoby Lazcano Patient notified. Patient verbalized understanding of information given. may be underrepresented by low ejection fraction.  -Hypertension–controlled on medication        Patient Active Problem List:     Diabetes mellitus ([facility]     Hypertension     Acute on chronic right-sided congestive heart failure (HCC)     Tachycardia     E MCV 91.2 90.9 89.9   .0 241.0 247.0   INR 1.40*  --   --        Recent Labs   Lab 01/03/21  1926 01/04/21  0457 01/05/21  0525    141 141   K 4.7 4.3 3.6    111 108   CO2 25.0 23.0 24.0   BUN 23* 22* 26*   CREATSERUM 1.45* 1.33* 1.62* symptoms of depression or anxiety  Hematology: denies bruising or excessive bleeding  Endocrine: no history of diabetes  Allergy: see above drug allergies    Bo Frederick MD  1/5/2021  10:37 AM

## (undated) DEVICE — 3M™ TEGADERM™ TRANSPARENT FILM DRESSING, 1626W, 4 IN X 4-3/4 IN (10 CM X 12 CM), 50 EACH/CARTON, 4 CARTON/CASE: Brand: 3M™ TEGADERM™

## (undated) DEVICE — DRAIN CHANNEL 19FR BLAKE

## (undated) DEVICE — Device

## (undated) DEVICE — URINE DRAINAGE BAG,BAG, NEEDLE SAMPLING, DRAIN TUBE: Brand: DOVER

## (undated) DEVICE — SUTURE PDS II 0 CT

## (undated) DEVICE — STERILE POLYISOPRENE POWDER-FREE SURGICAL GLOVES: Brand: PROTEXIS

## (undated) DEVICE — SOL  .9 3000ML

## (undated) DEVICE — SPONGE STICK WITH PVP-I: Brand: KENDALL

## (undated) DEVICE — SUPPORTER ATHL LG LG SPNS SPRT

## (undated) DEVICE — SUTURE VICRYL 2-0

## (undated) DEVICE — BREAST-HERNIA-PORT CDS-LF: Brand: MEDLINE INDUSTRIES, INC.

## (undated) DEVICE — CURVED, SMALL JAW, OPEN SEALER/DIVIDER: Brand: LIGASURE

## (undated) DEVICE — SEAL BIOPSY PORT ACMI

## (undated) DEVICE — SUTURE PDS II 0 CT-1

## (undated) DEVICE — CYSTO CDS-LF: Brand: MEDLINE INDUSTRIES, INC.

## (undated) DEVICE — STERILE SYNTHETIC POLYISOPRENE POWDER-FREE SURGICAL GLOVES WITH HYDROGEL COATING, SMOOTH FINISH, STRAIGHT FINGER: Brand: PROTEXIS

## (undated) DEVICE — 450 ML BOTTLE OF 0.05% CHLORHEXIDINE GLUCONATE IN 99.95% STERILE WATER FOR IRRIGATION, USP AND APPLICATOR.: Brand: IRRISEPT ANTIMICROBIAL WOUND LAVAGE

## (undated) DEVICE — TIGERTAIL 5F FLXTIP 70CM

## (undated) DEVICE — SOL  .9 1000ML BTL

## (undated) DEVICE — STRL PENROSE DRAIN 18" X 1/4": Brand: CARDINAL HEALTH

## (undated) DEVICE — VIOLET BRAIDED (POLYGLACTIN 910), SYNTHETIC ABSORBABLE SUTURE: Brand: COATED VICRYL

## (undated) DEVICE — SUTURE SILK 0 FSL

## (undated) DEVICE — DRAIN RELIAVAC 100CC

## (undated) DEVICE — SUTURE PDS II 2-0 SH

## (undated) DEVICE — SPONGE: SPECIALTY PEANUT XR 100/CS: Brand: MEDICAL ACTION INDUSTRIES

## (undated) DEVICE — SCD SLEEVE KNEE HI BLEND

## (undated) DEVICE — UNDYED BRAIDED (POLYGLACTIN 910), SYNTHETIC ABSORBABLE SUTURE: Brand: COATED VICRYL

## (undated) DEVICE — SUTURE VICRYL 3-0 SH

## (undated) DEVICE — ZIPWIRE GUIDEWIRE .038X150 STR

## (undated) DEVICE — PROXIMATE SKIN STAPLERS (35 WIDE) CONTAINS 35 STAINLESS STEEL STAPLES (FIXED HEAD): Brand: PROXIMATE

## (undated) NOTE — IP AVS SNAPSHOT
Patient Demographics     Address  [address] [facility] [address] Phone  [phone] [facility])  [phone] [facility]      Emergency Contact(s)     Name Relation Home Work Jazzy Son   [phone]    Anita Sanches the outside of the drainage bag with water to remove all the bleach solution so that no injury to your skin will develop. • Empty the drainage bag when it is ½ to 1/3 full.   The drainage bag must always have gravity drainage and must be below the level of and urethra that usually do not show up well on X-rays. Tiny surgical instruments can be inserted through the cystoscope that allow your doctor to remove samples of tissue (biopsy) or samples of urine.   Small bladder stones and some small growths can be re antiseptic solution. A local anesthetic jelly will be inserted into the urethra. No needles are used. After the anesthetic takes effect, a well-lubricated cystoscope is inserted into your urethra and slowly advanced into your bladder.  If your urethra ha ACTIVITY  • You should not drive while you are taking prescription pain medication. • You may drive as per your surgeons’ instruction.   • No lifting greater than 10 pounds for 3 weeks  • Avoid strenuous activity such as running or physical workouts for 3 Gladis [place] Dellia Holter, MD. Schedule an appointment as soon as possible for a visit in 1 week.     Specialty: SURGERY, GENERAL  Why: 1 week follow up  Contact information:  Karyna Frey [address]. Preston English MD         oxybutynin 5 MG Tabs  Commonly known as: DITROPAN      Take 1 tablet (5 mg total) by mouth every 12 (twelve) hours as needed (bladder spasms).    Preston English MD         Ozempic (1 MG/DOSE) 2 MG/1.5ML Sopn  G [id number] Midodrine HCl (PROAMATINE) tab 5 mg 07/26/21 1727 Given      [id number] Midodrine HCl (PROAMATINE) tab 5 mg 07/27/21 0628 Given      [id number] Sertraline HCl (ZOLOFT) tab 50 mg 07/27/21 0946 Given      [id number] docusate sodium (COLACE) cap 100 ([facility])    Specimen Information    Type Source Collected On   Blood — 07/27/21 0608          Components    Component Value Reference Range Flag Lab   Glucose 96 70 - 99 mg/dL — [facility])   Sodium 142 136 - 145 mmol/L — [facility] (E [[id number]] Collected: 07/21/21 1357    Order Status: Completed Lab Status: Final result Updated: 07/26/21 0905    Specimen: Abscess from Scrotum      Anaerobic Culture No Anaerobes isolated    Anaerobic Culture [[id number]] Collected: 07/21/21 1323    Lakesha (Abnormal)  (Susceptibility) Collected: 07/13/21 0649    Order Status: Completed Lab Status: Final result Updated: 07/16/21 1319    Specimen: Blood,peripheral      Blood Culture Result Streptococcus anginosus     Blood Culture Smear Gram positive cocci in Sig other providing most of hx. Pt has been having scrotal edema/pain for past few months. Pt also known to have inguinal hernaia. He would manually try to reduce at times. Now having increased pain, decreased mentation.      Has LE wounds and prior fall as Heart:  Regular rate and rhythm, S1, S2 normal, no murmur, rub or gallop appreciated   Abdomen:   Soft, NT/ND, Bowel sounds normal.[CY.1] Scrotal edema/enlargement[CY. 2]   Extremities/MSK: Extremities normal/normal movement, atraumatic, no cyanosis  or ed ABDOMEN+PELVIS(CPT=74176), 1/06/2021, 10:40 AM.  INDICATIONS:  abd pain sepsis scrotal swelling  TECHNIQUE:  Unenhanced multislice CT scanning was performed from the dome of the diaphragm to the pubic symphysis. Dose reduction techniques were used.  Dose i midline fat containing abdominal hernia with moderate subcutaneous edema throughout the lower chest wall, abdomen, upper pelvis.  URINARY BLADDER:  A Newsome catheter is seen within the bladder which has a markedly thickened wall with some mural calcification vascular congestion without acute CHF. No pulmonary edema. No pleural effusion or sign of pneumonia. The cardiac enlargement obscures partially the lower left chest. Consider upright PA and lateral examination of the chest, when tolerated.    Dictated by Certification of Need for Inpatient Hospitalization -[CY. 1] Initial Certification[CY. 2]    Patient will require inpatient services that will reasonably be expected to span two midnight's based on the clinical documentation in H+P.    Based on patients ady Denies N/V/F/C. No other pedal complaints.            History:  Past Medical History:   Diagnosis Date   • Cataract    • Diabetes [facility])    • Essential hypertension    • Fluid retention    • Heart failure (HCC)    • High cholesterol    • Hyperlipidemia     h **OR** dextrose 50 % injection 50 mL, 50 mL, Intravenous, Q15 Min PRN **OR** glucose (DEX4) oral liquid 30 g, 30 g, Oral, Q15 Min PRN **OR** Glucose-Vitamin C (DEX-4) chewable tab 8 tablet, 8 tablet, Oral, Q15 Min PRN  •  Insulin Aspart Pen (NOVOLOG) 100 U  Plantar calcaneal spur.  Diffuse tissue swelling all around the foot and ankle.  No gas collection   seen.  No destructive or lytic lesion seen.  If further imaging of the foot needed, consider MRI follow-up.          Dictated by (CST): Krissy Lutz MD Alex Massey MD (Physician)                                                            General Medicine Discharge Summary     Patient ID:  Angeli Fish  64year old  7/18/1960    Admit date: 7/13/2021    Discharge date and time:7/27/2021 urine cxs NG 24hrs  - afebrile >24h  - WBC improving     # massive right incarcerated inguinal hernia which contains colon, small bowel, and a markedly thick wall bladder  - surgery following --> POD # 4 S/P:  Right scrotal exploration, reduction of small was expressed and wound was flushed with 10ml of normal saline.  Betadine soaked gauze packed into wound and dressed with dry dressing.  - currently on abx per above      # Major Depression/ Generalized anxiety d/o   -reviewed home meds, continue SSRI curr Sacubitril-Valsartan (ENTRESTO) 24-26 MG Oral Tab  Take 1 tablet by mouth 2 (two) times daily. aspirin 81 MG Oral Tab EC  Take 1 tablet (81 mg total) by mouth daily.           I PERSONALLY RECONCILED CURRENT AND DISCHARGE MEDICATIONS ON DAY OF DISCHARGE Pt is a 61 y.o. male admitted 7/13/21 with scrotal edema. Pt presenting with sepsis secondary to UTI as well as abdominal hernia with hydronephrosis.    PMH: DM, HTN, CHF, LE wounds    7/19/21 Cystoscopy, clot evacuation, cystogram, complex Newsome catheter p BALANCE[CY.1]                                                                                                                     Static Sitting: Fair -  Dynamic Sitting: Fair -           Static Standing: Poor +  Dynamic Standing: Poor[CY. 2]    ACTIVITY TO elevated. Waffle cushion placed on chair for comfort. PCT and RN aware. Chair alarm on. Wide RW provided in room for pt use. [CY.3]       Patient End of Session: Up in chair;Needs met;Call light within reach;RN aware of session/findings; All patient questi 7/26/2021 11:47 AM  Version 1 of 1    Author: Kusum Mae Service: Rehab Author Type: OT Student    Filed: 7/26/2021 11:47 AM Date of Service: 7/26/2021  9:30 AM Status: Signed    : Kusum Mae (OT Student) Cosigner: JESSICA Fink SUBJECTIVE  \" I can't put on my socks because of my vision\"    Patient self-stated goal is to go home    OBJECTIVE[AC.1]  Precautions: Bed/chair alarm; Low vision (severely enlarged scrotum)[AC.2]    WEIGHT BEARING RESTRICTION[AC.1]  Weight Bearing Restri Chair: min a    Functional Mobility: min a with     Balance  Static Sitting: mod I  Dynamic Sitting: mod I for reaching  Static Standing: cga  Dynamic Standing: cga for reaching    Therex  Shoulder flex  Elbow flex/ext  Scap squeezes    ADL  LB Dress:   Tot independent with bilateral AROM HEP (home exercise program).     Additional Goals:  Patient will participate in cognitive screen[AC.1]     Attribution Key    AC. 1 - Javier Bailey on 7/26/2021 11:29 AM  AC. 2 - Javier Bailey on 7/26/2021 11

## (undated) NOTE — LETTER
Charisse Kumar [address]  [address] [facility] [address], [address] [facility]  Authorization for Surgical Operation and Procedure     Date:__July 19, 2021_________                                                                                                         Time but not all, of the potential risks that can occur: fever and allergic reactions, hemolytic reactions, transmission of diseases such as Hepatitis, AIDS and Cytomegalovirus (CMV) and fluid overload.   In the event that I wish to have an autologous transfusio attending physician will determine when the applicable recovery period ends for purposes of reinstating the DNAR order.   10. Patients having a sterilization procedure: I understand that if the procedure is successful the results will be permanent and it wi the anesthesiologist (anesthesia doctor) to give me medicine and do additional procedures as necessary.  Some examples are: Starting or using an “IV” to give me medicine, fluids or blood during my procedure, and having a breathing tube placed to help me [place]ville “epidural”, & “nerve blocks”): I understand that rare but potential complications include headache, bleeding, infection, seizure, irregular heart rhythms, and nerve injury.     I can change my mind about having anesthesia services at any time before I get

## (undated) NOTE — LETTER
[address] [facility] FOR BLOOD OR BLOOD COMPONENTS      In the course of your treatment, it may become necessary to administer a transfusion of blood or blood components.  This form provides basic information concerning this proc alternatives to you if it has not already been done. I,Joce Swanson, have read/had read to me the above. I understand the matters bearing on the decision whether or not to authorize a transfusion of blood or blood components.  I have no questions whi

## (undated) NOTE — LETTER
[address] [facility] FOR BLOOD OR BLOOD COMPONENTS      In the course of your treatment, it may become necessary to administer a transfusion of blood or blood components.  This form provides basic information concerning this proc alternatives to you if it has not already been done. I,Joce Swanson, have read/had read to me the above. I understand the matters bearing on the decision whether or not to authorize a transfusion of blood or blood components.  I have no questions whi

## (undated) NOTE — LETTER
Charisse Kumar [address]  [address] [facility] [address], [address] [facility]  Authorization for Surgical Operation and Procedure     Date:___________                                                                                                         Time:__________ of the potential risks that can occur: fever and allergic reactions, hemolytic reactions, transmission of diseases such as Hepatitis, AIDS and Cytomegalovirus (CMV) and fluid overload.   In the event that I wish to have an autologous transfusion of my own b physician will determine when the applicable recovery period ends for purposes of reinstating the DNAR order.   10. Patients having a sterilization procedure: I understand that if the procedure is successful the results will be permanent and it will therefo anesthesiologist (anesthesia doctor) to give me medicine and do additional procedures as necessary.  Some examples are: Starting or using an “IV” to give me medicine, fluids or blood during my procedure, and having a breathing tube placed to help me breathe “epidural”, & “nerve blocks”): I understand that rare but potential complications include headache, bleeding, infection, seizure, irregular heart rhythms, and nerve injury.     I can change my mind about having anesthesia services at any time before I get

## (undated) NOTE — IP AVS SNAPSHOT
[address]            (For Outpatient Use Only) Initial Admit Date: 7/13/2021   Inpt/Obs Admit Date: Inpt: 7/13/21 / Obs: N/A   Discharge Date:    Sean Su:  [de-identified]   MRN: [de-identified]   CSN: [id number]   CEID: [id number] Subscriber Name:  Miguelina Luke :    Subscriber ID:  Pt Rel to Subscriber:    Hospital Account Financial Class: Medicare Advantage    2021

## (undated) NOTE — LETTER
Charisse Kumar [address]  [address] [facility] [address], [address] [facility]  Authorization for Surgical Operation and Procedure     Date:___________                                                                                                         Time:__________ risks that can occur: fever and allergic reactions, hemolytic reactions, transmission of diseases such as Hepatitis, AIDS and Cytomegalovirus (CMV) and fluid overload.   In the event that I wish to have an autologous transfusion of my own blood, or a direct determine when the applicable recovery period ends for purposes of reinstating the DNAR order.   10. Patients having a sterilization procedure: I understand that if the procedure is successful the results will be permanent and it will therefore be impossibl (anesthesia doctor) to give me medicine and do additional procedures as necessary.  Some examples are: Starting or using an “IV” to give me medicine, fluids or blood during my procedure, and having a breathing tube placed to help me breathe when I’m asleep blocks”): I understand that rare but potential complications include headache, bleeding, infection, seizure, irregular heart rhythms, and nerve injury.     I can change my mind about having anesthesia services at any time before I get the medicine.    ____

## (undated) NOTE — LETTER
Charisse Kumar [address]  [address] [facility] [address], [address] [facility]  Authorization for Surgical Operation and Procedure     Date:___________                                                                                                         Time:__________ but not all, of the potential risks that can occur: fever and allergic reactions, hemolytic reactions, transmission of diseases such as Hepatitis, AIDS and Cytomegalovirus (CMV) and fluid overload.   In the event that I wish to have an autologous transfusio attending physician will determine when the applicable recovery period ends for purposes of reinstating the DNAR order.   10. Patients having a sterilization procedure: I understand that if the procedure is successful the results will be permanent and it wi the anesthesiologist (anesthesia doctor) to give me medicine and do additional procedures as necessary.  Some examples are: Starting or using an “IV” to give me medicine, fluids or blood during my procedure, and having a breathing tube placed to help me orville “epidural”, & “nerve blocks”): I understand that rare but potential complications include headache, bleeding, infection, seizure, irregular heart rhythms, and nerve injury.     I can change my mind about having anesthesia services at any time before I get